# Patient Record
Sex: FEMALE | Race: WHITE | NOT HISPANIC OR LATINO | Employment: OTHER | URBAN - METROPOLITAN AREA
[De-identification: names, ages, dates, MRNs, and addresses within clinical notes are randomized per-mention and may not be internally consistent; named-entity substitution may affect disease eponyms.]

---

## 2018-11-06 ENCOUNTER — OFFICE VISIT (OUTPATIENT)
Dept: INTERNAL MEDICINE CLINIC | Facility: CLINIC | Age: 53
End: 2018-11-06
Payer: COMMERCIAL

## 2018-11-06 VITALS
HEART RATE: 87 BPM | SYSTOLIC BLOOD PRESSURE: 143 MMHG | HEIGHT: 68 IN | BODY MASS INDEX: 35.49 KG/M2 | OXYGEN SATURATION: 97 % | DIASTOLIC BLOOD PRESSURE: 81 MMHG | TEMPERATURE: 98.2 F | WEIGHT: 234.2 LBS

## 2018-11-06 DIAGNOSIS — E66.01 CLASS 2 SEVERE OBESITY DUE TO EXCESS CALORIES WITH SERIOUS COMORBIDITY AND BODY MASS INDEX (BMI) OF 35.0 TO 35.9 IN ADULT (HCC): ICD-10-CM

## 2018-11-06 DIAGNOSIS — Z01.818 PREOPERATIVE CLEARANCE: Primary | ICD-10-CM

## 2018-11-06 DIAGNOSIS — H25.9 AGE-RELATED CATARACT OF BOTH EYES, UNSPECIFIED AGE-RELATED CATARACT TYPE: ICD-10-CM

## 2018-11-06 DIAGNOSIS — E11.65 TYPE 2 DIABETES MELLITUS WITH HYPERGLYCEMIA, WITHOUT LONG-TERM CURRENT USE OF INSULIN (HCC): ICD-10-CM

## 2018-11-06 PROCEDURE — 99242 OFF/OP CONSLTJ NEW/EST SF 20: CPT | Performed by: NURSE PRACTITIONER

## 2018-11-06 RX ORDER — BLOOD SUGAR DIAGNOSTIC
STRIP MISCELLANEOUS
Qty: 180 EACH | Refills: 0 | Status: SHIPPED | OUTPATIENT
Start: 2018-11-06 | End: 2019-10-22

## 2018-11-06 RX ORDER — PNV NO.95/FERROUS FUM/FOLIC AC 28MG-0.8MG
1 TABLET ORAL 2 TIMES DAILY
COMMUNITY
End: 2020-04-10 | Stop reason: ALTCHOICE

## 2018-11-06 RX ORDER — LANOLIN ALCOHOL/MO/W.PET/CERES
1 CREAM (GRAM) TOPICAL DAILY
COMMUNITY
End: 2020-04-10 | Stop reason: ALTCHOICE

## 2018-11-06 RX ORDER — BLOOD SUGAR DIAGNOSTIC
STRIP MISCELLANEOUS
Qty: 180 EACH | Refills: 2 | Status: SHIPPED | OUTPATIENT
Start: 2018-11-06 | End: 2018-11-06 | Stop reason: SDUPTHER

## 2018-11-06 RX ORDER — BLOOD SUGAR DIAGNOSTIC
STRIP MISCELLANEOUS
COMMUNITY
Start: 2018-10-19 | End: 2018-11-06 | Stop reason: SDUPTHER

## 2018-11-06 RX ORDER — KETOROLAC TROMETHAMINE 5 MG/ML
SOLUTION OPHTHALMIC
COMMUNITY
Start: 2018-11-06 | End: 2019-10-22

## 2018-11-06 RX ORDER — MULTIVIT WITH MINERALS/LUTEIN
1000 TABLET ORAL DAILY
COMMUNITY
End: 2019-10-22

## 2018-11-06 RX ORDER — LANCETS 33 GAUGE
EACH MISCELLANEOUS
COMMUNITY
Start: 2018-10-19 | End: 2019-10-22

## 2018-11-06 RX ORDER — ACETAMINOPHEN 160 MG
1 TABLET,DISINTEGRATING ORAL DAILY
COMMUNITY
End: 2020-04-10 | Stop reason: ALTCHOICE

## 2018-11-06 NOTE — PROGRESS NOTES
Assessment/Plan:     Diagnoses and all orders for this visit:    Preoperative clearance    Type 2 diabetes mellitus with hyperglycemia, without long-term current use of insulin (HCC)  -     Discontinue: ONETOUCH VERIO test strip; Twice a day  -     HEMOGLOBIN A1C W/ EAG ESTIMATION; Future  -     Comprehensive metabolic panel; Future  -     CBC and differential; Future  -     Lipid panel; Future  -     ONETOUCH VERIO test strip; Twice a day    Class 2 severe obesity due to excess calories with serious comorbidity and body mass index (BMI) of 35 0 to 35 9 in adult (HCC)  -     HEMOGLOBIN A1C W/ EAG ESTIMATION; Future  -     Comprehensive metabolic panel; Future  -     CBC and differential; Future  -     Lipid panel; Future    Age-related cataract of both eyes, unspecified age-related cataract type    Other orders  -     B Complex Vitamins (B COMPLEX 1 PO); Take 1 capsule by mouth daily  -     Discontinue: ONETOUCH VERIO test strip;   -     Ferrous Sulfate (IRON) 325 (65 Fe) MG TABS; Take 1 tablet by mouth 2 (two) times a day  -     ketorolac (ACULAR) 0 5 % ophthalmic solution;   -     ONETOUCH DELICA LANCETS 73H MISC;   -     cyanocobalamin (VITAMIN B-12) 1,000 mcg tablet; Take 1 tablet by mouth daily  -     Cholecalciferol (VITAMIN D3) 2000 units capsule; Take 1 capsule by mouth daily  -     vitamin E, tocopherol, 1,000 units capsule; Take 1,000 Units by mouth daily          Subjective:      Patient ID: Deanne Vinson is a 47 y o  female  Patient is here for cataract sx clearance with dr Jerry medley        The following portions of the patient's history were reviewed and updated as appropriate: allergies, current medications, past family history, past medical history, past social history, past surgical history and problem list     Review of Systems   Constitutional: Negative  HENT: Negative  Eyes: Negative  Respiratory: Negative  Cardiovascular: Negative  Gastrointestinal: Negative  Musculoskeletal: Negative  Neurological: Negative  No family history on file  Past Medical History:   Diagnosis Date    Non-toxic multinodular goiter      Social History     Social History    Marital status: /Civil Union     Spouse name: N/A    Number of children: N/A    Years of education: N/A     Occupational History    Not on file  Social History Main Topics    Smoking status: Current Some Day Smoker    Smokeless tobacco: Not on file    Alcohol use Yes      Comment: social     Drug use: Unknown    Sexual activity: Not on file     Other Topics Concern    Not on file     Social History Narrative    Exercising regularly                Current Outpatient Prescriptions:     B Complex Vitamins (B COMPLEX 1 PO), Take 1 capsule by mouth daily, Disp: , Rfl:     Cholecalciferol (VITAMIN D3) 2000 units capsule, Take 1 capsule by mouth daily, Disp: , Rfl:     cyanocobalamin (VITAMIN B-12) 1,000 mcg tablet, Take 1 tablet by mouth daily, Disp: , Rfl:     Ferrous Sulfate (IRON) 325 (65 Fe) MG TABS, Take 1 tablet by mouth 2 (two) times a day, Disp: , Rfl:     ketorolac (ACULAR) 0 5 % ophthalmic solution, , Disp: , Rfl:     ONETOUCH DELICA LANCETS 07F MISC, , Disp: , Rfl:     ONETOUCH VERIO test strip, Twice a day, Disp: 180 each, Rfl: 0    vitamin E, tocopherol, 1,000 units capsule, Take 1,000 Units by mouth daily, Disp: , Rfl:   Allergies   Allergen Reactions    Other Throat Swelling         Objective:      /81   Pulse 87   Temp 98 2 °F (36 8 °C)   Ht 5' 8" (1 727 m)   Wt 106 kg (234 lb 3 2 oz)   SpO2 97%   BMI 35 61 kg/m²          Physical Exam   Constitutional: She is oriented to person, place, and time  She appears well-developed and well-nourished  HENT:   Head: Normocephalic and atraumatic  Eyes: Pupils are equal, round, and reactive to light  Conjunctivae are normal    Neck: Normal range of motion  Neck supple  Cardiovascular: Normal rate and regular rhythm  Pulmonary/Chest: Effort normal and breath sounds normal    Abdominal: Soft  Bowel sounds are normal    Musculoskeletal: Normal range of motion  Neurological: She is alert and oriented to person, place, and time  Skin: Skin is warm and dry

## 2018-12-04 LAB
LEFT EYE DIABETIC RETINOPATHY: NORMAL
RIGHT EYE DIABETIC RETINOPATHY: NORMAL
SEVERITY (EYE EXAM): NORMAL

## 2019-06-13 DIAGNOSIS — Z12.39 SCREENING FOR MALIGNANT NEOPLASM OF BREAST: Primary | ICD-10-CM

## 2019-08-21 ENCOUNTER — OFFICE VISIT (OUTPATIENT)
Dept: INTERNAL MEDICINE CLINIC | Facility: CLINIC | Age: 54
End: 2019-08-21
Payer: COMMERCIAL

## 2019-08-21 VITALS
OXYGEN SATURATION: 99 % | RESPIRATION RATE: 16 BRPM | SYSTOLIC BLOOD PRESSURE: 146 MMHG | BODY MASS INDEX: 37.5 KG/M2 | HEART RATE: 86 BPM | WEIGHT: 247.4 LBS | TEMPERATURE: 98.1 F | DIASTOLIC BLOOD PRESSURE: 78 MMHG | HEIGHT: 68 IN

## 2019-08-21 DIAGNOSIS — M54.41 CHRONIC BILATERAL LOW BACK PAIN WITH BILATERAL SCIATICA: ICD-10-CM

## 2019-08-21 DIAGNOSIS — E11.65 TYPE 2 DIABETES MELLITUS WITH HYPERGLYCEMIA, WITHOUT LONG-TERM CURRENT USE OF INSULIN (HCC): Primary | ICD-10-CM

## 2019-08-21 DIAGNOSIS — Z72.89 ALCOHOL USE: ICD-10-CM

## 2019-08-21 DIAGNOSIS — Z00.00 ANNUAL PHYSICAL EXAM: ICD-10-CM

## 2019-08-21 DIAGNOSIS — M54.42 CHRONIC BILATERAL LOW BACK PAIN WITH BILATERAL SCIATICA: ICD-10-CM

## 2019-08-21 DIAGNOSIS — G89.29 CHRONIC BILATERAL LOW BACK PAIN WITH BILATERAL SCIATICA: ICD-10-CM

## 2019-08-21 PROBLEM — F10.10 ALCOHOL ABUSE: Status: ACTIVE | Noted: 2019-08-21

## 2019-08-21 LAB — SL AMB POCT HEMOGLOBIN AIC: 8.5 (ref ?–6.5)

## 2019-08-21 PROCEDURE — 99396 PREV VISIT EST AGE 40-64: CPT | Performed by: NURSE PRACTITIONER

## 2019-08-21 PROCEDURE — 3045F PR MOST RECENT HEMOGLOBIN A1C LEVEL 7.0-9.0%: CPT | Performed by: NURSE PRACTITIONER

## 2019-08-21 PROCEDURE — 99214 OFFICE O/P EST MOD 30 MIN: CPT | Performed by: NURSE PRACTITIONER

## 2019-08-21 PROCEDURE — 83036 HEMOGLOBIN GLYCOSYLATED A1C: CPT | Performed by: NURSE PRACTITIONER

## 2019-08-21 RX ORDER — GABAPENTIN 100 MG/1
100 CAPSULE ORAL
Qty: 30 CAPSULE | Refills: 2 | Status: SHIPPED | OUTPATIENT
Start: 2019-08-21 | End: 2019-10-22

## 2019-08-21 NOTE — PROGRESS NOTES
ADULT ANNUAL PHYSICAL  [facility] - [facility]    NAME: Ryder Jacobson  AGE: 49 y o  SEX: female  : 1965     DATE: 2019     Assessment and Plan:     Problem List Items Addressed This Visit        Endocrine    Type 2 diabetes mellitus with hyperglycemia, without long-term current use of insulin ([facility] [address] ) - Primary     Lab Results   Component Value Date    HGBA1C 8 5 (A) 2019       No results for input(s): POCGLU in the last 72 hours  Blood Sugar Average: Last 72 hrs:       Refused oral meds had diarrhea on metformin didn't want to try anything else    Educated on eating healthy and losing weight  Will try victoza         Relevant Medications    liraglutide (VICTOZA) injection    Other Relevant Orders    POCT hemoglobin A1c (Completed)    HEMOGLOBIN A1C W/ EAG ESTIMATION    Comprehensive metabolic panel    CBC and differential       Nervous and Auditory    Chronic bilateral low back pain with bilateral sciatica     Patient refused pain management due to cost  Continue doing lower back exercises  Start gabapentin at night for sleep         Relevant Medications    gabapentin (NEURONTIN) 100 mg capsule       Other    Alcohol abuse     Patient drink every night   Partly to mask pain and to fall asleep  Advised that this is not healthy  Will try gabapentin hoping that this will decrease her drining           Other Visit Diagnoses     Annual physical exam              Immunizations and preventive care screenings were discussed with patient today  Appropriate education was printed on patient's after visit summary  Counseling:    Return in about 3 months (around 2019)  Chief Complaint:     Chief Complaint   Patient presents with    Follow-up     forms filled for work      History of Present Illness:     Adult Annual Physical   Patient here for a comprehensive physical exam  The patient reports problems - back pain and insomnia      Diet and Physical Activity  · Diet/Nutrition: poor diet  · Exercise: no formal exercise  Depression Screening  PHQ-9 Depression Screening    PHQ-9:    Frequency of the following problems over the past two weeks:            General Health  · Sleep: gets 4-6 hours of sleep on average  · Hearing: normal - bilateral   · Vision: no vision problems  · Dental: regular dental visits  ·    Review of Systems:     Review of Systems   Constitutional: Negative  HENT: Negative  Eyes: Negative  Respiratory: Negative  Cardiovascular: Negative  Gastrointestinal: Negative  Musculoskeletal: Positive for back pain  Neurological: Negative         Past Medical History:     Past Medical History:   Diagnosis Date    Non-toxic multinodular goiter       Past Surgical History:     Past Surgical History:   Procedure Laterality Date    STOMACH SURGERY      for morbid obesity / last assessed 9/10/12    US GUIDED THYROID BIOPSY      biopsy thyroid using percutaneous core needle       Social History:     Social History     Socioeconomic History    Marital status: /Civil Union     Spouse name: None    Number of children: None    Years of education: None    Highest education level: None   Occupational History    None   Social Needs    Financial resource strain: None    Food insecurity:     Worry: None     Inability: None    Transportation needs:     Medical: None     Non-medical: None   Tobacco Use    Smoking status: Former Smoker    Smokeless tobacco: Never Used   Substance and Sexual Activity    Alcohol use: Yes     Frequency: 4 or more times a week     Drinks per session: 3 or 4     Comment: social     Drug use: Never    Sexual activity: None   Lifestyle    Physical activity:     Days per week: None     Minutes per session: None    Stress: None   Relationships    Social connections:     Talks on phone: None     Gets together: None     Attends Restoration service: None     Active member of club or organization: None     Attends meetings of clubs or organizations: None     Relationship status: None    Intimate partner violence:     Fear of current or ex partner: None     Emotionally abused: None     Physically abused: None     Forced sexual activity: None   Other Topics Concern    None   Social History Narrative    Exercising regularly           Family History:     No family history on file  Current Medications:     Current Outpatient Medications   Medication Sig Dispense Refill    B Complex Vitamins (B COMPLEX 1 PO) Take 1 capsule by mouth daily      Cholecalciferol (VITAMIN D3) 2000 units capsule Take 1 capsule by mouth daily      cyanocobalamin (VITAMIN B-12) 1,000 mcg tablet Take 1 tablet by mouth daily      ketorolac (ACULAR) 0 5 % ophthalmic solution       Multiple Vitamins-Minerals (PRESERVISION AREDS PO) Take 1 capsule by mouth      ONETOUCH DELICA LANCETS 51G MISC       ONETOUCH VERIO test strip Twice a day 180 each 0    vitamin E, tocopherol, 1,000 units capsule Take 1,000 Units by mouth daily      Ferrous Sulfate (IRON) 325 (65 Fe) MG TABS Take 1 tablet by mouth 2 (two) times a day      gabapentin (NEURONTIN) 100 mg capsule Take 1 capsule (100 mg total) by mouth daily at bedtime 30 capsule 2    liraglutide (VICTOZA) injection Inject 0 1 mL (0 6 mg total) under the skin daily for 30 days 1 pen 2     No current facility-administered medications for this visit  Allergies: Allergies   Allergen Reactions    Other Throat Swelling      Physical Exam:     /78 (BP Location: Left arm, Patient Position: Sitting, Cuff Size: Large)   Pulse 86   Temp 98 1 °F (36 7 °C) (Oral)   Resp 16   Ht 5' 7 5" (1 715 m)   Wt 112 kg (247 lb 6 4 oz)   SpO2 99%   BMI 38 18 kg/m²     Physical Exam   Constitutional: She is oriented to person, place, and time  She appears well-developed and well-nourished  HENT:   Head: Normocephalic and atraumatic     Right Ear: External ear normal    Left Ear: External ear normal    Nose: Nose normal    Mouth/Throat: Oropharynx is clear and moist    Eyes: Pupils are equal, round, and reactive to light  Conjunctivae are normal    Neck: Normal range of motion  Neck supple  Cardiovascular: Normal rate and regular rhythm  Pulmonary/Chest: Effort normal and breath sounds normal    Abdominal: Soft  Bowel sounds are normal    Musculoskeletal:        Lumbar back: She exhibits decreased range of motion and pain  Neurological: She is alert and oriented to person, place, and time  Skin: Skin is warm and dry  Nursing note and vitals reviewed        IZABELA Cameron  [facility]

## 2019-08-21 NOTE — ASSESSMENT & PLAN NOTE
Patient drink every night   Partly to mask pain and to fall asleep  Advised that this is not healthy  Will try gabapentin hoping that this will decrease her drining

## 2019-08-21 NOTE — ASSESSMENT & PLAN NOTE
Lab Results   Component Value Date    HGBA1C 8 5 (A) 08/21/2019       No results for input(s): POCGLU in the last 72 hours      Blood Sugar Average: Last 72 hrs:       Refused oral meds had diarrhea on metformin didn't want to try anything else    Educated on eating healthy and losing weight  Will try victoza

## 2019-08-21 NOTE — PROGRESS NOTES
Assessment/Plan:    Type 2 diabetes mellitus with hyperglycemia, without long-term current use of insulin ([facility])  Lab Results   Component Value Date    HGBA1C 8 5 (A) 08/21/2019       No results for input(s): POCGLU in the last 72 hours  Blood Sugar Average: Last 72 hrs:       Refused oral meds had diarrhea on metformin didn't want to try anything else    Educated on eating healthy and losing weight  Will try victoza    Chronic bilateral low back pain with bilateral sciatica  Patient refused pain management due to cost  Continue doing lower back exercises  Start gabapentin at night for sleep    Alcohol abuse  Patient drink every night   Partly to mask pain and to fall asleep  Advised that this is not healthy  Will try gabapentin hoping that this will decrease her drining       Diagnoses and all orders for this visit:    Type 2 diabetes mellitus with hyperglycemia, without long-term current use of insulin ([facility])  -     POCT hemoglobin A1c  -     liraglutide (VICTOZA) injection; Inject 0 1 mL (0 6 mg total) under the skin daily for 30 days  -     HEMOGLOBIN A1C W/ EAG ESTIMATION; Future  -     Comprehensive metabolic panel; Future  -     CBC and differential; Future    Chronic bilateral low back pain with bilateral sciatica  -     gabapentin (NEURONTIN) 100 mg capsule; Take 1 capsule (100 mg total) by mouth daily at bedtime    Alcohol abuse    Annual physical exam    Other orders  -     Cancel: TDAP VACCINE GREATER THAN OR EQUAL TO 6YO IM  -     Cancel: Ambulatory referral to Gastroenterology; Future  -     Cancel: Ambulatory referral to Gynecology; Future  -     Multiple Vitamins-Minerals (PRESERVISION AREDS PO); Take 1 capsule by mouth          Subjective:      Patient ID: Erna Goodson is a 49 y o  female  Lower back pain for years with radiation to legs  Sees chiropractor but can no longer afford to see him  Doing at home PT  Works as a CNA      Back Pain   This is a new problem   The current episode started in the past 7 days  The problem occurs constantly  The problem is unchanged  The pain is present in the lumbar spine  The quality of the pain is described as burning and aching  The pain is severe  The symptoms are aggravated by bending and twisting  The following portions of the patient's history were reviewed and updated as appropriate: allergies, current medications, past family history, past medical history, past social history, past surgical history and problem list     Review of Systems   Constitutional: Negative  HENT: Negative  Eyes: Negative  Respiratory: Negative  Cardiovascular: Negative  Gastrointestinal: Negative  Musculoskeletal: Positive for back pain  Neurological: Negative  Objective:      /78 (BP Location: Left arm, Patient Position: Sitting, Cuff Size: Large)   Pulse 86   Temp 98 1 °F (36 7 °C) (Oral)   Resp 16   Ht 5' 7 5" (1 715 m)   Wt 112 kg (247 lb 6 4 oz)   SpO2 99%   BMI 38 18 kg/m²           Physical Exam   Constitutional: She is oriented to person, place, and time  She appears well-developed and well-nourished  HENT:   Head: Normocephalic and atraumatic  Right Ear: External ear normal    Left Ear: External ear normal    Nose: Nose normal    Mouth/Throat: Oropharynx is clear and moist    Eyes: Pupils are equal, round, and reactive to light  Conjunctivae are normal    Neck: Normal range of motion  Neck supple  Cardiovascular: Normal rate and regular rhythm  Pulmonary/Chest: Effort normal and breath sounds normal    Abdominal: Soft  Bowel sounds are normal    Musculoskeletal:        Lumbar back: She exhibits decreased range of motion and pain  Neurological: She is alert and oriented to person, place, and time  Skin: Skin is warm and dry  Nursing note and vitals reviewed

## 2019-08-21 NOTE — PATIENT INSTRUCTIONS

## 2019-08-21 NOTE — ASSESSMENT & PLAN NOTE
Patient refused pain management due to cost  Continue doing lower back exercises  Start gabapentin at night for sleep

## 2019-09-11 ENCOUNTER — TELEPHONE (OUTPATIENT)
Dept: INTERNAL MEDICINE CLINIC | Facility: CLINIC | Age: 54
End: 2019-09-11

## 2019-09-11 DIAGNOSIS — M54.41 CHRONIC BILATERAL LOW BACK PAIN WITH BILATERAL SCIATICA: Primary | ICD-10-CM

## 2019-09-11 DIAGNOSIS — M54.42 CHRONIC BILATERAL LOW BACK PAIN WITH BILATERAL SCIATICA: Primary | ICD-10-CM

## 2019-09-11 DIAGNOSIS — G89.29 CHRONIC BILATERAL LOW BACK PAIN WITH BILATERAL SCIATICA: Primary | ICD-10-CM

## 2019-09-11 RX ORDER — METHYLPREDNISOLONE 4 MG/1
TABLET ORAL
Qty: 21 EACH | Refills: 0 | Status: SHIPPED | OUTPATIENT
Start: 2019-09-11 | End: 2019-10-22

## 2019-09-11 NOTE — TELEPHONE ENCOUNTER
Pt was in to see you a few weeks ago for back pain  She said you gave her Gabapentin 100 MG and it's not helping her pain at all  She wants to know how to proceed  She also said since she cannot work due to her pain and not being able to sit or stand for long periods of time, she might go on disability and wants to know if you would be able to help her with those forms  Please advise

## 2019-09-11 NOTE — TELEPHONE ENCOUNTER
She should see pain management for this and they can help her with disability  For the pain I can send her prednisone if she wants to try that

## 2019-10-22 ENCOUNTER — HOSPITAL ENCOUNTER (OUTPATIENT)
Dept: RADIOLOGY | Facility: HOSPITAL | Age: 54
Discharge: HOME | End: 2019-10-22
Attending: ANESTHESIOLOGY
Payer: COMMERCIAL

## 2019-10-22 ENCOUNTER — CONSULT (OUTPATIENT)
Dept: PAIN MEDICINE | Facility: CLINIC | Age: 54
End: 2019-10-22
Payer: COMMERCIAL

## 2019-10-22 VITALS
DIASTOLIC BLOOD PRESSURE: 87 MMHG | SYSTOLIC BLOOD PRESSURE: 177 MMHG | TEMPERATURE: 98.5 F | BODY MASS INDEX: 38.58 KG/M2 | HEART RATE: 85 BPM | WEIGHT: 250 LBS

## 2019-10-22 DIAGNOSIS — M43.16 SPONDYLOLISTHESIS AT L3-L4 LEVEL: ICD-10-CM

## 2019-10-22 DIAGNOSIS — M46.1 SACROILIITIS (HCC): ICD-10-CM

## 2019-10-22 DIAGNOSIS — M51.16 INTERVERTEBRAL DISC DISORDER WITH RADICULOPATHY OF LUMBAR REGION: Primary | ICD-10-CM

## 2019-10-22 DIAGNOSIS — M25.552 BILATERAL HIP PAIN: ICD-10-CM

## 2019-10-22 DIAGNOSIS — M25.551 BILATERAL HIP PAIN: ICD-10-CM

## 2019-10-22 PROCEDURE — 73522 X-RAY EXAM HIPS BI 3-4 VIEWS: CPT

## 2019-10-22 PROCEDURE — 99244 OFF/OP CNSLTJ NEW/EST MOD 40: CPT | Performed by: ANESTHESIOLOGY

## 2019-10-22 RX ORDER — GABAPENTIN 300 MG/1
CAPSULE ORAL
Qty: 90 CAPSULE | Refills: 0 | Status: SHIPPED | OUTPATIENT
Start: 2019-10-22 | End: 2019-12-11 | Stop reason: SDUPTHER

## 2019-10-22 NOTE — PROGRESS NOTES
Assessment  1  Intervertebral disc disorder with radiculopathy of lumbar region    2  Bilateral hip pain    3  Spondylolisthesis at L3-L4 level    4  Sacroiliitis ([address] )        Plan  The patient's symptoms, history/physical are consistent with pain that is multifactorial in origin but predominantly the result of her anterolisthesis at L3-4 with disc protrusion and foraminal stenosis as well as likely underlying sacroiliitis and hip pathology contributing to the symptoms  At this time, I will order x-rays of the hip and pelvis to evaluate for hip degeneration  I advised we will call with the results and discuss treatment moving forward which will include either performing epidural steroid injection versus corticosteroid injections into the sacroiliac joints versus hips  For now, I will have her increase the gabapentin to 300 mg q h s  with instructions to titrate up to t i d  dosing  I advised her that she has not gained significant weight since she started this medication even though she feels she has gained 10 lb  In addition, patient did admit to drinking a case of beer a week which she is trying to cut back on  [place] Prescription Drug Monitoring Program report was reviewed and was appropriate     My impressions and treatment recommendations were discussed in detail with the patient who verbalized understanding and had no further questions  Discharge instructions were provided  I personally saw and examined the patient and I agree with the above discussed plan of care  Orders Placed This Encounter   Procedures    XR hip/pelv 2-3 vws right if performed     Standing Status:   Future     Standing Expiration Date:   10/22/2023     Scheduling Instructions:      Bring along any outside films relating to this procedure  Order Specific Question:   Is the patient pregnant?      Answer:   No     New Medications Ordered This Visit   Medications    gabapentin (NEURONTIN) 300 mg capsule Sig: Take 1 tablet at bedtime for 3 days, then 1 tablet twice daily for 3 days, then 1 tablet 3 times daily     Dispense:  90 capsule     Refill:  0       History of Present Illness    Tawana Mahmood is a 49 y o  female who presents for consultation in regards to lower back and bilateral lower extremity pain  Symptoms have been present for 4 years without any precipitating injury or trauma  Pain is moderate to severe rated 7/10 on numeric rating scale and felt nearly constantly  Symptoms described to be throbbing, sharp, shooting, cramping and radiates into both legs with numbness  She feels weakness the legs  She uses a cane for ambulation and sometimes a walker  Symptoms are aggravated with lying down, standing, bending, sitting, walking, exercise, relaxation  There is no change with coughing, sneezing or bowel movements  Treatment history has included use of Aleve or Tylenol which provides minimal relief  She has not done any physical therapy  I have personally reviewed and/or updated the patient's past medical history, past surgical history, family history, social history, current medications, allergies, and vital signs today  Review of Systems   Constitutional: Positive for unexpected weight change  Negative for fever  HENT: Negative for trouble swallowing  Eyes: Negative for visual disturbance  Respiratory: Negative for shortness of breath and wheezing  Cardiovascular: Positive for leg swelling  Negative for chest pain and palpitations  Gastrointestinal: Negative for constipation, diarrhea, nausea and vomiting  Endocrine: Negative for cold intolerance, heat intolerance and polydipsia  Genitourinary: Negative for difficulty urinating and frequency  Musculoskeletal: Negative for arthralgias, gait problem, joint swelling and myalgias  Skin: Negative for rash  Neurological: Positive for numbness  Negative for dizziness, seizures, syncope, weakness and headaches  Hematological: Does not bruise/bleed easily  Psychiatric/Behavioral: Negative for dysphoric mood  All other systems reviewed and are negative  Patient Active Problem List   Diagnosis    Type 2 diabetes mellitus with hyperglycemia, without long-term current use of insulin ([facility] [address] )    Spondylolisthesis at L3-L4 level    Alcohol abuse       Past Medical History:   Diagnosis Date    Non-toxic multinodular goiter        Past Surgical History:   Procedure Laterality Date    STOMACH SURGERY      for morbid obesity / last assessed 9/10/12    US GUIDED THYROID BIOPSY      biopsy thyroid using percutaneous core needle        No family history on file      Social History     Occupational History    Not on file   Tobacco Use    Smoking status: Former Smoker    Smokeless tobacco: Never Used   Substance and Sexual Activity    Alcohol use: Yes     Frequency: 4 or more times a week     Drinks per session: 3 or 4     Comment: social     Drug use: Never    Sexual activity: Not on file       Current Outpatient Medications on File Prior to Visit   Medication Sig    B Complex Vitamins (B COMPLEX 1 PO) Take 1 capsule by mouth daily    Cholecalciferol (VITAMIN D3) 2000 units capsule Take 1 capsule by mouth daily    cyanocobalamin (VITAMIN B-12) 1,000 mcg tablet Take 1 tablet by mouth daily    Ferrous Sulfate (IRON) 325 (65 Fe) MG TABS Take 1 tablet by mouth 2 (two) times a day    Multiple Vitamins-Minerals (PRESERVISION AREDS PO) Take 1 capsule by mouth    [DISCONTINUED] gabapentin (NEURONTIN) 100 mg capsule Take 1 capsule (100 mg total) by mouth daily at bedtime    [DISCONTINUED] vitamin E, tocopherol, 1,000 units capsule Take 1,000 Units by mouth daily    [DISCONTINUED] ketorolac (ACULAR) 0 5 % ophthalmic solution     [DISCONTINUED] liraglutide (VICTOZA) injection Inject 0 1 mL (0 6 mg total) under the skin daily for 30 days    [DISCONTINUED] methylPREDNISolone 4 MG tablet therapy pack Use as directed on package    [DISCONTINUED] Lawrence Beckford LANCETS 94L MISC     [DISCONTINUED] ONETOUCH VERIO test strip Twice a day     No current facility-administered medications on file prior to visit  No Known Allergies    Physical Exam    BP (!) 177/87   Pulse 85   Temp 98 5 °F (36 9 °C) (Oral)   Wt 113 kg (250 lb)   BMI 38 58 kg/m²     Constitutional: normal, well developed, well nourished, alert, in no distress and non-toxic and no overt pain behavior  and obese  Eyes: anicteric  HEENT: grossly intact  Neck: supple, symmetric, trachea midline and no masses   Pulmonary:even and unlabored  Cardiovascular:No edema or pitting edema present  Skin:Normal without rashes or lesions and well hydrated  Psychiatric:Mood and affect appropriate  Neurologic:Cranial Nerves II-XII grossly intact  Musculoskeletal:normal     Lumbar Spine Exam  Appearance:  Normal lordosis  Palpation/Tenderness:  left lumbar paraspinal tenderness  right lumbar paraspinal tenderness  left sacroiliac joint tenderness  right sacroiliac joint tenderness  left piriformis tenderness  right piriformis tenderness  Sensory:  no sensory deficits noted  Range of Motion:  Flexion:   Moderately limited  with pain  Extension:  Moderately limited  with pain  Lateral Flexion - Left:  Moderately limited  with pain  Lateral Flexion - Right:  Moderately limited  with pain  Rotation - Left:  Moderately limited  with pain  Rotation - Right:  Moderately limited  with pain  Motor Strength:  Left hip flexion:  5/5  Left hip extension:  5/5  Right hip flexion:  5/5  Right hip extension:  5/5  Left knee flexion:  5/5  Left knee extension:  5/5  Right knee flexion:  5/5  Right knee extension:  5/5  Left foot dorsiflexion:  5/5  Left foot plantar flexion:  5/5  Right foot dorsiflexion:  5/5  Right foot plantar flexion:  5/5  Reflexes:  Left Patellar:  1+   Right Patellar:  1+   Left Achilles:  1+   Right Achilles:  1+   Special Tests:  Left Straight Leg Test:  positive  Right Straight Leg Test:  positive  Left Fam's Maneuver:  positive  Right Fam's Maneuver:  positive      Imaging    MRI Lumbar Spine (4/26/2019)    1  Disc degeneration with diffuse disc protrusion centric to left L3-4 compromising left neuroforamen with 3 mm anterolisthesis of L3 on L4    2    Broad-based disc protrusion to left compromising left neural foraminal L4-5

## 2019-10-28 ENCOUNTER — TELEPHONE (OUTPATIENT)
Dept: PAIN MEDICINE | Facility: CLINIC | Age: 54
End: 2019-10-28

## 2019-10-28 DIAGNOSIS — M46.1 SACROILIITIS (HCC): Primary | ICD-10-CM

## 2019-10-28 NOTE — TELEPHONE ENCOUNTER
xr hips reviewed showing mild arthritis in both hips  Would recommend proceeding with bilateral SIJ injections to decrease swelling and inflammation to help reduce pain  If amenable, I will place order

## 2019-10-29 ENCOUNTER — TELEPHONE (OUTPATIENT)
Dept: RADIOLOGY | Facility: CLINIC | Age: 54
End: 2019-10-29

## 2019-10-29 DIAGNOSIS — F41.9 ANXIETY: Primary | ICD-10-CM

## 2019-10-29 RX ORDER — ALPRAZOLAM 0.5 MG/1
TABLET ORAL
Qty: 2 TABLET | Refills: 0 | Status: SHIPPED | OUTPATIENT
Start: 2019-10-29 | End: 2020-04-10 | Stop reason: ALTCHOICE

## 2019-10-29 NOTE — TELEPHONE ENCOUNTER
Called patient to schedule her injection and she stated that Case Stein mentioned he would give her Xanax for her nerves  She just wanted to make sure he remembered   Thank you

## 2019-11-19 ENCOUNTER — HOSPITAL ENCOUNTER (OUTPATIENT)
Dept: RADIOLOGY | Facility: CLINIC | Age: 54
Discharge: HOME | End: 2019-11-19
Attending: ANESTHESIOLOGY
Payer: COMMERCIAL

## 2019-11-19 VITALS
RESPIRATION RATE: 20 BRPM | SYSTOLIC BLOOD PRESSURE: 132 MMHG | OXYGEN SATURATION: 94 % | HEART RATE: 78 BPM | DIASTOLIC BLOOD PRESSURE: 82 MMHG | TEMPERATURE: 98 F

## 2019-11-19 DIAGNOSIS — M46.1 SACROILIITIS (HCC): ICD-10-CM

## 2019-11-19 PROCEDURE — 27096 INJECT SACROILIAC JOINT: CPT | Performed by: ANESTHESIOLOGY

## 2019-11-19 RX ORDER — 0.9 % SODIUM CHLORIDE 0.9 %
10 VIAL (ML) INJECTION ONCE
Status: COMPLETED | OUTPATIENT
Start: 2019-11-19 | End: 2019-11-19

## 2019-11-19 RX ORDER — BUPIVACAINE HCL/PF 2.5 MG/ML
10 VIAL (ML) INJECTION ONCE
Status: COMPLETED | OUTPATIENT
Start: 2019-11-19 | End: 2019-11-19

## 2019-11-19 RX ORDER — METHYLPREDNISOLONE ACETATE 80 MG/ML
80 INJECTION, SUSPENSION INTRA-ARTICULAR; INTRALESIONAL; INTRAMUSCULAR; PARENTERAL; SOFT TISSUE ONCE
Status: COMPLETED | OUTPATIENT
Start: 2019-11-19 | End: 2019-11-19

## 2019-11-19 RX ADMIN — METHYLPREDNISOLONE ACETATE 80 MG: 80 INJECTION, SUSPENSION INTRA-ARTICULAR; INTRALESIONAL; INTRAMUSCULAR; PARENTERAL; SOFT TISSUE at 13:36

## 2019-11-19 RX ADMIN — BUPIVACAINE HYDROCHLORIDE 7 ML: 2.5 INJECTION, SOLUTION EPIDURAL; INFILTRATION; INTRACAUDAL at 13:36

## 2019-11-19 RX ADMIN — Medication 4 ML: at 13:34

## 2019-11-19 RX ADMIN — IOHEXOL 1 ML: 300 INJECTION, SOLUTION INTRAVENOUS at 13:35

## 2019-11-19 RX ADMIN — SODIUM CHLORIDE 4 ML: 9 INJECTION, SOLUTION INTRAMUSCULAR; INTRAVENOUS; SUBCUTANEOUS at 13:34

## 2019-11-19 NOTE — DISCHARGE INSTR - LAB
Steroid Joint Injection   WHAT YOU NEED TO KNOW:   A steroid joint injection is a procedure to inject steroid medicine into a joint  Steroid medicine decreases pain and inflammation  The injection may also contain an anesthetic (numbing medicine) to decrease pain  It may be done to treat conditions such as arthritis, gout, or carpal tunnel syndrome  The injections may be given in your knee, ankle, shoulder, elbow, wrist, ankle or sacroiliac joint  1  Do not apply heat to any area that is numb  If you have discomfort or soreness at the injection site, you may apply ice today, 20 minutes on and 20 minutes off  Tomorrow you may use ice or warm, moist heat  Do not apply ice or heat directly to the skin  2  You may have an increase or change in the discomfort for 36-48 hours after your treatment  Apply ice and continue with any pain medicine you have been prescribed  3  Do not do anything strenuous today  You may shower, but no tub baths or hot tubs today  You may resume your normal activities tomorrow, but do not overdo it  Resume normal activities slowly when you are feeling better  4  If you experience redness, drainage or swelling at the injection site, or if you develop a fever above 100 degrees, please call [facility] at [phone] or go to the Emergency Room  5  Continue to take all routine medicines prescribed by your primary care physician unless otherwise instructed by our staff  Most blood thinners should be started again according to your regularly scheduled dosing  If you have any questions, please give our office a call  If you have a problem specifically related to your procedure, please call our office at [phone]  Problems not related to your procedure should be directed to your primary care physician

## 2019-11-19 NOTE — H&P
History of Present Illness: The patient is a 49 y o  female who presents with complaints of lower back pain secondary to sacroiliitis and is here today for bilateral sacroiliac joint injections  Patient Active Problem List   Diagnosis    Type 2 diabetes mellitus with hyperglycemia, without long-term current use of insulin ([facility] [address] )    Spondylolisthesis at L3-L4 level    Alcohol abuse       Past Medical History:   Diagnosis Date    Non-toxic multinodular goiter        Past Surgical History:   Procedure Laterality Date    STOMACH SURGERY      for morbid obesity / last assessed 9/10/12    US GUIDED THYROID BIOPSY      biopsy thyroid using percutaneous core needle          Current Outpatient Medications:     ALPRAZolam (XANAX) 0 5 mg tablet, Take 1 tablet 2 hours prior to procedure    May repeat 30 min prior to procedure, Disp: 2 tablet, Rfl: 0    B Complex Vitamins (B COMPLEX 1 PO), Take 1 capsule by mouth daily, Disp: , Rfl:     Cholecalciferol (VITAMIN D3) 2000 units capsule, Take 1 capsule by mouth daily, Disp: , Rfl:     cyanocobalamin (VITAMIN B-12) 1,000 mcg tablet, Take 1 tablet by mouth daily, Disp: , Rfl:     Ferrous Sulfate (IRON) 325 (65 Fe) MG TABS, Take 1 tablet by mouth 2 (two) times a day, Disp: , Rfl:     gabapentin (NEURONTIN) 300 mg capsule, Take 1 tablet at bedtime for 3 days, then 1 tablet twice daily for 3 days, then 1 tablet 3 times daily, Disp: 90 capsule, Rfl: 0    Multiple Vitamins-Minerals (PRESERVISION AREDS PO), Take 1 capsule by mouth, Disp: , Rfl:     Current Facility-Administered Medications:     bupivacaine (PF) (MARCAINE) 0 25 % injection 10 mL, 10 mL, Intra-articular, Once, Anastacia Cogan, MD    iohexol (OMNIPAQUE) 300 mg/mL injection 50 mL, 50 mL, Intra-articular, Once, Anastacia Cogan, MD    lidocaine (PF) (XYLOCAINE-MPF) 2 % injection 5 mL, 5 mL, Infiltration, Once, Anastacia Cogan, MD    methylPREDNISolone acetate (DEPO-MEDROL) injection 80 mg, 80 mg, Intra-articular, Once, Gloriann Severe, MD    sodium chloride (PF) 0 9 % injection 10 mL, 10 mL, Infiltration, Once, Gloriann Severe, MD    No Known Allergies    Physical Exam:   Vitals:    11/19/19 1316   BP: 144/81   Pulse: 80   Resp: 20   Temp: 98 °F (36 7 °C)   SpO2: 96%     General: Awake, Alert, Oriented x 3, Mood and affect appropriate  Respiratory: Respirations even and unlabored  Cardiovascular: Peripheral pulses intact; no edema  Musculoskeletal Exam:   Lower back tenderness    ASA Score: 2    Patient/Chart Verification  Patient ID Verified: Verbal  Consents Confirmed: To be obtained in the Pre-Procedure area  H&P( within 30 days) Verified: To be obtained in the Pre-Procedure area  Allergies Reviewed: Yes  Anticoag/NSAID held?: NA  Currently on antibiotics?: No    Assessment:   1   Sacroiliitis ([facility] [address] )        Plan: Bilateral SIJ Injections

## 2019-11-26 ENCOUNTER — TELEPHONE (OUTPATIENT)
Dept: PAIN MEDICINE | Facility: CLINIC | Age: 54
End: 2019-11-26

## 2019-11-26 DIAGNOSIS — M51.16 INTERVERTEBRAL DISC DISORDER WITH RADICULOPATHY OF LUMBAR REGION: ICD-10-CM

## 2019-11-26 NOTE — TELEPHONE ENCOUNTER
Pt reports 40% improvement post inj   Pain level 6/10   Patient said since inj her leg cramps have came back

## 2019-12-05 NOTE — TELEPHONE ENCOUNTER
Patient called & left message on S/P voicemail returning Tor[address] call   Please call back at # [phone]

## 2019-12-06 DIAGNOSIS — M47.816 LUMBAR SPONDYLOSIS: Primary | ICD-10-CM

## 2019-12-06 NOTE — TELEPHONE ENCOUNTER
Would recommend bilateral L3-5 MBB's to diagnose facet mediated pain in anticipation of rfa   If amenable, I will send rx

## 2019-12-06 NOTE — TELEPHONE ENCOUNTER
Pt states that she got some relief and that she can sleep a little better but that she still has a burning pain in her back  Pt states that gabapentin does nothing to help with the back pain  States that it is helping with pins and needles feeling in her feet and that she really does not want to take more then gabapentin 300mg 3 times daily as it does dragan make you loopy  Pt would like to know what to do next?       Please advise

## 2019-12-06 NOTE — TELEPHONE ENCOUNTER
S/W pt and explained  MBB and RFA procedures  Pt is interested in moving forward with these procedures  Pt told that  will order inj and have his  call her to set up date for MBB

## 2019-12-11 RX ORDER — GABAPENTIN 300 MG/1
300 CAPSULE ORAL 3 TIMES DAILY
Qty: 90 CAPSULE | Refills: 5 | Status: SHIPPED | OUTPATIENT
Start: 2019-12-11 | End: 2020-06-15 | Stop reason: SDUPTHER

## 2019-12-11 NOTE — TELEPHONE ENCOUNTER
I s/w pt and asked how the gabapentin 300 mg TID was working  Pt said it has helped with the foot neuropathy but not her back or hip pain  Pt denies s/e with it  Told pt I will forward update to FQ about how it is working, not sure if he will make adjustment since it's not helping her back/hips  Pt still has some of the 100 mg pills left she will take use them until refill can be sent  Pt advised in the future 48 hrs needed for RF requests  Uses [facility]

## 2019-12-11 NOTE — TELEPHONE ENCOUNTER
E-rx sent for the 300mg TID dosing    She will be undergoing MBB and hopefully RFA which will help the back/hip pain

## 2019-12-11 NOTE — TELEPHONE ENCOUNTER
Pt contacted Call Center requested refill of their medication  Medication Name:  Gabapentin    Dosage of Med:  300 mg    Frequency of Med:  3 x a day    Remaining Medication:  none    Pharmacy and Location:  [facility]      Pt  Preferred Callback Phone Number:  [phone]    Thank you

## 2019-12-26 ENCOUNTER — HOSPITAL ENCOUNTER (OUTPATIENT)
Dept: RADIOLOGY | Facility: CLINIC | Age: 54
Discharge: HOME | End: 2019-12-26
Attending: ANESTHESIOLOGY
Payer: COMMERCIAL

## 2019-12-26 ENCOUNTER — TELEPHONE (OUTPATIENT)
Dept: INTERNAL MEDICINE CLINIC | Facility: CLINIC | Age: 54
End: 2019-12-26

## 2019-12-26 VITALS
SYSTOLIC BLOOD PRESSURE: 163 MMHG | DIASTOLIC BLOOD PRESSURE: 97 MMHG | TEMPERATURE: 97.9 F | HEART RATE: 88 BPM | OXYGEN SATURATION: 98 % | RESPIRATION RATE: 22 BRPM

## 2019-12-26 DIAGNOSIS — M47.816 LUMBAR SPONDYLOSIS: ICD-10-CM

## 2019-12-26 DIAGNOSIS — E11.9 TYPE 2 DIABETES MELLITUS WITHOUT COMPLICATION, WITHOUT LONG-TERM CURRENT USE OF INSULIN (HCC): Primary | ICD-10-CM

## 2019-12-26 PROCEDURE — 64494 INJ PARAVERT F JNT L/S 2 LEV: CPT | Performed by: ANESTHESIOLOGY

## 2019-12-26 PROCEDURE — 64493 INJ PARAVERT F JNT L/S 1 LEV: CPT | Performed by: ANESTHESIOLOGY

## 2019-12-26 RX ORDER — BUPIVACAINE HCL/PF 2.5 MG/ML
10 VIAL (ML) INJECTION ONCE
Status: COMPLETED | OUTPATIENT
Start: 2019-12-26 | End: 2019-12-26

## 2019-12-26 RX ADMIN — BUPIVACAINE HYDROCHLORIDE 3 ML: 2.5 INJECTION, SOLUTION EPIDURAL; INFILTRATION; INTRACAUDAL at 14:06

## 2019-12-26 NOTE — H&P
History of Present Illness: The patient is a 49 y o  female who presents with complaints of lower back pain 2nd lumbar spondylosis and is here today for bilateral L3-5 medial branch blocks  Patient Active Problem List   Diagnosis    Type 2 diabetes mellitus with hyperglycemia, without long-term current use of insulin ([facility] [address] )    Spondylolisthesis at L3-L4 level    Alcohol abuse    Sacroiliitis ([facility] [address] )       Past Medical History:   Diagnosis Date    Non-toxic multinodular goiter        Past Surgical History:   Procedure Laterality Date    STOMACH SURGERY      for morbid obesity / last assessed 9/10/12    US GUIDED THYROID BIOPSY      biopsy thyroid using percutaneous core needle          Current Outpatient Medications:     ALPRAZolam (XANAX) 0 5 mg tablet, Take 1 tablet 2 hours prior to procedure  May repeat 30 min prior to procedure, Disp: 2 tablet, Rfl: 0    B Complex Vitamins (B COMPLEX 1 PO), Take 1 capsule by mouth daily, Disp: , Rfl:     Cholecalciferol (VITAMIN D3) 2000 units capsule, Take 1 capsule by mouth daily, Disp: , Rfl:     cyanocobalamin (VITAMIN B-12) 1,000 mcg tablet, Take 1 tablet by mouth daily, Disp: , Rfl:     Ferrous Sulfate (IRON) 325 (65 Fe) MG TABS, Take 1 tablet by mouth 2 (two) times a day, Disp: , Rfl:     gabapentin (NEURONTIN) 300 mg capsule, Take 1 capsule (300 mg total) by mouth 3 (three) times a day ], Disp: 90 capsule, Rfl: 5    Multiple Vitamins-Minerals (PRESERVISION AREDS PO), Take 1 capsule by mouth, Disp: , Rfl:   No current facility-administered medications for this encounter       No Known Allergies    Physical Exam:   Vitals:    12/26/19 1345   BP: 161/90   Pulse: 94   Resp: 22   Temp: 97 9 °F (36 6 °C)   SpO2: 97%     General: Awake, Alert, Oriented x 3, Mood and affect appropriate  Respiratory: Respirations even and unlabored  Cardiovascular: Peripheral pulses intact; no edema  Musculoskeletal Exam:   Lower back tenderness    ASA Score: 2    Patient/Chart Verification  Patient ID Verified: Verbal  ID Band Applied: No  Consents Confirmed: Procedural, To be obtained in the Pre-Procedure area  H&P( within 30 days) Verified: To be obtained in the Pre-Procedure area  Allergies Reviewed: Yes  Anticoag/NSAID held?: No  Currently on antibiotics?: No    Assessment:   1   Lumbar spondylosis        Plan: Bilateral L3-5 MBB

## 2019-12-26 NOTE — DISCHARGE INSTR - LAB

## 2019-12-26 NOTE — TELEPHONE ENCOUNTER
The patient is seeing a  and wanted you to know that They may be faxing us forms for you to fill out  Thank you

## 2020-01-02 NOTE — TELEPHONE ENCOUNTER
If the disability is for her back she needs to have dr Opal Klein fill them out since theyre treating her back pain  I am not treating her back pain

## 2020-01-02 NOTE — TELEPHONE ENCOUNTER
The patient needs both you and Dr Roberta Graham to agree that the patient should be on disability  She would like you to review both of her [place] spine and pain procedures and verify that she does need to be on disability  Please advise   Thank you

## 2020-01-02 NOTE — TELEPHONE ENCOUNTER
The patient is on diabetic medication  She said it causes her to have diarrhea  The patient has stopped taking it  She also was given a new diabetic medication that costs $1,000  Her sugars range from 150-175  Can something else be prescribe? Please advise  Thank you

## 2020-01-06 NOTE — TELEPHONE ENCOUNTER
She canceled her appointments for disability form fill   She needs to see dr Chava Barajas for that and he can go over what diabetic medications she should be on that's also affordable

## 2020-01-07 ENCOUNTER — TELEPHONE (OUTPATIENT)
Dept: RADIOLOGY | Facility: CLINIC | Age: 55
End: 2020-01-07

## 2020-01-07 NOTE — TELEPHONE ENCOUNTER
Pain diary reviewed - patient not candidate for RFA    Please have her scheduled with Evaline Mantle in the office for re-eval

## 2020-01-07 NOTE — TELEPHONE ENCOUNTER
sw patient, there are two separate issues in this message  1st: patient wanted to know what she can take for her diabetic issuse, because the medication Rebecca sent it would be to expensive  Patient said her sugars are hovering in the area of 175/201  What can she do or take to help this? Patient has been out of work since Sept 9th and she can't afford it  Please advise    2nd: the disability forms have not been received yet so patient will call back once she get forms to make appointment with M05   So this is currently on hold til we hear back from carolin

## 2020-01-08 RX ORDER — METFORMIN HYDROCHLORIDE 500 MG/1
TABLET, EXTENDED RELEASE ORAL
Qty: 60 TABLET | Refills: 2 | Status: SHIPPED | OUTPATIENT
Start: 2020-01-08 | End: 2020-06-15 | Stop reason: SDUPTHER

## 2020-01-09 NOTE — TELEPHONE ENCOUNTER
I spoke with the pt she picked up the Metformin last evening  She stopped drinking alcohol and stopped her coffee  She will call back to schedule appt for her form when she is financially able

## 2020-02-13 ENCOUNTER — OFFICE VISIT (OUTPATIENT)
Dept: PAIN MEDICINE | Facility: CLINIC | Age: 55
End: 2020-02-13
Payer: COMMERCIAL

## 2020-02-13 VITALS
WEIGHT: 250 LBS | TEMPERATURE: 98.6 F | DIASTOLIC BLOOD PRESSURE: 90 MMHG | BODY MASS INDEX: 38.58 KG/M2 | HEART RATE: 84 BPM | SYSTOLIC BLOOD PRESSURE: 162 MMHG

## 2020-02-13 DIAGNOSIS — G89.29 CHRONIC BILATERAL LOW BACK PAIN, UNSPECIFIED WHETHER SCIATICA PRESENT: ICD-10-CM

## 2020-02-13 DIAGNOSIS — M43.16 SPONDYLOLISTHESIS AT L3-L4 LEVEL: ICD-10-CM

## 2020-02-13 DIAGNOSIS — M51.16 INTERVERTEBRAL DISC DISORDER WITH RADICULOPATHY OF LUMBAR REGION: ICD-10-CM

## 2020-02-13 DIAGNOSIS — E11.9 TYPE 2 DIABETES MELLITUS WITHOUT COMPLICATION, WITHOUT LONG-TERM CURRENT USE OF INSULIN (HCC): Primary | ICD-10-CM

## 2020-02-13 DIAGNOSIS — M54.50 CHRONIC BILATERAL LOW BACK PAIN, UNSPECIFIED WHETHER SCIATICA PRESENT: ICD-10-CM

## 2020-02-13 DIAGNOSIS — G89.4 CHRONIC PAIN SYNDROME: ICD-10-CM

## 2020-02-13 DIAGNOSIS — M79.18 MYOFASCIAL PAIN SYNDROME: Primary | ICD-10-CM

## 2020-02-13 DIAGNOSIS — M47.816 LUMBAR SPONDYLOSIS: ICD-10-CM

## 2020-02-13 PROCEDURE — 1036F TOBACCO NON-USER: CPT | Performed by: NURSE PRACTITIONER

## 2020-02-13 PROCEDURE — 99214 OFFICE O/P EST MOD 30 MIN: CPT | Performed by: NURSE PRACTITIONER

## 2020-02-13 RX ORDER — DICLOFENAC SODIUM 75 MG/1
75 TABLET, DELAYED RELEASE ORAL 2 TIMES DAILY
Qty: 60 TABLET | Refills: 0 | Status: SHIPPED | OUTPATIENT
Start: 2020-02-13 | End: 2020-03-06 | Stop reason: SDUPTHER

## 2020-02-13 RX ORDER — TIZANIDINE HYDROCHLORIDE 4 MG/1
CAPSULE, GELATIN COATED ORAL
Qty: 60 CAPSULE | Refills: 0 | Status: SHIPPED | OUTPATIENT
Start: 2020-02-13 | End: 2020-03-30 | Stop reason: SDUPTHER

## 2020-02-13 NOTE — PROGRESS NOTES
Assessment:  1  Chronic pain syndrome    2  Chronic bilateral low back pain, unspecified whether sciatica present    3  Spondylolisthesis at L3-L4 level    4  Lumbar spondylosis        Plan:  Rahul Perea is a 48 y o  female who presents for a follow up office visit in regards to Back Pain and Knee Pain  Patient has a history of chronic pain secondary to low back pain, lumbar spondylolisthesis, lumbar spondylosis, lumbar radiculopathy  Patient presents today with ongoing low back pain  She had underwent bilateral sacroiliac joint injections as well as a bilateral L3 through L5 medial branch block injection  Neither had been helpful  Upon examination, her pain is consistent with a disc protrusion at L4-L5  We discussed epidural steroid injection to decrease pain and nerve irritation  However the patient's hemoglobin A1c from August 2019 was 8 5  I will reach out to her primary care physician to see if he would be amenable to meet rechecking this as it has been 6 months  To help with the pain I will start her on diclofenac 75 mg which she can take twice a day as needed  She was instructed to take the medication with food to prevent GI irritation  I will also start her on tizanidine 4 mg, as she has muscle spasms in the lumbar paraspinal musculature  She was instructed to take 1 tablet at bedtime as the medication may cause drowsiness and dizziness  She will continue on gabapentin as needed  I would avoid opioids with this patient due to history of alcohol abuse  My impressions and treatment recommendations were discussed in detail with the patient who verbalized understanding and had no further questions  Discharge instructions were provided  I personally saw and examined the patient and I agree with the above discussed plan of care  No orders of the defined types were placed in this encounter  No orders of the defined types were placed in this encounter        History of Present Illness:  Lisandro Castro is a 48 y o  female who presents for a follow up office visit in regards to Back Pain and Knee Pain  Patient has a history of chronic pain secondary to low back pain, lumbar spondylolisthesis, lumbar spondylosis, lumbar radiculopathy  The pain radiates across the low back into the hips and groin and right knee  It is constant and described as burning, sharp, throbbing, stiffness, shooting, and numbness  She also feels cramping in her legs  She feels the pain is worse at night and can't sleep  She also has difficulty walking up and down stairs due to the right knee pain  She is currently rating her pain a 9/10 on the numeric rating scale  She had underwent a bilateral sacroiliac joint injection on November 19, 2019 which provided minimal pain relief  She also had undergone a bilateral L3-L5 medial branch block injection December 26, 2019 which she felt increased her pain  She is taking gabapentin 300 mg 3 times a day which provides little pain relief  She denies side effects or bowel or bladder issues    I have personally reviewed and/or updated the patient's past medical history, past surgical history, family history, social history, current medications, allergies, and vital signs today  Review of Systems   Respiratory: Negative for shortness of breath  Cardiovascular: Negative for chest pain  Gastrointestinal: Negative for constipation, diarrhea, nausea and vomiting  Musculoskeletal: Positive for gait problem and joint swelling  Negative for arthralgias and myalgias  Skin: Negative for rash  Neurological: Negative for dizziness, seizures and weakness  All other systems reviewed and are negative        Patient Active Problem List   Diagnosis    Type 2 diabetes mellitus with hyperglycemia, without long-term current use of insulin ([facility] [address] )    Spondylolisthesis at L3-L4 level    Alcohol abuse    Sacroiliitis (HCC)    Lumbar spondylosis       Past Medical History:   Diagnosis Date    Non-toxic multinodular goiter        Past Surgical History:   Procedure Laterality Date    STOMACH SURGERY      for morbid obesity / last assessed 9/10/12    US GUIDED THYROID BIOPSY      biopsy thyroid using percutaneous core needle        No family history on file  Social History     Occupational History    Not on file   Tobacco Use    Smoking status: Former Smoker    Smokeless tobacco: Never Used   Substance and Sexual Activity    Alcohol use: Yes     Frequency: 4 or more times a week     Drinks per session: 3 or 4     Comment: social     Drug use: Never    Sexual activity: Not on file       Current Outpatient Medications on File Prior to Visit   Medication Sig    ALPRAZolam (XANAX) 0 5 mg tablet Take 1 tablet 2 hours prior to procedure  May repeat 30 min prior to procedure    B Complex Vitamins (B COMPLEX 1 PO) Take 1 capsule by mouth daily    Cholecalciferol (VITAMIN D3) 2000 units capsule Take 1 capsule by mouth daily    cyanocobalamin (VITAMIN B-12) 1,000 mcg tablet Take 1 tablet by mouth daily    Ferrous Sulfate (IRON) 325 (65 Fe) MG TABS Take 1 tablet by mouth 2 (two) times a day    gabapentin (NEURONTIN) 300 mg capsule Take 1 capsule (300 mg total) by mouth 3 (three) times a day ]    ketorolac (ACULAR) 0 5 % ophthalmic solution instill 1 drop by ophthalmic route 4 times every day into left eye starting the day of surgery when you get home for 28 days    metFORMIN (GLUCOPHAGE-XR) 500 mg 24 hr tablet One tab daily and increase to two tabs daily if tolerated    Multiple Vitamins-Minerals (PRESERVISION AREDS PO) Take 1 capsule by mouth     No current facility-administered medications on file prior to visit          No Known Allergies    Physical Exam:    /90   Pulse 84   Temp 98 6 °F (37 °C) (Oral)   Wt 113 kg (250 lb)   BMI 38 58 kg/m²     Constitutional:normal, well developed, well nourished, alert, in no distress and non-toxic and no overt pain behavior    Eyes:anicteric  HEENT:grossly intact  Neck:supple, symmetric, trachea midline and no masses   Pulmonary:even and unlabored  Cardiovascular:No edema or pitting edema present  Skin:Normal without rashes or lesions and well hydrated  Psychiatric:Mood and affect appropriate  Neurologic:Cranial Nerves II-XII grossly intact  Musculoskeletal:normal     Lumbar Spine Exam    Appearance:  Normal lordosis  Palpation/Tenderness:  left lumbar paraspinal tenderness  right lumbar paraspinal tenderness   Muscle spasms lumbar paraspinal musculature   Bilateral trochanteric bursa tenderness  Range of Motion:  Flexion:  Minimally limited  with pain  Extension:  Moderately limited  with pain  Motor Strength:  Left hip flexion:  5/5  Right hip flexion:  5/5  Left knee flexion:  5/5  Left knee extension:  5/5  Right knee flexion:  5/5  Right knee extension:  5/5  Left foot dorsiflexion:  5/5  Left foot plantar flexion:  5/5  Right foot dorsiflexion:  5/5  Right foot plantar flexion:  5/5    Special Tests:  Left Straight Leg Test:  positive  Right Straight Leg Test:  positive  Left Fam's Maneuver:  negative  Right Fam's Maneuver:  negative  Left Gaenslen's Test:  negative  Right Gaenslen's Test:  negative       Imaging

## 2020-02-14 ENCOUNTER — TELEPHONE (OUTPATIENT)
Dept: INTERNAL MEDICINE CLINIC | Facility: CLINIC | Age: 55
End: 2020-02-14

## 2020-02-14 NOTE — TELEPHONE ENCOUNTER
----- Message from Rolando Morton DO sent at 2/13/2020  6:14 PM EST -----   Done , notify pt  ----- Message -----  From: IZABELA Rey  Sent: 2/13/2020  10:22 AM EST  To: Rolando Morton DO    Hi,    I saw this patient today, and we discussed an epidural steroid injection  However, her last hemoglobin A1c is 8 5 which was drawn in August 2019  We will only do the injection if it is under 8  Would you mind if I order her another hemoglobin A1c to check at this time?     Thanks    Anai Burrows

## 2020-02-17 ENCOUNTER — TELEPHONE (OUTPATIENT)
Dept: INTERNAL MEDICINE CLINIC | Facility: CLINIC | Age: 55
End: 2020-02-17

## 2020-02-17 ENCOUNTER — TELEPHONE (OUTPATIENT)
Dept: PAIN MEDICINE | Facility: CLINIC | Age: 55
End: 2020-02-17

## 2020-02-17 DIAGNOSIS — E11.65 TYPE 2 DIABETES MELLITUS WITH HYPERGLYCEMIA, WITHOUT LONG-TERM CURRENT USE OF INSULIN (HCC): ICD-10-CM

## 2020-02-17 NOTE — TELEPHONE ENCOUNTER
I s/w pt and made her aware of the same as stated in NM's task  I asked pt to pls contact our office once she has the HGB A1C drawn so NM can check the results  If under 8 then we can move forward with the procedure  FYI: per NM order already in [facility] [address]

## 2020-02-17 NOTE — TELEPHONE ENCOUNTER
Patient has an appointment with Dr Rosalie Newman on 2/28/20 at 8 am   Patient is requesting that we send over any information that will pertain to her appointment with endocrinology      Please fax to: [phone]

## 2020-02-17 NOTE — TELEPHONE ENCOUNTER
Please let patient know that I did not hear back from Dr Antoine Billy, but see he put an order in for HGB A1C to have drawn  Can she call us when she has this drawn, so I can check the results? If under 8 we can move forward with procedure   (order already in [facility])

## 2020-02-19 RX ORDER — BLOOD SUGAR DIAGNOSTIC
STRIP MISCELLANEOUS
Qty: 180 EACH | Refills: 0 | Status: SHIPPED | OUTPATIENT
Start: 2020-02-19 | End: 2020-07-09

## 2020-02-22 ENCOUNTER — APPOINTMENT (OUTPATIENT)
Dept: LAB | Age: 55
End: 2020-02-22
Payer: COMMERCIAL

## 2020-02-22 DIAGNOSIS — E11.9 TYPE 2 DIABETES MELLITUS WITHOUT COMPLICATION, WITHOUT LONG-TERM CURRENT USE OF INSULIN (HCC): ICD-10-CM

## 2020-02-22 LAB
EST. AVERAGE GLUCOSE BLD GHB EST-MCNC: 235 MG/DL
HBA1C MFR BLD: 9.8 %

## 2020-02-22 PROCEDURE — 36415 COLL VENOUS BLD VENIPUNCTURE: CPT

## 2020-02-22 PROCEDURE — 83036 HEMOGLOBIN GLYCOSYLATED A1C: CPT

## 2020-02-24 ENCOUNTER — TELEPHONE (OUTPATIENT)
Dept: PAIN MEDICINE | Facility: CLINIC | Age: 55
End: 2020-02-24

## 2020-02-24 NOTE — TELEPHONE ENCOUNTER
Please let patient know that we cannot move forward with an epidural steroid injection as her hemoglobin A1c was 9 8  Pat please see above    I canceled injection order

## 2020-02-25 NOTE — TELEPHONE ENCOUNTER
S/w pt, advised of the same  Pt verbalized understanding, states she is going to see a new doctor this week to help manage her A1c and she will cb once it is better controlled

## 2020-02-28 ENCOUNTER — TRANSCRIBE ORDERS (OUTPATIENT)
Dept: ADMINISTRATIVE | Facility: HOSPITAL | Age: 55
End: 2020-02-28

## 2020-02-28 DIAGNOSIS — E04.2 MULTINODULAR THYROID: Primary | ICD-10-CM

## 2020-03-02 ENCOUNTER — APPOINTMENT (OUTPATIENT)
Dept: LAB | Age: 55
End: 2020-03-02
Payer: COMMERCIAL

## 2020-03-02 ENCOUNTER — TRANSCRIBE ORDERS (OUTPATIENT)
Dept: ADMINISTRATIVE | Age: 55
End: 2020-03-02

## 2020-03-02 ENCOUNTER — HOSPITAL ENCOUNTER (OUTPATIENT)
Dept: RADIOLOGY | Age: 55
Discharge: HOME | End: 2020-03-02
Payer: COMMERCIAL

## 2020-03-02 DIAGNOSIS — E11.69 TYPE 2 DIABETES MELLITUS WITH OTHER SPECIFIED COMPLICATION, UNSPECIFIED WHETHER LONG TERM INSULIN USE (HCC): ICD-10-CM

## 2020-03-02 DIAGNOSIS — R20.2 PARESTHESIA: ICD-10-CM

## 2020-03-02 DIAGNOSIS — E04.2 MULTINODULAR THYROID: ICD-10-CM

## 2020-03-02 DIAGNOSIS — E11.69 TYPE 2 DIABETES MELLITUS WITH OTHER SPECIFIED COMPLICATION, UNSPECIFIED WHETHER LONG TERM INSULIN USE (HCC): Primary | ICD-10-CM

## 2020-03-02 LAB
ALBUMIN SERPL BCP-MCNC: 3.5 G/DL (ref 3.5–5)
ALP SERPL-CCNC: 94 U/L (ref 46–116)
ALT SERPL W P-5'-P-CCNC: 35 U/L (ref 12–78)
ANION GAP SERPL CALCULATED.3IONS-SCNC: 4 MMOL/L (ref 4–13)
AST SERPL W P-5'-P-CCNC: 18 U/L (ref 5–45)
BILIRUB SERPL-MCNC: 0.3 MG/DL (ref 0.2–1)
BUN SERPL-MCNC: 9 MG/DL (ref 5–25)
CALCIUM SERPL-MCNC: 8.5 MG/DL (ref 8.3–10.1)
CHLORIDE SERPL-SCNC: 107 MMOL/L (ref 100–108)
CHOLEST SERPL-MCNC: 154 MG/DL (ref 50–200)
CO2 SERPL-SCNC: 28 MMOL/L (ref 21–32)
CREAT SERPL-MCNC: 0.5 MG/DL (ref 0.6–1.3)
CREAT UR-MCNC: 39.1 MG/DL
GFR SERPL CREATININE-BSD FRML MDRD: 110 ML/MIN/1.73SQ M
GLUCOSE P FAST SERPL-MCNC: 197 MG/DL (ref 65–99)
HDLC SERPL-MCNC: 31 MG/DL
LDLC SERPL CALC-MCNC: 92 MG/DL (ref 0–100)
MICROALBUMIN UR-MCNC: <5 MG/L (ref 0–20)
MICROALBUMIN/CREAT 24H UR: <13 MG/G CREATININE (ref 0–30)
NONHDLC SERPL-MCNC: 123 MG/DL
POTASSIUM SERPL-SCNC: 4.4 MMOL/L (ref 3.5–5.3)
PROT SERPL-MCNC: 7.1 G/DL (ref 6.4–8.2)
SODIUM SERPL-SCNC: 139 MMOL/L (ref 136–145)
TRIGL SERPL-MCNC: 153 MG/DL
TSH SERPL DL<=0.05 MIU/L-ACNC: 1.44 UIU/ML (ref 0.36–3.74)
VIT B12 SERPL-MCNC: 328 PG/ML (ref 100–900)

## 2020-03-02 PROCEDURE — 82607 VITAMIN B-12: CPT

## 2020-03-02 PROCEDURE — 82043 UR ALBUMIN QUANTITATIVE: CPT | Performed by: INTERNAL MEDICINE

## 2020-03-02 PROCEDURE — 76536 US EXAM OF HEAD AND NECK: CPT

## 2020-03-02 PROCEDURE — 84443 ASSAY THYROID STIM HORMONE: CPT

## 2020-03-02 PROCEDURE — 82570 ASSAY OF URINE CREATININE: CPT | Performed by: INTERNAL MEDICINE

## 2020-03-02 PROCEDURE — 80061 LIPID PANEL: CPT

## 2020-03-02 PROCEDURE — 36415 COLL VENOUS BLD VENIPUNCTURE: CPT

## 2020-03-02 PROCEDURE — 80053 COMPREHEN METABOLIC PANEL: CPT

## 2020-03-04 ENCOUNTER — TELEPHONE (OUTPATIENT)
Dept: PAIN MEDICINE | Facility: MEDICAL CENTER | Age: 55
End: 2020-03-04

## 2020-03-05 ENCOUNTER — TELEPHONE (OUTPATIENT)
Dept: PAIN MEDICINE | Facility: MEDICAL CENTER | Age: 55
End: 2020-03-05

## 2020-03-05 DIAGNOSIS — M70.60 TROCHANTERIC BURSITIS: Primary | ICD-10-CM

## 2020-03-05 DIAGNOSIS — M46.1 SACROILIITIS (HCC): Primary | ICD-10-CM

## 2020-03-05 DIAGNOSIS — M47.816 LUMBAR SPONDYLOSIS: ICD-10-CM

## 2020-03-05 NOTE — TELEPHONE ENCOUNTER
[facility] called pt diclofenac 1 3 % is coming up as a copay of $675 00 on her deductable  The Lidaderm is cheaper but doea need a prior auth   Medication will be a cheaper medication    [facility] [phone]

## 2020-03-05 NOTE — TELEPHONE ENCOUNTER
Did you want to change her prescription to the Lidoderm Patch?    I don't know if it will get approved but I could try

## 2020-03-06 RX ORDER — DICLOFENAC SODIUM 75 MG/1
75 TABLET, DELAYED RELEASE ORAL 2 TIMES DAILY
Qty: 60 TABLET | Refills: 5 | Status: SHIPPED | OUTPATIENT
Start: 2020-03-06 | End: 2020-06-15 | Stop reason: SDUPTHER

## 2020-03-06 NOTE — TELEPHONE ENCOUNTER
Please let patient know about the Flector patch (see below)  I can prescribe voltaren gel instead or the lidocaine patch  Please ask which she would prefer   Lidocaine patches are over the counter if she wants to try them first

## 2020-03-06 NOTE — TELEPHONE ENCOUNTER
md aware  Hopefully sugars get under better control   For now will refill diclofenac and send e-rx for voltaren gel as well

## 2020-03-06 NOTE — TELEPHONE ENCOUNTER
I s/w pt and informed her the Flector Patch NM ordered would cost with her copay  $675 b/c of her deductible  NM rec OTC lidocaine patches or voltaren gel  Pt said she already uses salonpas and theraworks foam and they only take the edge off  Pt began crying b/c she is tired of taking pills  Pt asking what else can be done for bursitis  I said anti-inflammatories and steroid inj are usually used  Pt said she can't do inj b/c her Hgb A1C is too high  The gabapentin causes constipation and elevates her blood sugar  I advised pt to discuss with her diabetic doctor her concerns about the gabapentin elevating her blood sugar and what she needs to do to lower her A1C  Pt said she has appt with him on 3/19  I explained to the pt that she needs to take senokot daily to help with the constipation  Pt wanted to make FQ aware that she had labs done for lipids, and thyroid and a U/S of her thyroid and everything was fine except her triglycerides was slightly elevated  Pt willing to try the voltaren gel and also needs RFfor her diclofenac sent to [facility]  No need to c/b once Rxs sent, her pharmacy will contact her when Rxs are ready

## 2020-03-17 DIAGNOSIS — M79.18 MYOFASCIAL PAIN SYNDROME: ICD-10-CM

## 2020-03-18 RX ORDER — TIZANIDINE 4 MG/1
TABLET ORAL
Qty: 60 TABLET | OUTPATIENT
Start: 2020-03-18

## 2020-03-30 ENCOUNTER — TELEPHONE (OUTPATIENT)
Dept: PAIN MEDICINE | Facility: CLINIC | Age: 55
End: 2020-03-30

## 2020-03-30 DIAGNOSIS — M79.18 MYOFASCIAL PAIN SYNDROME: ICD-10-CM

## 2020-03-30 RX ORDER — TIZANIDINE HYDROCHLORIDE 4 MG/1
CAPSULE, GELATIN COATED ORAL
Qty: 60 CAPSULE | Refills: 2 | Status: SHIPPED | OUTPATIENT
Start: 2020-03-30 | End: 2020-06-15 | Stop reason: SDUPTHER

## 2020-03-30 NOTE — TELEPHONE ENCOUNTER
Pt contacted Call Center requested refill of their medication  Medication Name: Tizanidine       Dosage of Med: 4 mg       Frequency of Med: Take 1 tab BID prn muscle spasms      Remaining Medication: 8       Pharmacy and Location: Pharmacy on file         Pt  Preferred Callback Phone Number:  [phone]      Thank you

## 2020-04-06 ENCOUNTER — TELEPHONE (OUTPATIENT)
Dept: INTERNAL MEDICINE CLINIC | Facility: CLINIC | Age: 55
End: 2020-04-06

## 2020-04-10 ENCOUNTER — TELEMEDICINE (OUTPATIENT)
Dept: INTERNAL MEDICINE CLINIC | Facility: CLINIC | Age: 55
End: 2020-04-10
Payer: COMMERCIAL

## 2020-04-10 DIAGNOSIS — E66.01 CLASS 2 SEVERE OBESITY DUE TO EXCESS CALORIES WITH SERIOUS COMORBIDITY AND BODY MASS INDEX (BMI) OF 35.0 TO 35.9 IN ADULT (HCC): ICD-10-CM

## 2020-04-10 DIAGNOSIS — M43.16 SPONDYLOLISTHESIS AT L3-L4 LEVEL: ICD-10-CM

## 2020-04-10 DIAGNOSIS — E11.65 TYPE 2 DIABETES MELLITUS WITH HYPERGLYCEMIA, WITHOUT LONG-TERM CURRENT USE OF INSULIN (HCC): Primary | ICD-10-CM

## 2020-04-10 PROBLEM — E04.2 NON-TOXIC MULTINODULAR GOITER: Status: ACTIVE | Noted: 2020-04-10

## 2020-04-10 PROBLEM — E66.812 CLASS 2 SEVERE OBESITY DUE TO EXCESS CALORIES WITH SERIOUS COMORBIDITY AND BODY MASS INDEX (BMI) OF 35.0 TO 35.9 IN ADULT (HCC): Status: ACTIVE | Noted: 2020-04-10

## 2020-04-10 PROCEDURE — 99214 OFFICE O/P EST MOD 30 MIN: CPT | Performed by: INTERNAL MEDICINE

## 2020-04-10 RX ORDER — BETA-CAROTENE 7500 MCG
25000 CAPSULE ORAL DAILY
COMMUNITY

## 2020-04-10 RX ORDER — PYRIDOXINE HCL (VITAMIN B6) 100 MG
2 TABLET ORAL DAILY
COMMUNITY
End: 2021-10-12 | Stop reason: ALTCHOICE

## 2020-04-10 RX ORDER — DAPAGLIFLOZIN 5 MG/1
TABLET, FILM COATED ORAL
COMMUNITY
Start: 2020-04-08 | End: 2020-06-16

## 2020-04-13 ENCOUNTER — TRANSCRIBE ORDERS (OUTPATIENT)
Dept: ADMINISTRATIVE | Facility: HOSPITAL | Age: 55
End: 2020-04-13

## 2020-04-13 DIAGNOSIS — E04.1 NONTOXIC UNINODULAR GOITER: Primary | ICD-10-CM

## 2020-06-13 ENCOUNTER — TRANSCRIBE ORDERS (OUTPATIENT)
Dept: ADMINISTRATIVE | Age: 55
End: 2020-06-13

## 2020-06-13 ENCOUNTER — APPOINTMENT (OUTPATIENT)
Dept: LAB | Age: 55
End: 2020-06-13
Payer: COMMERCIAL

## 2020-06-13 DIAGNOSIS — E11.40 TYPE 2 DIABETES MELLITUS WITH DIABETIC NEUROPATHY, UNSPECIFIED WHETHER LONG TERM INSULIN USE (HCC): ICD-10-CM

## 2020-06-13 DIAGNOSIS — E11.21 TYPE 2 DIABETES MELLITUS WITH NEPHROPATHY (HCC): Primary | ICD-10-CM

## 2020-06-13 DIAGNOSIS — E11.21 TYPE 2 DIABETES MELLITUS WITH NEPHROPATHY (HCC): ICD-10-CM

## 2020-06-13 LAB
ANION GAP SERPL CALCULATED.3IONS-SCNC: 3 MMOL/L (ref 4–13)
BASOPHILS # BLD AUTO: 0.06 THOUSANDS/ΜL (ref 0–0.1)
BASOPHILS NFR BLD AUTO: 1 % (ref 0–1)
BUN SERPL-MCNC: 11 MG/DL (ref 5–25)
CALCIUM SERPL-MCNC: 8.3 MG/DL (ref 8.3–10.1)
CHLORIDE SERPL-SCNC: 106 MMOL/L (ref 100–108)
CO2 SERPL-SCNC: 28 MMOL/L (ref 21–32)
CREAT SERPL-MCNC: 0.52 MG/DL (ref 0.6–1.3)
EOSINOPHIL # BLD AUTO: 0.14 THOUSAND/ΜL (ref 0–0.61)
EOSINOPHIL NFR BLD AUTO: 2 % (ref 0–6)
ERYTHROCYTE [DISTWIDTH] IN BLOOD BY AUTOMATED COUNT: 15.7 % (ref 11.6–15.1)
EST. AVERAGE GLUCOSE BLD GHB EST-MCNC: 200 MG/DL
GFR SERPL CREATININE-BSD FRML MDRD: 109 ML/MIN/1.73SQ M
GLUCOSE P FAST SERPL-MCNC: 170 MG/DL (ref 65–99)
HBA1C MFR BLD: 8.6 %
HCT VFR BLD AUTO: 35.2 % (ref 34.8–46.1)
HGB BLD-MCNC: 10.4 G/DL (ref 11.5–15.4)
IMM GRANULOCYTES # BLD AUTO: 0.01 THOUSAND/UL (ref 0–0.2)
IMM GRANULOCYTES NFR BLD AUTO: 0 % (ref 0–2)
LYMPHOCYTES # BLD AUTO: 2.31 THOUSANDS/ΜL (ref 0.6–4.47)
LYMPHOCYTES NFR BLD AUTO: 33 % (ref 14–44)
MCH RBC QN AUTO: 24.4 PG (ref 26.8–34.3)
MCHC RBC AUTO-ENTMCNC: 29.5 G/DL (ref 31.4–37.4)
MCV RBC AUTO: 83 FL (ref 82–98)
MONOCYTES # BLD AUTO: 0.56 THOUSAND/ΜL (ref 0.17–1.22)
MONOCYTES NFR BLD AUTO: 8 % (ref 4–12)
NEUTROPHILS # BLD AUTO: 3.89 THOUSANDS/ΜL (ref 1.85–7.62)
NEUTS SEG NFR BLD AUTO: 56 % (ref 43–75)
NRBC BLD AUTO-RTO: 0 /100 WBCS
PLATELET # BLD AUTO: 360 THOUSANDS/UL (ref 149–390)
PMV BLD AUTO: 9.7 FL (ref 8.9–12.7)
POTASSIUM SERPL-SCNC: 4.1 MMOL/L (ref 3.5–5.3)
RBC # BLD AUTO: 4.26 MILLION/UL (ref 3.81–5.12)
SODIUM SERPL-SCNC: 137 MMOL/L (ref 136–145)
WBC # BLD AUTO: 6.97 THOUSAND/UL (ref 4.31–10.16)

## 2020-06-13 PROCEDURE — 85025 COMPLETE CBC W/AUTO DIFF WBC: CPT

## 2020-06-13 PROCEDURE — 36415 COLL VENOUS BLD VENIPUNCTURE: CPT

## 2020-06-13 PROCEDURE — 3052F HG A1C>EQUAL 8.0%<EQUAL 9.0%: CPT | Performed by: NEUROLOGICAL SURGERY

## 2020-06-13 PROCEDURE — 3066F NEPHROPATHY DOC TX: CPT | Performed by: NEUROLOGICAL SURGERY

## 2020-06-13 PROCEDURE — 80048 BASIC METABOLIC PNL TOTAL CA: CPT

## 2020-06-13 PROCEDURE — 83036 HEMOGLOBIN GLYCOSYLATED A1C: CPT

## 2020-06-15 DIAGNOSIS — M79.18 MYOFASCIAL PAIN SYNDROME: ICD-10-CM

## 2020-06-15 DIAGNOSIS — E11.9 TYPE 2 DIABETES MELLITUS WITHOUT COMPLICATION, WITHOUT LONG-TERM CURRENT USE OF INSULIN (HCC): ICD-10-CM

## 2020-06-15 DIAGNOSIS — M70.60 TROCHANTERIC BURSITIS: ICD-10-CM

## 2020-06-15 DIAGNOSIS — M47.816 LUMBAR SPONDYLOSIS: ICD-10-CM

## 2020-06-15 DIAGNOSIS — M51.16 INTERVERTEBRAL DISC DISORDER WITH RADICULOPATHY OF LUMBAR REGION: ICD-10-CM

## 2020-06-15 RX ORDER — GABAPENTIN 300 MG/1
300 CAPSULE ORAL 3 TIMES DAILY
Qty: 90 CAPSULE | Refills: 5 | Status: SHIPPED | OUTPATIENT
Start: 2020-06-15 | End: 2020-09-08 | Stop reason: ALTCHOICE

## 2020-06-15 RX ORDER — GABAPENTIN 300 MG/1
300 CAPSULE ORAL 3 TIMES DAILY
Qty: 90 CAPSULE | Refills: 0 | Status: CANCELLED | OUTPATIENT
Start: 2020-06-15

## 2020-06-15 RX ORDER — DICLOFENAC SODIUM 75 MG/1
75 TABLET, DELAYED RELEASE ORAL 2 TIMES DAILY
Qty: 60 TABLET | Refills: 0 | Status: CANCELLED | OUTPATIENT
Start: 2020-06-15

## 2020-06-15 RX ORDER — TIZANIDINE HYDROCHLORIDE 4 MG/1
CAPSULE, GELATIN COATED ORAL
Qty: 60 CAPSULE | Refills: 0 | Status: CANCELLED | OUTPATIENT
Start: 2020-06-15

## 2020-06-15 RX ORDER — METFORMIN HYDROCHLORIDE 500 MG/1
TABLET, EXTENDED RELEASE ORAL
Qty: 60 TABLET | Refills: 5 | Status: SHIPPED | OUTPATIENT
Start: 2020-06-15 | End: 2020-07-09

## 2020-06-15 RX ORDER — DICLOFENAC SODIUM 75 MG/1
75 TABLET, DELAYED RELEASE ORAL 2 TIMES DAILY
Qty: 60 TABLET | Refills: 5 | Status: SHIPPED | OUTPATIENT
Start: 2020-06-15 | End: 2020-07-26 | Stop reason: SDUPTHER

## 2020-06-15 RX ORDER — TIZANIDINE HYDROCHLORIDE 4 MG/1
CAPSULE, GELATIN COATED ORAL
Qty: 60 CAPSULE | Refills: 5 | Status: SHIPPED | OUTPATIENT
Start: 2020-06-15 | End: 2020-10-05 | Stop reason: SDUPTHER

## 2020-06-15 NOTE — TELEPHONE ENCOUNTER
Pt informed that RF for tizanidine, gabapentin, diclofenac gel and tablets were all sent to [facility]  Pt said she is working on getting her Hgb A1C done  Pt appreciated the c/b

## 2020-06-16 ENCOUNTER — TELEMEDICINE (OUTPATIENT)
Dept: INTERNAL MEDICINE CLINIC | Facility: CLINIC | Age: 55
End: 2020-06-16
Payer: COMMERCIAL

## 2020-06-16 DIAGNOSIS — E66.01 CLASS 2 SEVERE OBESITY DUE TO EXCESS CALORIES WITH SERIOUS COMORBIDITY AND BODY MASS INDEX (BMI) OF 35.0 TO 35.9 IN ADULT (HCC): ICD-10-CM

## 2020-06-16 DIAGNOSIS — E11.9 TYPE 2 DIABETES MELLITUS WITHOUT COMPLICATION, WITHOUT LONG-TERM CURRENT USE OF INSULIN (HCC): Primary | ICD-10-CM

## 2020-06-16 DIAGNOSIS — M43.16 SPONDYLOLISTHESIS AT L3-L4 LEVEL: ICD-10-CM

## 2020-06-16 DIAGNOSIS — D64.9 ANEMIA, UNSPECIFIED TYPE: ICD-10-CM

## 2020-06-16 DIAGNOSIS — M25.559 HIP PAIN: ICD-10-CM

## 2020-06-16 PROCEDURE — 99214 OFFICE O/P EST MOD 30 MIN: CPT | Performed by: INTERNAL MEDICINE

## 2020-06-17 ENCOUNTER — TELEPHONE (OUTPATIENT)
Dept: INTERNAL MEDICINE CLINIC | Facility: CLINIC | Age: 55
End: 2020-06-17

## 2020-06-17 DIAGNOSIS — E11.9 TYPE 2 DIABETES MELLITUS WITHOUT COMPLICATION, WITHOUT LONG-TERM CURRENT USE OF INSULIN (HCC): Primary | ICD-10-CM

## 2020-06-18 ENCOUNTER — TELEPHONE (OUTPATIENT)
Dept: PAIN MEDICINE | Facility: CLINIC | Age: 55
End: 2020-06-18

## 2020-06-18 ENCOUNTER — CLINICAL SUPPORT (OUTPATIENT)
Dept: INTERNAL MEDICINE CLINIC | Facility: CLINIC | Age: 55
End: 2020-06-18

## 2020-06-18 DIAGNOSIS — E11.65 TYPE 2 DIABETES MELLITUS WITH HYPERGLYCEMIA, WITHOUT LONG-TERM CURRENT USE OF INSULIN (HCC): Primary | ICD-10-CM

## 2020-06-18 RX ORDER — SENNA PLUS 8.6 MG/1
1 TABLET ORAL DAILY
COMMUNITY
End: 2021-10-12 | Stop reason: ALTCHOICE

## 2020-06-18 NOTE — TELEPHONE ENCOUNTER
Received message below from a pharmacist  I contacted the patient today and left message to call back  If she calls back pain can you find out more about her pain symptoms? She unfortunately is not a candidate for steroid injections as her recent hemoglobin A1c was 8 6  If she would like she can increase the gabapentin 300 mg and take 1 tab in the morning, 1 tab in the afternoon and 2 tablets at night  If not interested in this option I would have her schedule an office visit with [facility] to discuss other options      --- Message from WILLIAM JOVEL, Pharmacist sent at 6/18/2020  3:49 PM EDT -----  Luis Medina -    I spoke with Ze Valerio today and she said she has had limited improvement with her new gabapentin dose, which was increased last in 12/2019  Do you think she would benefit from increasing her gabapentin? If you would like her to increase her dose, will you have one of your MA's call her to discuss? Or I can call her to discuss it if you would like

## 2020-06-19 PROBLEM — Z72.89 ALCOHOL USE: Status: ACTIVE | Noted: 2020-06-19

## 2020-06-19 PROBLEM — F10.90 ALCOHOL USE: Status: ACTIVE | Noted: 2020-06-19

## 2020-06-19 PROBLEM — Z78.9 ALCOHOL USE: Status: ACTIVE | Noted: 2020-06-19

## 2020-06-19 NOTE — ASSESSMENT & PLAN NOTE
Patient drink every night   Partly to mask pain and to fall asleep  Advised that this is not healthy  Will try gabapentin hoping that this will decrease her drinking

## 2020-06-20 ENCOUNTER — PATIENT MESSAGE (OUTPATIENT)
Dept: INTERNAL MEDICINE CLINIC | Facility: CLINIC | Age: 55
End: 2020-06-20

## 2020-06-22 ENCOUNTER — DOCUMENTATION (OUTPATIENT)
Dept: INTERNAL MEDICINE CLINIC | Facility: CLINIC | Age: 55
End: 2020-06-22

## 2020-06-22 DIAGNOSIS — E11.65 TYPE 2 DIABETES MELLITUS WITH HYPERGLYCEMIA, WITHOUT LONG-TERM CURRENT USE OF INSULIN (HCC): ICD-10-CM

## 2020-06-23 NOTE — TELEPHONE ENCOUNTER
FYI -  S/w pt, advised of NM's notation  Pt is willing to increase gabapentin and was given instructions for increased dose listed in previous message  Pt verbalized understanding and was appreciative  Advised to cb with any questions or concerns  Pt will cb in 1-2 wks with an update

## 2020-06-29 ENCOUNTER — APPOINTMENT (OUTPATIENT)
Dept: RADIOLOGY | Age: 55
End: 2020-06-29
Payer: COMMERCIAL

## 2020-06-29 DIAGNOSIS — M25.559 HIP PAIN: ICD-10-CM

## 2020-06-29 PROCEDURE — 73522 X-RAY EXAM HIPS BI 3-4 VIEWS: CPT

## 2020-07-01 ENCOUNTER — HOSPITAL ENCOUNTER (OUTPATIENT)
Dept: RADIOLOGY | Facility: HOSPITAL | Age: 55
Discharge: HOME | End: 2020-07-01
Payer: COMMERCIAL

## 2020-07-01 DIAGNOSIS — E04.1 NONTOXIC UNINODULAR GOITER: ICD-10-CM

## 2020-07-01 PROCEDURE — 88172 CYTP DX EVAL FNA 1ST EA SITE: CPT | Performed by: PATHOLOGY

## 2020-07-01 PROCEDURE — 88173 CYTOPATH EVAL FNA REPORT: CPT | Performed by: PATHOLOGY

## 2020-07-01 PROCEDURE — 10005 FNA BX W/US GDN 1ST LES: CPT

## 2020-07-01 RX ORDER — LIDOCAINE HYDROCHLORIDE 10 MG/ML
2 INJECTION, SOLUTION EPIDURAL; INFILTRATION; INTRACAUDAL; PERINEURAL ONCE
Status: COMPLETED | OUTPATIENT
Start: 2020-07-01 | End: 2020-07-01

## 2020-07-01 RX ADMIN — LIDOCAINE HYDROCHLORIDE 2 ML: 10 INJECTION, SOLUTION EPIDURAL; INFILTRATION; INTRACAUDAL; PERINEURAL at 10:03

## 2020-07-02 ENCOUNTER — CONSULT (OUTPATIENT)
Dept: NEUROSURGERY | Facility: CLINIC | Age: 55
End: 2020-07-02
Payer: COMMERCIAL

## 2020-07-02 VITALS
RESPIRATION RATE: 16 BRPM | HEART RATE: 84 BPM | HEIGHT: 68 IN | BODY MASS INDEX: 37.59 KG/M2 | SYSTOLIC BLOOD PRESSURE: 160 MMHG | WEIGHT: 248 LBS | DIASTOLIC BLOOD PRESSURE: 90 MMHG | TEMPERATURE: 97.9 F

## 2020-07-02 DIAGNOSIS — M43.16 SPONDYLOLISTHESIS AT L3-L4 LEVEL: ICD-10-CM

## 2020-07-02 DIAGNOSIS — M54.16 LUMBAR RADICULOPATHY: Primary | ICD-10-CM

## 2020-07-02 PROCEDURE — 3052F HG A1C>EQUAL 8.0%<EQUAL 9.0%: CPT | Performed by: NEUROLOGICAL SURGERY

## 2020-07-02 PROCEDURE — 99244 OFF/OP CNSLTJ NEW/EST MOD 40: CPT | Performed by: NEUROLOGICAL SURGERY

## 2020-07-02 RX ORDER — FERROUS SULFATE 325(65) MG
325 TABLET ORAL
COMMUNITY

## 2020-07-02 NOTE — PROGRESS NOTES
Assessment/Plan:    No problem-specific Assessment & Plan notes found for this encounter  Problem List Items Addressed This Visit        Musculoskeletal and Integument    Spondylolisthesis at L3-L4 level    Relevant Orders    Ambulatory referral to Neurosurgery      Other Visit Diagnoses     Lumbar radiculopathy    -  Primary    Relevant Orders    Ambulatory referral to Neurosurgery            Subjective:      Patient ID: Nesha Botello is a 48 y o  female  HPI    The following portions of the patient's history were reviewed and updated as appropriate:   She  has a past medical history of Non-toxic multinodular goiter  She   Patient Active Problem List    Diagnosis Date Noted    Alcohol use 06/19/2020    Type 2 diabetes mellitus without complication, without long-term current use of insulin ([facility] [address] ) 06/16/2020    Anemia 06/16/2020    Hip pain 06/16/2020    Class 2 severe obesity due to excess calories with serious comorbidity and body mass index (BMI) of 35 0 to 35 9 in [facility]) 04/10/2020    Non-toxic multinodular goiter 04/10/2020    Lumbar spondylosis     Sacroiliitis (HCC)     Type 2 diabetes mellitus with hyperglycemia, without long-term current use of insulin ([facility] [address] ) 08/21/2019    Spondylolisthesis at L3-L4 level 08/21/2019     She  has a past surgical history that includes US guided thyroid biopsy; Stomach surgery; and US guided thyroid biopsy (7/1/2020)  Her family history is not on file  She  reports that she has quit smoking  She has never used smokeless tobacco  She reports that she drinks alcohol  She reports that she does not use drugs    Current Outpatient Medications   Medication Sig Dispense Refill    beta carotene 15666 UNIT capsule Take 25,000 Units by mouth daily      Coenzyme Q10 (CO Q 10) 100 MG CAPS Take 1 capsule by mouth daily      diclofenac (VOLTAREN) 75 mg EC tablet Take 1 tablet (75 mg total) by mouth 2 (two) times a day Take with food 60 tablet 5    diclofenac sodium (VOLTAREN) 1 % Apply 2 g topically 4 (four) times a day 100 g 5    Empagliflozin 25 MG TABS Take 1 tablet (25 mg total) by mouth every morning For diabetes 90 tablet 1    ferrous sulfate 325 (65 Fe) mg tablet Take 325 mg by mouth daily with breakfast      gabapentin (NEURONTIN) 300 mg capsule Take 1 capsule (300 mg total) by mouth 3 (three) times a day ] (Patient taking differently: Take 300 mg by mouth 4 (four) times a day 1 tab in the morning, 1 tab in the afternoon, and two tabs at night) 90 capsule 5    metFORMIN (GLUCOPHAGE-XR) 500 mg 24 hr tablet One tab daily and increase to two tabs daily if tolerated (Patient taking differently: Take 1,000 mg by mouth 2 (two) times a day with meals One tab daily and increase to two tabs daily if tolerated) 60 tablet 5    Milk Thistle 200 MG CAPS Take 2 capsules by mouth daily      Omega-3 Fatty Acids (PX SUPER EPA FISH OIL PO) Take 1 capsule by mouth daily      ONETOUCH VERIO test strip Twice a day 180 each 0    TiZANidine (ZANAFLEX) 4 MG capsule Take 1 tab BID prn muscle spasms 60 capsule 5    Multiple Vitamins-Minerals (PRESERVISION AREDS PO) Take 1 capsule by mouth      senna (SENOKOT) 8 6 MG tablet Take 1 tablet by mouth daily       No current facility-administered medications for this visit        Current Outpatient Medications on File Prior to Visit   Medication Sig    beta carotene 24788 UNIT capsule Take 25,000 Units by mouth daily    Coenzyme Q10 (CO Q 10) 100 MG CAPS Take 1 capsule by mouth daily    diclofenac (VOLTAREN) 75 mg EC tablet Take 1 tablet (75 mg total) by mouth 2 (two) times a day Take with food    diclofenac sodium (VOLTAREN) 1 % Apply 2 g topically 4 (four) times a day    Empagliflozin 25 MG TABS Take 1 tablet (25 mg total) by mouth every morning For diabetes    ferrous sulfate 325 (65 Fe) mg tablet Take 325 mg by mouth daily with breakfast    gabapentin (NEURONTIN) 300 mg capsule Take 1 capsule (300 mg total) by mouth 3 (three) times a day ] (Patient taking differently: Take 300 mg by mouth 4 (four) times a day 1 tab in the morning, 1 tab in the afternoon, and two tabs at night)    metFORMIN (GLUCOPHAGE-XR) 500 mg 24 hr tablet One tab daily and increase to two tabs daily if tolerated (Patient taking differently: Take 1,000 mg by mouth 2 (two) times a day with meals One tab daily and increase to two tabs daily if tolerated)    Milk Thistle 200 MG CAPS Take 2 capsules by mouth daily    Omega-3 Fatty Acids (PX SUPER EPA FISH OIL PO) Take 1 capsule by mouth daily    ONETOUCH VERIO test strip Twice a day    TiZANidine (ZANAFLEX) 4 MG capsule Take 1 tab BID prn muscle spasms    Multiple Vitamins-Minerals (PRESERVISION AREDS PO) Take 1 capsule by mouth    senna (SENOKOT) 8 6 MG tablet Take 1 tablet by mouth daily     Current Facility-Administered Medications on File Prior to Visit   Medication    [COMPLETED] lidocaine (PF) (XYLOCAINE-MPF) 1 % injection 2 mL     She has No Known Allergies       Review of Systems   Constitutional: Positive for activity change (difficulty bending and lifting and being active due to pain)  HENT: Negative  Negative for trouble swallowing  Eyes: Negative  Respiratory: Negative  Cardiovascular: Negative  Gastrointestinal: Positive for constipation  Endocrine: Negative  Genitourinary: Negative  Musculoskeletal: Positive for arthralgias, back pain (5 year back pain mainly in Right buttock, hip, and thigh pain  ) and myalgias (muscle cramps)  Takes tizanidine, gabapentin, and diclofenac for pain    7/10 pain level today  Tried AVTAR, no recent physical   Skin: Negative  Allergic/Immunologic: Negative  Neurological: Positive for weakness (b/l legs) and numbness (right leg)  Hematological: Does not bruise/bleed easily  Psychiatric/Behavioral: Negative for sleep disturbance  Objective: There were no vitals taken for this visit           Physical Exam      I have personally obtain history and examined patient  I have personally reviewed case including all pertinent investigations/studies  Time spent 60 minutes  More than 50% of total time spent on counseling and coordination of care as described above including patient education, discussion of risks and rationale of conservative vs surgical treatment options  HPI    Chronic low back pain > 5-6 yrs, worse with activity with associated R>L thigh/hamstring pain  AVTAR of some benefit, on nsaids and tizanidine  DM with HA1C 8 6  Denies bowel and bladder deficit  Exam    Pain limited exam  Severe paravertebral spasm  Limited lumbar ROM  Full strength bilateral LE  Negative SLR  Intact sensation  Symmetrically depressed DTR  Antalgic gait  Able to negotiated heel and toe walk  Able to negotiate tandem                        Back:     Symmetric, no curvature, ROM abnormal, no CVA tenderness       Chest wall:    No tenderness or deformity                   Extremities:   Extremities normal, atraumatic, no cyanosis or edema       Skin:   Skin color, texture, turgor normal, no rashes or lesions     Radiology    MRI    Severe L3/4 spondylotic degeneration with near complete disc space collapse, grade 1 anterolisthesis in the setting of marked facet arthropathy  Mild central canal narrowing with L>R foraminal involvement    Summary and Plan    Ms Darci Haynes has chronic back dominant pain in the severe of severe L3/4 spondylotic degeneration with spondylolisthesis  Today we reviewed the conservative options including PT, AVTAR and medications  I explained to her that surgery would offer her no guarantee of improvement with elevated risk of complications lia in the setting of suboptimal diabetes management  I stressed the need to reduce her HA1C < 7  I encouraged her to remain active and has referred her to Dr Alonzo Jensen for further appraisal  I will see her on a PRN basis

## 2020-07-06 ENCOUNTER — DOCUMENTATION (OUTPATIENT)
Dept: INTERNAL MEDICINE CLINIC | Facility: CLINIC | Age: 55
End: 2020-07-06

## 2020-07-06 DIAGNOSIS — E11.65 TYPE 2 DIABETES MELLITUS WITH HYPERGLYCEMIA, WITHOUT LONG-TERM CURRENT USE OF INSULIN (HCC): Primary | ICD-10-CM

## 2020-07-06 NOTE — PROGRESS NOTES
Yasmany, PharmD, BCACP    Reason for visit: Appointment with 48y o  year old for management of T2DM monitoring efficacy and tolerability of anti-diabetic medications  This virtual check-in was done via phone, call received from patient  Encounter provider: Marybeth Dunne, Pharmacist    Patient agrees to participate in a virtual check in via telephone or video visit instead of presenting to the office to address urgent/immediate medical needs  After connecting through telephone, the patient was identified by name and date of birth  Katy Petty was informed that this was a telemedicine visit and that the exam was being conducted confidentially over secure lines  My office door was closed  No one else was in the room  Katy Petty acknowledged consent and understanding of privacy and security of the telemedicine visit  I informed the patient that I have reviewed She record in Epic and presented the opportunity for She to ask any questions regarding the visit today  The patient agreed to participate  Current DM Regimen:  · Empagliflozin 25mg daily  · Metformin XR 2gm/day    ASSESSMENT/PLAN                                                                                     1  Type 2 diabetes: goal A1c <7% based on 2020 ADA guidelines  Most recent A1c above goal     Completed thyroid u/s but has not reviewed results with endo as she cancelled appt  May benefit from addition of oral GLP1 if deemed appropriate by endo  Reported SMBG readings to goal during the past few days since she has made lifestyle modifications; FBG readings above goal but has not checked this since lifestyle modifications  Patient with possible UTI s/sx but have self-resolved  BMP shows hyperglycemia        Most recent labs and diabetes goals discussed with patient in clinic today   MEDICATIONS: Patient aware SGLT2i can increase risk of UTI but would like to continue rx for now  If PCP is in agreeance, will continue with current regimen for now    o Patient agrees to contact endo to review thyroid u/s results   SMBG: BID   TLCs: Re-enforced the importance of a Diabetic Diet, exercise 30 minutes 5 days a week as tolerated, weight loss/control  Follow-Up:  via phone  _x_ Home Monitoring Records, BG      SUBJECTIVE                                                                                                            1  Medication Adherence: Medication list reviewed with patient, reports the following discrepancies/problems:    Would like to get diabetes controlled by  prior to next neurosurgery appt, would like to follow with pharmacist for mgmt  Did not go to endo appt on  and did not r/s appt    2  Medication Efficacy:    Review of Systems   Genitourinary:        Felt slight itching since converting to empagliflozin but s/sx have self-resolved over the weekend  Had similar s/sx when she started dapagliflozin but states s/sx resolved as she was on rx longer     SMBG readings (no log, per memory): checks once daily whenever she remembers  FB's but has not checked at this time for a while  Pre-supper B  1 hour post-supper B    3  Lifestyle: has been trying to watch what she is eating and trying to eat more vegan on   Rarely eats bread or pasta, had a spoonful of pasta x1 over the weekend but has also reduced CHO consumption       Has also reduced alcohol consumption      OBJECTIVE                                                                                                      Previous DM medications trialed:  · Dapagliflozin: converted to empagliflozin in 2020    Pertinent Lab Data:    Lab Results   Component Value Date    SODIUM 137 2020    K 4 1 2020     2020    CO2 28 2020    BUN 11 2020    CREATININE 0 52 (L) 2020    CALCIUM 8 3 2020     Lab Results Component Value Date    HGBA1C 8 6 (H) 06/13/2020     Lab Results   Component Value Date    [id number] 328 03/02/2020     Microalbumin/Creatinine: < 13 (3/2020)    Demographics  Interaction Method: Phone  Type of Intervention: Follow-Up    Topic(s) Addressed  Diabetes    Intervention(s) Made      Non-Pharmacologic:      Other: Endo, SMBG    Tool(s) Used  Not Applicable    Time Spent:   Time Spent in Direct Patient Care: 25 minutes    Time Spent in Care Coordination: 10 minutes    Recommendation(s) Accepted by the Patient/Caregiver:  All Accepted

## 2020-07-09 ENCOUNTER — TELEPHONE (OUTPATIENT)
Dept: INTERNAL MEDICINE CLINIC | Facility: CLINIC | Age: 55
End: 2020-07-09

## 2020-07-09 ENCOUNTER — CLINICAL SUPPORT (OUTPATIENT)
Dept: INTERNAL MEDICINE CLINIC | Facility: CLINIC | Age: 55
End: 2020-07-09

## 2020-07-09 DIAGNOSIS — E11.65 TYPE 2 DIABETES MELLITUS WITH HYPERGLYCEMIA, WITHOUT LONG-TERM CURRENT USE OF INSULIN (HCC): Primary | ICD-10-CM

## 2020-07-09 DIAGNOSIS — E11.65 TYPE 2 DIABETES MELLITUS WITH HYPERGLYCEMIA, WITHOUT LONG-TERM CURRENT USE OF INSULIN (HCC): ICD-10-CM

## 2020-07-09 RX ORDER — LANCETS 33 GAUGE
1 EACH MISCELLANEOUS DAILY
Qty: 100 EACH | Refills: 1 | Status: SHIPPED | OUTPATIENT
Start: 2020-07-09

## 2020-07-09 RX ORDER — BLOOD-GLUCOSE METER
EACH MISCELLANEOUS ONCE
Qty: 1 KIT | Refills: 0 | Status: SHIPPED | OUTPATIENT
Start: 2020-07-09 | End: 2020-07-30

## 2020-07-09 RX ORDER — LANCETS 33 GAUGE
1 EACH MISCELLANEOUS DAILY
Qty: 100 EACH | Refills: 1 | Status: SHIPPED | OUTPATIENT
Start: 2020-07-09 | End: 2020-07-09 | Stop reason: SDUPTHER

## 2020-07-09 RX ORDER — METFORMIN HYDROCHLORIDE 500 MG/1
1000 TABLET, EXTENDED RELEASE ORAL 2 TIMES DAILY WITH MEALS
Qty: 360 TABLET | Refills: 1 | Status: SHIPPED | OUTPATIENT
Start: 2020-07-09 | End: 2020-07-09 | Stop reason: SDUPTHER

## 2020-07-09 RX ORDER — BLOOD-GLUCOSE METER
EACH MISCELLANEOUS ONCE
Qty: 1 KIT | Refills: 0 | Status: SHIPPED | OUTPATIENT
Start: 2020-07-09 | End: 2020-07-09 | Stop reason: SDUPTHER

## 2020-07-09 RX ORDER — METFORMIN HYDROCHLORIDE 500 MG/1
1000 TABLET, EXTENDED RELEASE ORAL 2 TIMES DAILY WITH MEALS
Qty: 360 TABLET | Refills: 1 | Status: SHIPPED | OUTPATIENT
Start: 2020-07-09 | End: 2021-01-19

## 2020-07-09 NOTE — TELEPHONE ENCOUNTER
Per pharmacist encounter on 07/09/20, pending orders for signature/concurrence from Severo Pock, DO    One touch glucometer    Metformin XR   Test strips   Lancets    Adding pain provider on this encounter as patient would like to receive diclofenac (tablet and gel), gabapentin and tizanidine from  [facility]  Please assist with entering new rx to be sent ot this pharmacy  Thanks!

## 2020-07-09 NOTE — PROGRESS NOTES
Yasmany, PharmD, BCACP    Reason for visit: Appointment with 48y o  year old for management of T2DM monitoring efficacy and tolerability of anti-diabetic medications  This virtual check-in was done via phone  Encounter provider: Denise Shipman, Pharmacist    Patient agrees to participate in a virtual check in via telephone or video visit instead of presenting to the office to address urgent/immediate medical needs  After connecting through telephone, the patient was identified by name and date of birth  Smoothharper Gabriel was informed that this was a telemedicine visit and that the exam was being conducted confidentially over secure lines  My office door was closed  No one else was in the room  Smoothadore Gabriel acknowledged consent and understanding of privacy and security of the telemedicine visit  I informed the patient that I have reviewed She record in Epic and presented the opportunity for She to ask any questions regarding the visit today  The patient agreed to participate  Current DM Regimen:  · Empagliflozin 25mg daily  · Metformin XR 2gm/day    ASSESSMENT/PLAN                                                                                     1  Type 2 diabetes: goal A1c <7% based on 2020 ADA guidelines  Most recent A1c above goal but patient has made significant lifestyle modificaitons since  Reported SMBG to goal d/t lifestyle changes  Given h/o gastric bypass, patient would benefit from referral to nutrition to verify patient's current nutrient dietary intake  Per discussion with neurosurgeron, patient will need to update A1c prior to surgery; preferred to have A1c < 7 5%  BMP shows hyperglycemia       Microvascular complications: moderate retinopaty  Macrovascular complications: none  (No) Aspirin (No) Statin (No) ACEI/ARB     Most recent labs and diabetes goals discussed with patient in clinic today   MEDICATIONS: If PCP is in agreeance, will continue with current regimen for now    o Will pend rx for PCP signature/concurrence   SMBG: BID for now to allow patient to understand how SMBG readings fluctuate with food intake   TLCs: Discussion with patient on benefit of meeting with dietician to verify she is obtaining enough nutrients during the day, patient plans to call her gastric bypass team to set up appt but will contact PCP if she needs a new referral    Reviewed apps with patient to try using to track SMBG and CHO  Patient reports she is not able to sync One Touch Verio IQ with One Touch reveal glendy; if PCP is in agreeance, will pend new glucometer for patient to  from pharmacy  2  Medication Reconciliation: Medication list reviewed with pt at today's visit  Discrepancies as discussed below  - Medication list updated to reflect medications pt is currently taking    Follow-Up: 3 weeks via phone  _x_ Home Monitoring Records, BG  _x_ Labs, A1c      SUBJECTIVE                                                                                                              1  Medication Adherence: Medication list reviewed with patient, reports the following discrepancies/problems:   Ferrous Sulfate: resumed taking supplement as she felt she was obtaining less iron with vegan diet    Misses doses:  No    Would like all rx's sent to [facility]    Took all DM and BP medications this AM: Yes    Discussed thyroid results with endocrinology, reports thyroid U/S was negative; uncertain if she will r/s endo appt    2  Medication Efficacy:    Review of Systems   Gastrointestinal: Negative for diarrhea and nausea  Genitourinary:        Denies UTI s/sx     3  Lifestyle: continues with vegan lifestyle and feels that this would be sustainable for her to continue but may only follow this every 3-4 days d/t concerns for not receiving enough nutrients  Does not eat pasta as she gets dumping syndrome  Switched rice to quinoa  No potatoes  Started to eat more fruits and vegetables throughout the day  Last met with dietician prior to gastric bypass surgery, denies meeting with dietician to review diabetes diet but feels she understands how foods impact SMBG but interested to track food impact on SMBG further  OBJECTIVE                                                                                                      SMBG readings (-): checking BID  FB, 111, 127  30-min Post-lunch B  Pre-Supper B  HS B, 125, 138 (had a salad with a lot of beans)    Pertinent Lab Data:    Lab Results   Component Value Date    SODIUM 137 2020    K 4 1 2020     2020    CO2 28 2020    BUN 11 2020    CREATININE 0 52 (L) 2020    CALCIUM 8 3 2020     Lab Results   Component Value Date    HGBA1C 8 6 (H) 2020     Lab Results   Component Value Date    [id number] 328 2020       Microalbumin/Creatinine: < 13 (3/2020)    Demographics  Interaction Method: Phone  Type of Intervention: Follow-Up    Topic(s) Addressed  Diabetes    Intervention(s) Made      Non-Pharmacologic:      Other: Dietician, SMBG, SMBG apps    Tool(s) Used  Not Applicable    Time Spent:   Time Spent in Direct Patient Care: 40 minutes    Time Spent in Care Coordination: 10 minutes    Recommendation(s) Accepted by the Patient/Caregiver:  All Accepted

## 2020-07-20 ENCOUNTER — TELEPHONE (OUTPATIENT)
Dept: RADIOLOGY | Facility: CLINIC | Age: 55
End: 2020-07-20

## 2020-07-20 ENCOUNTER — DOCUMENTATION (OUTPATIENT)
Dept: INTERNAL MEDICINE CLINIC | Facility: CLINIC | Age: 55
End: 2020-07-20

## 2020-07-20 DIAGNOSIS — M79.18 MYOFASCIAL PAIN SYNDROME: Primary | ICD-10-CM

## 2020-07-20 RX ORDER — DULOXETIN HYDROCHLORIDE 30 MG/1
30 CAPSULE, DELAYED RELEASE ORAL DAILY
Qty: 30 CAPSULE | Refills: 1 | Status: SHIPPED | OUTPATIENT
Start: 2020-07-20 | End: 2020-09-09 | Stop reason: SDUPTHER

## 2020-07-20 NOTE — TELEPHONE ENCOUNTER
----- Message from Andrea Harrison Jayesh  sent at 7/20/2020 12:10 PM EDT -----  Regarding: FW: Gabapentin  Can you contact this patient and ask her about the gabapentin  If having side effects like this pharmacist is stating, she can wean off  I am not sure the dose she is taking  It says on the med list 1 in the am, 1 in the afternoon, and 2 at night  If that the case, she can decrease 1 pill every 3 days until stops  I can prescribed cymbalta for the pain in its place if interested  She can start this med before coming off gabapentin  Leon Matthew   ----- Message -----  From: Jose Joyce, Pharmacist  Sent: 7/20/2020  11:20 AM EDT  To: IZABELA Harrison  Subject: Gabapentin                                       Hi Nico Yuen called me today and said that she has noticed a significant increase in mood swings since increasing her gabapentin to 600mg BID (depression, agitation, uncontrollable crying); denies any other changes  Reports  has also expressed concerns to patient  Reports neuropathy s/sx have worsened since increasing gabapentin dose  Denies previous trial with duloxetine or venlafaxine  While we were on the phone, she began to cry twice for no reason       Gabapentin does have a 2% instance for causing depression/emotional lability and 8% for causing hostility  Would you be okay if she were to reduce her dose to 300mg BID x1 week then stop the rx? I'm hesitant to recommend completely stopping rx as stopping abruptly could induce a seizure and she did okay on the lower dose  She would also be interested in starting venlafaxine 75mg daily  Let me know if you would like me to f/u with the patient or if you would like me to pend the rx's for your signature  I do have a f/u appt scheduled with her on 7/30 so I can make sure she is doing better at that time and remind her to stop the gabapentin       Thanks for all your help,  Jose Joyce, PharmD, BCACP

## 2020-07-20 NOTE — TELEPHONE ENCOUNTER
I s/w pt and she confirmed she was having s/e as described by [facility] the pharmacist in Dr Ravinder Rodriguez office  Pt does want to wean off the gabapentin, she was taking 300 mg (2) in AM and (2) in evening  I advised pt to decrease by 1 pill Q 3 days and then stop  Pt took her (2) pills this AM    I advised her to take (2) in AM and (1) in PM x 3 days then (1) in AM and (1) in PM for 3 days then (1) in PM for 3 days then stop  Pt verbalized understanding of weaning  Pt wants to try the cymbalta  Uses new pharmacy [facility] on file  Pt wants to know the dosage and when she is to start it

## 2020-07-20 NOTE — PROGRESS NOTES
Yasmany, PharmD, Smita Musa    The following is per review of patient's pertinent medical/medication history and phone call with patient:    Reason for documentation: VM received from patient  Subjective/Objective: Currently taking gabapentin 600mg BID  Since increasing dose she has noticed increase in mood swings (depression, agitation, uncontrollable crying); denies any other changes  Reports  has also expressed concerns to patient  Reports neuropathy s/sx have worsened since increasing gabapentin dose  Denies previous trial with duloxetine or venlafaxine  SMBG readings: has been waking up , and SMBG has been > 150 once since pharm encounter on 7/9  Patient began for unclear etiology twice while on the phone with the pharmacist      Assessment/Plan:  Mood changes: gabapentin has 2% instance for causing depression/emotional lability and 8% for causing hostility  Given change in mood, patient would benefit from reducing gabapentin dose with goal to stop rx; recommended to taper dose to prevent inducing seizure  May benefit from starting SNRI, such as venlafaxine 75mg daily  · Recommend reducing gabapentin dose to 300mg BID until pharm appt on 7/30 and start venlafaxine 75mg daily  · Will alert pain mgmt to the above recommendations  PCP: no further action at this time  Demographics  Interaction Method: Phone  Type of Intervention: Follow-Up    Topic(s) Addressed  Diabetes    Intervention(s) Made    Pharmacologic:  Medication Initiation: venlafaxine    Medication Discontinuation: gabapentin    Prevent or Manage Adverse Drug Reaction: gabapentin    Tool(s) Used  Not Applicable    Time Spent:   Time Spent in Direct Patient Care: 15 minutes    Time Spent in Care Coordination: 10 minutes    Recommendation(s) Accepted by the Patient/Caregiver:  All Accepted

## 2020-07-20 NOTE — TELEPHONE ENCOUNTER
I s/w pt and informed her of the same as stated in NM's task  Pt willing to start the cymbalta now  Pt told to c/b after 2-3 wks with update for NM on how the cymbalta is working  Pt appreciative

## 2020-07-20 NOTE — TELEPHONE ENCOUNTER
30 mg daily  Its up to her, she can start today or if prefers she can start when off gabapentin       It can take about 3 weeks to start to notice if effective, so I would recommend starting now    Sent to [facility]

## 2020-07-24 ENCOUNTER — TELEPHONE (OUTPATIENT)
Dept: PAIN MEDICINE | Facility: CLINIC | Age: 55
End: 2020-07-24

## 2020-07-24 NOTE — TELEPHONE ENCOUNTER
S/w pt  Pt does have refills avail at [facility], however pt states she recently switched pharmacies  Per pt request, please send script to [facility] on file  Pt has 5 days of medication remaining  Thank you

## 2020-07-26 DIAGNOSIS — M47.816 LUMBAR SPONDYLOSIS: ICD-10-CM

## 2020-07-26 RX ORDER — DICLOFENAC SODIUM 75 MG/1
75 TABLET, DELAYED RELEASE ORAL 2 TIMES DAILY
Qty: 60 TABLET | Refills: 5 | Status: SHIPPED | OUTPATIENT
Start: 2020-07-26 | End: 2020-09-28 | Stop reason: SDUPTHER

## 2020-07-28 NOTE — TELEPHONE ENCOUNTER
Left detailed vm (ok per ANNETTE) that Omer Efrains did send refills for the Voltaren tablets to her [employer]  In the future she can ask [facility] to transfer the scripts to [facility] as long as it is not a controlled substance  No need for pt to c/b unless she has a question  C/B # provided

## 2020-07-30 ENCOUNTER — CLINICAL SUPPORT (OUTPATIENT)
Dept: INTERNAL MEDICINE CLINIC | Facility: CLINIC | Age: 55
End: 2020-07-30

## 2020-07-30 DIAGNOSIS — E11.65 TYPE 2 DIABETES MELLITUS WITH HYPERGLYCEMIA, WITHOUT LONG-TERM CURRENT USE OF INSULIN (HCC): Primary | ICD-10-CM

## 2020-07-30 NOTE — PROGRESS NOTES
Yasmany, PharmD, BCACP    Reason for visit: Appointment with 48y o  year old for management of T2DM monitoring efficacy and tolerability of anti-diabetic medications  This virtual check-in was done via phone  Encounter provider: Julia Alfaro, Pharmacist    Patient agrees to participate in a virtual check in via telephone or video visit instead of presenting to the office to address urgent/immediate medical needs  After connecting through telephone, the patient was identified by name and date of birth  Shaista Lopez was informed that this was a telemedicine visit and that the exam was being conducted confidentially over secure lines  My office door was closed  No one else was in the room  Shaista Lopez acknowledged consent and understanding of privacy and security of the telemedicine visit  I informed the patient that I have reviewed She record in Epic and presented the opportunity for She to ask any questions regarding the visit today  The patient agreed to participate  Current DM Regimen:  · Empagliflozin 25mg daily  · Metformin XR 2gm/day    ASSESSMENT/PLAN                                                                                     1  Type 2 diabetes: goal A1c <7% based on 2020 ADA guidelines  Most recent A1c above goal but patient has made significant lifestyle modificaitons since  Reported SMBG to goal     Patient with significant weight loss  Per discussion with neurosurgeron, patient will need to update A1c prior to surgery; preferred to have A1c < 7 5%  BMP shows hyperglycemia      Microvascular complications: moderate retinopathy  Macrovascular complications: none  (No) Aspirin     (No) Statin       (No) ACEI/ARB     Most recent labs and diabetes goals discussed with patient in clinic today   MEDICATIONS: If PCP is in agreeance, will continue with current regimen  o Will pend rx for PCP signature/concurrence   SMBG: BID per patient preference   TLCs: Re-enforced the importance of a Diabetic Diet, exercise 30 minutes 5 days a week as tolerated, weight loss/control  2   Medication Reconciliation: Medication list reviewed with pt at today's visit  Discrepancies as discussed below  - Medication list updated to reflect medications pt is currently taking   - Encouraged patient to schedule f/u appt with bariatric dietician, voiced understanding    Follow-Up: will hold off on scheduling f/u appt at this time until patient updates A1c  _x_ Home Monitoring Records, BG  _x_ Labs, as ordered by PCP      SUBJECTIVE                                                                                                              1  Medication Adherence: Medication list reviewed with patient, reports the following discrepancies/problems:   Gabapentin: currently taking one pill daily, has one more day on rx then will stop on 8/1   Duloxetine: has been taking rx daily  2  Medication Efficacy:    Review of Systems   Constitutional: Negative for fatigue (improved)  3  Lifestyle: continues with vegan lifestyle and feels that this would be sustainable for her to continue  Does not eat pasta as she gets dumping syndrome  Switched rice to quinoa  No potatoes  Started to eat more fruits and vegetables throughout the day  Has not scheduled an appt with bariatric dietician    Has lost 22 pounds in the past month         Social History     Tobacco Use   Smoking Status Former Smoker   Smokeless Tobacco Never Used     Social History     Substance and Sexual Activity   Alcohol Use Yes    Frequency: 4 or more times a week    Drinks per session: 3 or 4    Comment: social           OBJECTIVE                                                                                                      SMBG readings (7/13-7/30):  FBG Average: 126 mg/dl ( mg/dl), n=16, 0/16= < 70 mg/dl Pre-Lunch BG Average: 126 mg/dl (118-131 mg/dl), n=3, 0/3= < 70 mg/dl   Pre-Supper BG Average: 130 mg/dl (108-154 mg/dl), n=9, 0/9= < 70 mg/dl   HS BG Average: 138 mg/dl (125-155 mg/dl), n=6, 0/6= < 70 mg/dl     Pertinent Lab Data:    Lab Results   Component Value Date    SODIUM 137 06/13/2020    K 4 1 06/13/2020     06/13/2020    CO2 28 06/13/2020    BUN 11 06/13/2020    CREATININE 0 52 (L) 06/13/2020    CALCIUM 8 3 06/13/2020       Lab Results   Component Value Date    HGBA1C 8 6 (H) 06/13/2020       Lab Results   Component Value Date    [id number] 328 03/02/2020     Microalbumin/Creatinine: < 13 (3/2020)    Demographics  Interaction Method: Phone  Type of Intervention: Follow-Up    Topic(s) Addressed  Diabetes    Intervention(s) Made      Non-Pharmacologic:      Other    Tool(s) Used  Not Applicable    Time Spent:   Time Spent in Direct Patient Care: 30 minutes    Time Spent in Care Coordination: 10 minutes    Recommendation(s) Accepted by the Patient/Caregiver:  All Accepted

## 2020-08-04 DIAGNOSIS — Z12.39 SCREENING FOR MALIGNANT NEOPLASM OF BREAST: Primary | ICD-10-CM

## 2020-08-06 ENCOUNTER — OFFICE VISIT (OUTPATIENT)
Dept: NEUROSURGERY | Facility: CLINIC | Age: 55
End: 2020-08-06
Payer: COMMERCIAL

## 2020-08-06 VITALS
WEIGHT: 234 LBS | TEMPERATURE: 97.6 F | HEIGHT: 68 IN | RESPIRATION RATE: 16 BRPM | BODY MASS INDEX: 35.46 KG/M2 | DIASTOLIC BLOOD PRESSURE: 86 MMHG | SYSTOLIC BLOOD PRESSURE: 144 MMHG | HEART RATE: 85 BPM

## 2020-08-06 DIAGNOSIS — M43.16 SPONDYLOLISTHESIS AT L3-L4 LEVEL: ICD-10-CM

## 2020-08-06 DIAGNOSIS — M54.16 LUMBAR RADICULOPATHY: ICD-10-CM

## 2020-08-06 PROCEDURE — 1036F TOBACCO NON-USER: CPT | Performed by: NEUROLOGICAL SURGERY

## 2020-08-06 PROCEDURE — 3008F BODY MASS INDEX DOCD: CPT | Performed by: NEUROLOGICAL SURGERY

## 2020-08-06 PROCEDURE — 3052F HG A1C>EQUAL 8.0%<EQUAL 9.0%: CPT | Performed by: NEUROLOGICAL SURGERY

## 2020-08-06 PROCEDURE — 3066F NEPHROPATHY DOC TX: CPT | Performed by: NEUROLOGICAL SURGERY

## 2020-08-06 PROCEDURE — 99215 OFFICE O/P EST HI 40 MIN: CPT | Performed by: NEUROLOGICAL SURGERY

## 2020-08-06 NOTE — PROGRESS NOTES
Assessment/Plan:    No problem-specific Assessment & Plan notes found for this encounter  History, physical examination and diagnostic tests were reviewed and questions answered  Diagnosis, care plan and treatment options were discussed  The patient understand instructions and will follow up as directed  Patient with L3-4 spondylolisthesis with lateral recess and bilateral foraminal stenosis  I do feel this is concordant with her symptoms  Therefore she is a candidate for a L3-4 lumbar decompression and fusions with or without interbody  I fully discussed the surgery the expected post operative course and complications of the surgery  She would like additional time to discuss with family  IMAGING REVIEWED: MRI lumbar shows L3-4 grade 1 spondy with bilateral stenosis foraminal and lateral recess  Diagnoses and all orders for this visit:    Spondylolisthesis at L3-L4 level  -     Ambulatory referral to Neurosurgery    Lumbar radiculopathy  -     Ambulatory referral to Neurosurgery        I have spent 40 minutes with Patient and family today in which greater than 50% of this time was spent in counseling/coordination of care regarding Diagnostic results, Prognosis, Risks and benefits of tx options, Intructions for management, Patient and family education, Importance of tx compliance, Risk factor reductions and Impressions  Subjective:      Patient ID: Anthony Andrew is a 48 y o  female  Patient is a 48year old female with symptoms of bilateral right greater than left leg pain  Pain is severe, and throbbing in quality  Pain distribution is right leg low back some sx on the left  Pain is worse with standing, ambulation  Pain is improved by leaning forward at times  The pain has been present for several years  Saw Bertha Ibanez and referred to me for further evaluation  Has tried multiple conservative options        The following portions of the patient's history were reviewed and updated as appropriate:   She  has a past medical history of Non-toxic multinodular goiter  She   Patient Active Problem List    Diagnosis Date Noted    Alcohol use 06/19/2020    Type 2 diabetes mellitus without complication, without long-term current use of insulin ([facility] [address] ) 06/16/2020    Anemia 06/16/2020    Hip pain 06/16/2020    Class 2 severe obesity due to excess calories with serious comorbidity and body mass index (BMI) of 35 0 to 35 9 in adult [facility]) 04/10/2020    Non-toxic multinodular goiter 04/10/2020    Lumbar spondylosis     Sacroiliitis (HCC)     Type 2 diabetes mellitus with hyperglycemia, without long-term current use of insulin ([facility] [address] ) 08/21/2019    Spondylolisthesis at L3-L4 level 08/21/2019     She  has a past surgical history that includes US guided thyroid biopsy; Stomach surgery; and US guided thyroid biopsy (7/1/2020)  Her family history is not on file  She  reports that she has quit smoking  She has never used smokeless tobacco  She reports current alcohol use  She reports that she does not use drugs    Current Outpatient Medications   Medication Sig Dispense Refill    beta carotene 17880 UNIT capsule Take 25,000 Units by mouth daily      Coenzyme Q10 (CO Q 10) 100 MG CAPS Take 1 capsule by mouth daily      diclofenac (VOLTAREN) 75 mg EC tablet Take 1 tablet (75 mg total) by mouth 2 (two) times a day Take with food 60 tablet 5    DULoxetine (CYMBALTA) 30 mg delayed release capsule Take 1 capsule (30 mg total) by mouth daily 30 capsule 1    ferrous sulfate 325 (65 Fe) mg tablet Take 325 mg by mouth daily with breakfast      glucose blood (OneTouch Verio) test strip Use as instructed 200 each 1    Lancets (ONETOUCH DELICA PLUS [id number]) MISC 1 each by Does not apply route daily 100 each 1    metFORMIN (GLUCOPHAGE-XR) 500 mg 24 hr tablet Take 2 tablets (1,000 mg total) by mouth 2 (two) times a day with meals 360 tablet 1    Milk Thistle 200 MG CAPS Take 2 capsules by mouth daily      Multiple Vitamins-Minerals (PRESERVISION AREDS PO) Take 1 capsule by mouth      TiZANidine (ZANAFLEX) 4 MG capsule Take 1 tab BID prn muscle spasms 60 capsule 5    diclofenac sodium (VOLTAREN) 1 % Apply 2 g topically 4 (four) times a day 100 g 5    Empagliflozin 25 MG TABS Take 1 tablet (25 mg total) by mouth every morning For diabetes (Patient not taking: Reported on 8/6/2020) 90 tablet 1    gabapentin (NEURONTIN) 300 mg capsule Take 1 capsule (300 mg total) by mouth 3 (three) times a day ] (Patient not taking: Reported on 8/6/2020) 90 capsule 5    Omega-3 Fatty Acids (PX SUPER EPA FISH OIL PO) Take 1 capsule by mouth daily      senna (SENOKOT) 8 6 MG tablet Take 1 tablet by mouth daily       No current facility-administered medications for this visit        Current Outpatient Medications on File Prior to Visit   Medication Sig    beta carotene 14566 UNIT capsule Take 25,000 Units by mouth daily    Coenzyme Q10 (CO Q 10) 100 MG CAPS Take 1 capsule by mouth daily    diclofenac (VOLTAREN) 75 mg EC tablet Take 1 tablet (75 mg total) by mouth 2 (two) times a day Take with food    DULoxetine (CYMBALTA) 30 mg delayed release capsule Take 1 capsule (30 mg total) by mouth daily    ferrous sulfate 325 (65 Fe) mg tablet Take 325 mg by mouth daily with breakfast    glucose blood (OneTouch Verio) test strip Use as instructed    Lancets (ONETOUCH DELICA PLUS [id number]) MISC 1 each by Does not apply route daily    metFORMIN (GLUCOPHAGE-XR) 500 mg 24 hr tablet Take 2 tablets (1,000 mg total) by mouth 2 (two) times a day with meals    Milk Thistle 200 MG CAPS Take 2 capsules by mouth daily    Multiple Vitamins-Minerals (PRESERVISION AREDS PO) Take 1 capsule by mouth    TiZANidine (ZANAFLEX) 4 MG capsule Take 1 tab BID prn muscle spasms    diclofenac sodium (VOLTAREN) 1 % Apply 2 g topically 4 (four) times a day    Empagliflozin 25 MG TABS Take 1 tablet (25 mg total) by mouth every morning For diabetes (Patient not taking: Reported on 8/6/2020)    gabapentin (NEURONTIN) 300 mg capsule Take 1 capsule (300 mg total) by mouth 3 (three) times a day ] (Patient not taking: Reported on 8/6/2020)    Omega-3 Fatty Acids (PX SUPER EPA FISH OIL PO) Take 1 capsule by mouth daily    senna (SENOKOT) 8 6 MG tablet Take 1 tablet by mouth daily     No current facility-administered medications on file prior to visit  She has No Known Allergies       Review of Systems   Constitutional: Positive for activity change (difficulty bending and lifting and being active due to pain)  HENT: Negative  Negative for trouble swallowing  Eyes: Negative  Respiratory: Negative  Cardiovascular: Negative  Gastrointestinal: Positive for constipation  Endocrine: Negative  Genitourinary: Negative  Musculoskeletal: Positive for arthralgias, back pain (5 year back pain mainly in Right buttock, hip, and thigh pain  ) and myalgias (muscle cramps)  Takes tizanidine,  diclofenac for pain  Cymbalta to replace gabapentin    Tried AVTAR, uses aqua therapy at home in pool   Skin: Negative  Allergic/Immunologic: Negative  Neurological: Positive for weakness (b/l legs) and numbness (right knee,  shin into foot)  Hematological: Does not bruise/bleed easily  Psychiatric/Behavioral: Negative for sleep disturbance  I have personally reviewed all aspects of the review of systems as documented    Objective:      /86 (BP Location: Left arm)   Pulse 85   Temp 97 6 °F (36 4 °C) (Tympanic)   Resp 16   Ht 5' 8" (1 727 m)   Wt 106 kg (234 lb)   BMI 35 58 kg/m²          Physical Exam   Constitutional: She is oriented to person, place, and time  No distress  HENT:   Head: Normocephalic and atraumatic  Eyes: Pupils are equal, round, and reactive to light  Conjunctivae are normal  Right eye exhibits no discharge  Left eye exhibits no discharge  No scleral icterus  Neck: Normal range of motion     Cardiovascular: Normal rate    Pulmonary/Chest: Effort normal  No respiratory distress  She has no wheezes  Abdominal: Normal appearance  Musculoskeletal: Normal range of motion  Neurological: She is alert and oriented to person, place, and time  She has normal motor skills, normal sensation and intact cranial nerves  Skin: Skin is warm and dry     Psychiatric: Mood and thought content normal  room air

## 2020-09-08 ENCOUNTER — DOCUMENTATION (OUTPATIENT)
Dept: INTERNAL MEDICINE CLINIC | Facility: CLINIC | Age: 55
End: 2020-09-08

## 2020-09-08 DIAGNOSIS — M79.18 MYOFASCIAL PAIN SYNDROME: ICD-10-CM

## 2020-09-08 RX ORDER — DULOXETIN HYDROCHLORIDE 60 MG/1
60 CAPSULE, DELAYED RELEASE ORAL DAILY
Qty: 90 CAPSULE | Refills: 1 | Status: CANCELLED | OUTPATIENT
Start: 2020-09-08

## 2020-09-08 NOTE — TELEPHONE ENCOUNTER
Feels as if duloxetine has improved pain but feels like rx wears off after ~6-8 hours; at this time pain becomes intolerable  Has ~10 days remaining of current supply  No longer taking gabapentin  Patient could benefit from duloxetine dose increase as she is not on target dose  · As rx was last from pain clinic, will alert pain clinic to consider increasing duloxetine dose to 60mg/day  Please advise - pharmacist can f/u with patient if needed

## 2020-09-08 NOTE — PROGRESS NOTES
Yasmany, PharmD, Smita Musa    The following is per review of patient's pertinent medical/medication history and phone call with patient:    Reason for documentation: Call received from patient  Subjective/Objective:    Currently weighs 212 pounds  Continues to reduce CHO throughout the day  Feels as if duloxetine has improved pain but feels like rx wears off after ~6-8 hours; at this time pain becomes intolerable  Has ~10 days remaining of current supply  No longer taking gabapentin  Assessment/Plan:  Pain: patient could benefit from duloxetine dose increase as she is not on target dose  · As rx was last from pain clinic, will alert pain clinic to consider increasing duloxetine dose to 60mg/day  Demographics  Interaction Method: Phone  Type of Intervention: Follow-Up    Topic(s) Addressed  Medication Management    Intervention(s) Made    Pharmacologic:      Medication Adjustment - Dose or Frequency: duloxetine    Tool(s) Used  Not Applicable    Time Spent:   Time Spent in Direct Patient Care: 20 minutes    Time Spent in Care Coordination: 10 minutes    Recommendation(s) Accepted by the Patient/Caregiver:  All Accepted

## 2020-09-09 ENCOUNTER — TELEPHONE (OUTPATIENT)
Dept: PAIN MEDICINE | Facility: CLINIC | Age: 55
End: 2020-09-09

## 2020-09-09 DIAGNOSIS — M79.18 MYOFASCIAL PAIN SYNDROME: ICD-10-CM

## 2020-09-09 RX ORDER — DULOXETIN HYDROCHLORIDE 60 MG/1
60 CAPSULE, DELAYED RELEASE ORAL DAILY
Qty: 30 CAPSULE | Refills: 5 | Status: SHIPPED | OUTPATIENT
Start: 2020-09-09 | End: 2020-10-05 | Stop reason: SDUPTHER

## 2020-09-09 NOTE — TELEPHONE ENCOUNTER
I s/w pt and she confirmed she is taking the cymbalta and it helps but it wears off quickly  The spasms return within 6 hrs of taking it and she would have to then take tizanidine too to help, but that would make her sleepy  Pt would be interested in having the cymbalta increased  She said she did have some nausea and drowsiness when she first started the cymbalta but that has subsided  Pt uses [facility] on file  Pt told that I will forward update back to NM and someone will call her once we hear back from NM  Pt aware NM not in office until Thurs

## 2020-09-09 NOTE — TELEPHONE ENCOUNTER
Patient called returning the nurses call  Advised pt of the prior note below     Please be advise thank you

## 2020-09-09 NOTE — TELEPHONE ENCOUNTER
I called pt and left detailed message that NM sent new script for Cymbalta and inc it to 60 mg once daily  Pt to c/b in 2 wks with update on how it tis working or sooner with s/e     This was already addressed in another task under Rx Request

## 2020-09-09 NOTE — TELEPHONE ENCOUNTER
Patient thinks she missed another call from around 2:30pm she wasn't sure who it was or what it was regarding since she already spoke with a nurse earlier today  Please advise,     Thank you       [phone]

## 2020-09-09 NOTE — TELEPHONE ENCOUNTER
I left detailed vm for pt (ok per ANNETTE) that Marky Beaver did send a new script for the cymbalta and she inc it to 60 mg once daily  Pt to c/b in 2 wks with update on how it is working or sooner with any adverse s/e  No need for pt to c/b unless she has questions

## 2020-09-19 ENCOUNTER — APPOINTMENT (OUTPATIENT)
Dept: LAB | Age: 55
End: 2020-09-19
Payer: COMMERCIAL

## 2020-09-19 DIAGNOSIS — D64.9 ANEMIA, UNSPECIFIED TYPE: ICD-10-CM

## 2020-09-19 DIAGNOSIS — E11.9 TYPE 2 DIABETES MELLITUS WITHOUT COMPLICATION, WITHOUT LONG-TERM CURRENT USE OF INSULIN (HCC): ICD-10-CM

## 2020-09-19 LAB
ALBUMIN SERPL BCP-MCNC: 3.6 G/DL (ref 3.5–5)
ALP SERPL-CCNC: 75 U/L (ref 46–116)
ALT SERPL W P-5'-P-CCNC: 40 U/L (ref 12–78)
ANION GAP SERPL CALCULATED.3IONS-SCNC: 4 MMOL/L (ref 4–13)
AST SERPL W P-5'-P-CCNC: 21 U/L (ref 5–45)
BILIRUB SERPL-MCNC: 0.38 MG/DL (ref 0.2–1)
BUN SERPL-MCNC: 13 MG/DL (ref 5–25)
CALCIUM SERPL-MCNC: 8.6 MG/DL (ref 8.3–10.1)
CHLORIDE SERPL-SCNC: 106 MMOL/L (ref 100–108)
CHOLEST SERPL-MCNC: 166 MG/DL (ref 50–200)
CO2 SERPL-SCNC: 28 MMOL/L (ref 21–32)
CREAT SERPL-MCNC: 0.55 MG/DL (ref 0.6–1.3)
EST. AVERAGE GLUCOSE BLD GHB EST-MCNC: 143 MG/DL
FERRITIN SERPL-MCNC: 15 NG/ML (ref 8–388)
GFR SERPL CREATININE-BSD FRML MDRD: 107 ML/MIN/1.73SQ M
GLUCOSE P FAST SERPL-MCNC: 140 MG/DL (ref 65–99)
HBA1C MFR BLD: 6.6 %
HDLC SERPL-MCNC: 36 MG/DL
IRON SERPL-MCNC: 55 UG/DL (ref 50–170)
LDLC SERPL CALC-MCNC: 106 MG/DL (ref 0–100)
POTASSIUM SERPL-SCNC: 3.8 MMOL/L (ref 3.5–5.3)
PROT SERPL-MCNC: 7.5 G/DL (ref 6.4–8.2)
SODIUM SERPL-SCNC: 138 MMOL/L (ref 136–145)
TRIGL SERPL-MCNC: 118 MG/DL

## 2020-09-19 PROCEDURE — 82728 ASSAY OF FERRITIN: CPT

## 2020-09-19 PROCEDURE — 3044F HG A1C LEVEL LT 7.0%: CPT | Performed by: NURSE PRACTITIONER

## 2020-09-19 PROCEDURE — 36415 COLL VENOUS BLD VENIPUNCTURE: CPT

## 2020-09-19 PROCEDURE — 83036 HEMOGLOBIN GLYCOSYLATED A1C: CPT

## 2020-09-19 PROCEDURE — 83540 ASSAY OF IRON: CPT

## 2020-09-19 PROCEDURE — 80061 LIPID PANEL: CPT

## 2020-09-19 PROCEDURE — 80053 COMPREHEN METABOLIC PANEL: CPT

## 2020-09-21 NOTE — TELEPHONE ENCOUNTER
No cymbalta is once a day medication  I would like to wait another week or 2, as it was only increased on 9/9, and can take 3-4 weeks to notice effectiveness    If no relief in the next week or 2, call back and I will increase the dose further

## 2020-09-21 NOTE — TELEPHONE ENCOUNTER
I informed pt of Henna's recommendations, pt understands to c/b in 1-2 weeks if not further improvement and then NM will increase the cymbalta dose further  Pt wanted to make NM aware that her recent HgbA1C is now down to 6 6, she believes NM was waiting for the A1C to be lower in regards to doing an injection

## 2020-09-21 NOTE — TELEPHONE ENCOUNTER
Pt is calling stating she is in a lot of pain and doesn't know what to do    Medication is not working and she states she is unable to sleep    Please advise  Pt can be reached at [phone]

## 2020-09-21 NOTE — TELEPHONE ENCOUNTER
I s/w pt and she said the Cymbalta 60 mg once a day is working but only lasts for 10-12 hrs  She takes it at 6 AM daily  She is ok with then taking a tizanidine at bedtime and that helps but if she takes a tizanidine during the day it's too strong and makes her too sleepy  She has her granddaughter during the day M-F and has to help her with the online schooling and has to be awake  Pt asking if she can take the Cymbalta 60 mg twice a day? Told pt I would send message to NM for rec of how to proceed for better pain coverage for the entire day

## 2020-09-22 DIAGNOSIS — D64.9 MILD ANEMIA: Primary | ICD-10-CM

## 2020-09-26 DIAGNOSIS — M47.816 LUMBAR SPONDYLOSIS: ICD-10-CM

## 2020-09-26 DIAGNOSIS — E11.65 TYPE 2 DIABETES MELLITUS WITH HYPERGLYCEMIA, WITHOUT LONG-TERM CURRENT USE OF INSULIN (HCC): ICD-10-CM

## 2020-09-28 ENCOUNTER — TELEMEDICINE (OUTPATIENT)
Dept: INTERNAL MEDICINE CLINIC | Facility: CLINIC | Age: 55
End: 2020-09-28
Payer: COMMERCIAL

## 2020-09-28 DIAGNOSIS — D64.9 ANEMIA, UNSPECIFIED TYPE: ICD-10-CM

## 2020-09-28 DIAGNOSIS — E66.01 CLASS 2 SEVERE OBESITY DUE TO EXCESS CALORIES WITH SERIOUS COMORBIDITY AND BODY MASS INDEX (BMI) OF 35.0 TO 35.9 IN ADULT (HCC): ICD-10-CM

## 2020-09-28 DIAGNOSIS — D64.9 MILD ANEMIA: ICD-10-CM

## 2020-09-28 DIAGNOSIS — M47.816 LUMBAR SPONDYLOSIS: ICD-10-CM

## 2020-09-28 DIAGNOSIS — E11.65 TYPE 2 DIABETES MELLITUS WITH HYPERGLYCEMIA, WITHOUT LONG-TERM CURRENT USE OF INSULIN (HCC): Primary | ICD-10-CM

## 2020-09-28 DIAGNOSIS — G62.9 NEUROPATHY: ICD-10-CM

## 2020-09-28 PROCEDURE — 99214 OFFICE O/P EST MOD 30 MIN: CPT | Performed by: INTERNAL MEDICINE

## 2020-09-28 RX ORDER — DICLOFENAC SODIUM 75 MG/1
75 TABLET, DELAYED RELEASE ORAL 2 TIMES DAILY
Qty: 60 TABLET | Refills: 0 | OUTPATIENT
Start: 2020-09-28

## 2020-09-28 RX ORDER — DICLOFENAC SODIUM 75 MG/1
75 TABLET, DELAYED RELEASE ORAL 2 TIMES DAILY
Qty: 180 TABLET | Refills: 5 | Status: SHIPPED | OUTPATIENT
Start: 2020-09-28 | End: 2020-10-05 | Stop reason: SDUPTHER

## 2020-09-28 RX ORDER — EVENING PRIMROSE OIL 500 MG
1 CAPSULE ORAL DAILY
Refills: 0
Start: 2020-09-28 | End: 2021-10-12 | Stop reason: ALTCHOICE

## 2020-09-28 RX ORDER — UREA 10 %
800 LOTION (ML) TOPICAL DAILY
Start: 2020-09-28

## 2020-09-28 NOTE — PROGRESS NOTES
Virtual Regular Visit      Assessment/Plan:    Problem List Items Addressed This Visit        Endocrine    Type 2 diabetes mellitus with hyperglycemia, without long-term current use of insulin ([facility] [address] ) - Primary       Lab Results   Component Value Date    HGBA1C 6 6 (H) 09/19/2020   Excellent controlled tolerating the Jardiance 25 mg once daily continue to maintain healthy/balance diet diabetic labs in 6 months         Relevant Orders    Comprehensive metabolic panel    Hemoglobin A1C    Lipid Panel with Direct LDL reflex    Microalbumin / creatinine urine ratio       Nervous and Auditory    Neuropathy     Clinically stable and doing well continue the current medical regiment will continue monitor  Relevant Medications    Evening Primrose Oil 500 MG CAPS       Other    Class 2 severe obesity due to excess calories with serious comorbidity and body mass index (BMI) of 35 0 to 35 9 in adult [facility])     Improving continue healthy/balance diet will continue monitor  Anemia     Secondary to gastric bypass mild she has added folic acid will continue monitor CBC, ferritin and iron levels she will continue with the ferrous sulfate 325 mg once daily         Relevant Medications    folic acid (FOLVITE) 451 MCG tablet    Other Relevant Orders    CBC (Includes Diff/Plt) (Refl)    Ferritin    Mild anemia    Relevant Medications    folic acid (FOLVITE) 171 MCG tablet        RTO in 1 months for her Medicare wellness as already scheduled call if any problems  BMI Counseling: There is no height or weight on file to calculate BMI  The BMI is above normal  Nutrition recommendations include decreasing portion sizes and moderation in carbohydrate intake  Exercise recommendations include exercising 3-5 times per week             Reason for visit is   Chief Complaint   Patient presents with    Virtual Regular Visit        Encounter provider Reina Escobar DO    Provider located at [facility]  [id number] [place] [id number]      Recent Visits  No visits were found meeting these conditions  Showing recent visits within past 7 days and meeting all other requirements     Today's Visits  Date Type Provider Dept   09/28/20 Telemedicine Collin Bronson today's visits and meeting all other requirements     Future Appointments  No visits were found meeting these conditions  Showing future appointments within next 150 days and meeting all other requirements        The patient was identified by name and date of birth  Elio Lopez was informed that this is a telemedicine visit and that the visit is being conducted through [facility] and patient was informed that this is a secure, HIPAA-compliant platform  She agrees to proceed     My office door was closed  No one else was in the room  She acknowledged consent and understanding of privacy and security of the video platform  The patient has agreed to participate and understands they can discontinue the visit at any time  Patient is aware this is a billable service  Subjective  Elio Lopez is a 48 y o  female follow-up examination  HPI 56-year old female coming in for a follow up office visit regarding type 2 diabetes, mild anemia, neuropathy, obesity; The patient reports me compliant taking medications without untoward side effects the  The patient is here to review his medical condition, update me on the medical condition and the patient reports me no hospitalizations and no ER visits  weight 210, labs ; she still has nerve pain numbing right knee and on cymbalta and working with pain management   Also the she is using stretching band and tire for exercize   She reports me adding Primrose and also folic acid to her regimen tolerating well  She primarily is taking the medication for neuropathy and anemia    She is doing well with her diet she has lost weight she is working with pain management she has seen Neurosurgery she has decided to hold off on surgical intervention at this point time period  Past Medical History:   Diagnosis Date    Non-toxic multinodular goiter        Past Surgical History:   Procedure Laterality Date    STOMACH SURGERY      for morbid obesity / last assessed 9/10/12    US GUIDED THYROID BIOPSY      biopsy thyroid using percutaneous core needle     US GUIDED THYROID BIOPSY  7/1/2020       Current Outpatient Medications   Medication Sig Dispense Refill    beta carotene 61787 UNIT capsule Take 25,000 Units by mouth daily      Coenzyme Q10 (CO Q 10) 100 MG CAPS Take 1 capsule by mouth daily      diclofenac (VOLTAREN) 75 mg EC tablet Take 1 tablet (75 mg total) by mouth 2 (two) times a day Take with food 180 tablet 5    diclofenac sodium (VOLTAREN) 1 % Apply 2 g topically 4 (four) times a day 100 g 5    DULoxetine (CYMBALTA) 60 mg delayed release capsule Take 1 capsule (60 mg total) by mouth daily 30 capsule 5    Empagliflozin 25 MG TABS Take 1 tablet (25 mg total) by mouth every morning For diabetes 90 tablet 0    Evening Primrose Oil 500 MG CAPS Take 1 capsule (500 mg total) by mouth daily  0    ferrous sulfate 325 (65 Fe) mg tablet Take 325 mg by mouth daily with breakfast      folic acid (FOLVITE) 591 MCG tablet Take 1 tablet (800 mcg total) by mouth daily      glucose blood (OneTouch Verio) test strip Use as instructed 200 each 1    Lancets (ONETOUCH DELICA PLUS [id number]) MISC 1 each by Does not apply route daily 100 each 1    metFORMIN (GLUCOPHAGE-XR) 500 mg 24 hr tablet Take 2 tablets (1,000 mg total) by mouth 2 (two) times a day with meals 360 tablet 1    Milk Thistle 200 MG CAPS Take 2 capsules by mouth daily      Omega-3 Fatty Acids (PX SUPER EPA FISH OIL PO) Take 1 capsule by mouth daily      senna (SENOKOT) 8 6 MG tablet Take 1 tablet by mouth daily      TiZANidine (ZANAFLEX) 4 MG capsule Take 1 tab BID prn muscle spasms 60 capsule 5     No current facility-administered medications for this visit  No Known Allergies    Review of Systems   Constitutional: Negative for activity change, appetite change and unexpected weight change  HENT: Negative for congestion and postnasal drip  Respiratory: Negative for cough and shortness of breath  Cardiovascular: Negative for chest pain  Gastrointestinal: Negative for abdominal pain, diarrhea, nausea and vomiting  Video Exam    There were no vitals filed for this visit  Physical Exam     I spent 25 minutes directly with the patient during this visit      VIRTUAL VISIT DISCLAIMER    Tori Henley acknowledges that she has consented to an online visit or consultation  She understands that the online visit is based solely on information provided by her, and that, in the absence of a face-to-face physical evaluation by the physician, the diagnosis she receives is both limited and provisional in terms of accuracy and completeness  This is not intended to replace a full medical face-to-face evaluation by the physician  Tori Henley understands and accepts these terms

## 2020-09-28 NOTE — ASSESSMENT & PLAN NOTE
Secondary to gastric bypass mild she has added folic acid will continue monitor CBC, ferritin and iron levels she will continue with the ferrous sulfate 325 mg once daily

## 2020-10-05 ENCOUNTER — OFFICE VISIT (OUTPATIENT)
Dept: PAIN MEDICINE | Facility: CLINIC | Age: 55
End: 2020-10-05
Payer: COMMERCIAL

## 2020-10-05 VITALS
DIASTOLIC BLOOD PRESSURE: 78 MMHG | RESPIRATION RATE: 16 BRPM | SYSTOLIC BLOOD PRESSURE: 140 MMHG | WEIGHT: 211 LBS | HEART RATE: 82 BPM | BODY MASS INDEX: 31.98 KG/M2 | TEMPERATURE: 98.2 F | HEIGHT: 68 IN

## 2020-10-05 DIAGNOSIS — F41.9 ANXIETY: Primary | ICD-10-CM

## 2020-10-05 DIAGNOSIS — G89.4 CHRONIC PAIN SYNDROME: ICD-10-CM

## 2020-10-05 DIAGNOSIS — M51.16 INTERVERTEBRAL DISC DISORDER WITH RADICULOPATHY OF LUMBAR REGION: ICD-10-CM

## 2020-10-05 DIAGNOSIS — M54.50 CHRONIC BILATERAL LOW BACK PAIN, UNSPECIFIED WHETHER SCIATICA PRESENT: ICD-10-CM

## 2020-10-05 DIAGNOSIS — M47.816 LUMBAR SPONDYLOSIS: ICD-10-CM

## 2020-10-05 DIAGNOSIS — G89.29 CHRONIC BILATERAL LOW BACK PAIN, UNSPECIFIED WHETHER SCIATICA PRESENT: ICD-10-CM

## 2020-10-05 DIAGNOSIS — M54.16 LUMBAR RADICULOPATHY: ICD-10-CM

## 2020-10-05 DIAGNOSIS — M79.18 MYOFASCIAL PAIN SYNDROME: ICD-10-CM

## 2020-10-05 DIAGNOSIS — M43.16 SPONDYLOLISTHESIS AT L3-L4 LEVEL: ICD-10-CM

## 2020-10-05 DIAGNOSIS — M46.1 SACROILIITIS (HCC): ICD-10-CM

## 2020-10-05 PROCEDURE — 99214 OFFICE O/P EST MOD 30 MIN: CPT | Performed by: NURSE PRACTITIONER

## 2020-10-05 RX ORDER — LORAZEPAM 0.5 MG/1
TABLET ORAL
Qty: 2 TABLET | Refills: 0 | Status: SHIPPED | OUTPATIENT
Start: 2020-10-05 | End: 2021-10-11 | Stop reason: SDUPTHER

## 2020-10-05 RX ORDER — DULOXETIN HYDROCHLORIDE 20 MG/1
CAPSULE, DELAYED RELEASE ORAL
Qty: 30 CAPSULE | Refills: 5 | Status: SHIPPED | OUTPATIENT
Start: 2020-10-05 | End: 2021-08-31 | Stop reason: SDUPTHER

## 2020-10-05 RX ORDER — DULOXETIN HYDROCHLORIDE 60 MG/1
60 CAPSULE, DELAYED RELEASE ORAL DAILY
Qty: 30 CAPSULE | Refills: 5 | Status: SHIPPED | OUTPATIENT
Start: 2020-10-05 | End: 2021-08-31 | Stop reason: SDUPTHER

## 2020-10-05 RX ORDER — TIZANIDINE HYDROCHLORIDE 4 MG/1
CAPSULE, GELATIN COATED ORAL
Qty: 60 CAPSULE | Refills: 5 | Status: SHIPPED | OUTPATIENT
Start: 2020-10-05 | End: 2021-12-09

## 2020-10-05 RX ORDER — DICLOFENAC SODIUM 75 MG/1
75 TABLET, DELAYED RELEASE ORAL 2 TIMES DAILY
Qty: 180 TABLET | Refills: 5 | Status: SHIPPED | OUTPATIENT
Start: 2020-10-05 | End: 2022-01-12 | Stop reason: SDUPTHER

## 2020-10-06 ENCOUNTER — TRANSCRIBE ORDERS (OUTPATIENT)
Dept: RADIOLOGY | Facility: CLINIC | Age: 55
End: 2020-10-06

## 2020-10-06 ENCOUNTER — TELEPHONE (OUTPATIENT)
Dept: RADIOLOGY | Facility: CLINIC | Age: 55
End: 2020-10-06

## 2020-10-06 ENCOUNTER — TRANSCRIBE ORDERS (OUTPATIENT)
Dept: PAIN MEDICINE | Facility: CLINIC | Age: 55
End: 2020-10-06

## 2020-10-15 ENCOUNTER — TELEMEDICINE (OUTPATIENT)
Dept: INTERNAL MEDICINE CLINIC | Facility: CLINIC | Age: 55
End: 2020-10-15
Payer: COMMERCIAL

## 2020-10-15 DIAGNOSIS — N30.00 ACUTE CYSTITIS WITHOUT HEMATURIA: Primary | ICD-10-CM

## 2020-10-15 PROCEDURE — 1036F TOBACCO NON-USER: CPT | Performed by: NURSE PRACTITIONER

## 2020-10-15 PROCEDURE — 99214 OFFICE O/P EST MOD 30 MIN: CPT | Performed by: NURSE PRACTITIONER

## 2020-10-15 RX ORDER — SULFAMETHOXAZOLE AND TRIMETHOPRIM 800; 160 MG/1; MG/1
1 TABLET ORAL EVERY 12 HOURS SCHEDULED
Qty: 14 TABLET | Refills: 0 | Status: SHIPPED | OUTPATIENT
Start: 2020-10-15 | End: 2020-10-22 | Stop reason: ALTCHOICE

## 2020-10-19 PROBLEM — N30.00 ACUTE CYSTITIS WITHOUT HEMATURIA: Status: ACTIVE | Noted: 2020-10-19

## 2020-10-22 ENCOUNTER — HOSPITAL ENCOUNTER (OUTPATIENT)
Dept: RADIOLOGY | Facility: CLINIC | Age: 55
Discharge: HOME | End: 2020-10-22
Attending: ANESTHESIOLOGY | Admitting: ANESTHESIOLOGY
Payer: COMMERCIAL

## 2020-10-22 VITALS
RESPIRATION RATE: 20 BRPM | HEART RATE: 90 BPM | SYSTOLIC BLOOD PRESSURE: 121 MMHG | DIASTOLIC BLOOD PRESSURE: 78 MMHG | OXYGEN SATURATION: 97 % | TEMPERATURE: 97.3 F

## 2020-10-22 DIAGNOSIS — M54.16 LUMBAR RADICULOPATHY: ICD-10-CM

## 2020-10-22 DIAGNOSIS — M51.16 INTERVERTEBRAL DISC DISORDER WITH RADICULOPATHY OF LUMBAR REGION: ICD-10-CM

## 2020-10-22 PROCEDURE — 64483 NJX AA&/STRD TFRM EPI L/S 1: CPT | Performed by: ANESTHESIOLOGY

## 2020-10-22 RX ORDER — BUPIVACAINE HCL/PF 2.5 MG/ML
10 VIAL (ML) INJECTION ONCE
Status: COMPLETED | OUTPATIENT
Start: 2020-10-22 | End: 2020-10-22

## 2020-10-22 RX ORDER — METHYLPREDNISOLONE ACETATE 80 MG/ML
80 INJECTION, SUSPENSION INTRA-ARTICULAR; INTRALESIONAL; INTRAMUSCULAR; PARENTERAL; SOFT TISSUE ONCE
Status: COMPLETED | OUTPATIENT
Start: 2020-10-22 | End: 2020-10-22

## 2020-10-22 RX ORDER — 0.9 % SODIUM CHLORIDE 0.9 %
10 VIAL (ML) INJECTION ONCE
Status: COMPLETED | OUTPATIENT
Start: 2020-10-22 | End: 2020-10-22

## 2020-10-22 RX ADMIN — IOHEXOL 1 ML: 300 INJECTION, SOLUTION INTRAVENOUS at 08:45

## 2020-10-22 RX ADMIN — BUPIVACAINE HYDROCHLORIDE 2 ML: 2.5 INJECTION, SOLUTION EPIDURAL; INFILTRATION; INTRACAUDAL at 08:46

## 2020-10-22 RX ADMIN — METHYLPREDNISOLONE ACETATE 80 MG: 80 INJECTION, SUSPENSION INTRA-ARTICULAR; INTRALESIONAL; INTRAMUSCULAR; PARENTERAL; SOFT TISSUE at 08:46

## 2020-10-22 RX ADMIN — SODIUM CHLORIDE 5 ML: 9 INJECTION, SOLUTION INTRAMUSCULAR; INTRAVENOUS; SUBCUTANEOUS at 08:44

## 2020-10-22 RX ADMIN — Medication 5 ML: at 08:44

## 2020-10-29 ENCOUNTER — TELEPHONE (OUTPATIENT)
Dept: PAIN MEDICINE | Facility: CLINIC | Age: 55
End: 2020-10-29

## 2020-11-17 ENCOUNTER — VBI (OUTPATIENT)
Dept: ADMINISTRATIVE | Facility: OTHER | Age: 55
End: 2020-11-17

## 2020-12-02 ENCOUNTER — VBI (OUTPATIENT)
Dept: ADMINISTRATIVE | Facility: OTHER | Age: 55
End: 2020-12-02

## 2021-01-12 ENCOUNTER — VBI (OUTPATIENT)
Dept: ADMINISTRATIVE | Facility: OTHER | Age: 56
End: 2021-01-12

## 2021-01-19 DIAGNOSIS — E11.65 TYPE 2 DIABETES MELLITUS WITH HYPERGLYCEMIA, WITHOUT LONG-TERM CURRENT USE OF INSULIN (HCC): ICD-10-CM

## 2021-01-19 RX ORDER — EMPAGLIFLOZIN 25 MG/1
25 TABLET, FILM COATED ORAL EVERY MORNING
Qty: 90 TABLET | Refills: 1 | Status: SHIPPED | OUTPATIENT
Start: 2021-01-19 | End: 2021-06-18

## 2021-01-19 RX ORDER — METFORMIN HYDROCHLORIDE 500 MG/1
1000 TABLET, EXTENDED RELEASE ORAL 2 TIMES DAILY
Qty: 360 TABLET | Refills: 1 | Status: SHIPPED | OUTPATIENT
Start: 2021-01-19

## 2021-01-19 NOTE — TELEPHONE ENCOUNTER
VM received from patient inquiring about 90-day supply rx's for metformin, empagliflozin and duloxetine  Will pend rx's for PCP signature/concurrence       Will inform patient to contact pain clinic for duloxetine renewal

## 2021-03-11 ENCOUNTER — VBI (OUTPATIENT)
Dept: ADMINISTRATIVE | Facility: OTHER | Age: 56
End: 2021-03-11

## 2021-04-28 ENCOUNTER — VBI (OUTPATIENT)
Dept: ADMINISTRATIVE | Facility: OTHER | Age: 56
End: 2021-04-28

## 2021-05-11 ENCOUNTER — VBI (OUTPATIENT)
Dept: ADMINISTRATIVE | Facility: OTHER | Age: 56
End: 2021-05-11

## 2021-05-20 NOTE — ASSESSMENT & PLAN NOTE
Lab Results   Component Value Date    HGBA1C 6 6 (H) 09/19/2020   Excellent controlled tolerating the Jardiance 25 mg once daily continue to maintain healthy/balance diet diabetic labs in 6 months See TE 4/23/21

## 2021-06-15 ENCOUNTER — VBI (OUTPATIENT)
Dept: ADMINISTRATIVE | Facility: OTHER | Age: 56
End: 2021-06-15

## 2021-06-18 DIAGNOSIS — E11.65 TYPE 2 DIABETES MELLITUS WITH HYPERGLYCEMIA, WITHOUT LONG-TERM CURRENT USE OF INSULIN (HCC): ICD-10-CM

## 2021-06-18 RX ORDER — EMPAGLIFLOZIN 25 MG/1
25 TABLET, FILM COATED ORAL EVERY MORNING
Qty: 90 TABLET | Refills: 0 | Status: SHIPPED | OUTPATIENT
Start: 2021-06-18 | End: 2022-01-12 | Stop reason: SDUPTHER

## 2021-07-22 ENCOUNTER — VBI (OUTPATIENT)
Dept: ADMINISTRATIVE | Facility: OTHER | Age: 56
End: 2021-07-22

## 2021-08-17 ENCOUNTER — VBI (OUTPATIENT)
Dept: ADMINISTRATIVE | Facility: OTHER | Age: 56
End: 2021-08-17

## 2021-08-31 ENCOUNTER — APPOINTMENT (OUTPATIENT)
Dept: RADIOLOGY | Age: 56
End: 2021-08-31
Payer: COMMERCIAL

## 2021-08-31 ENCOUNTER — TELEPHONE (OUTPATIENT)
Dept: PAIN MEDICINE | Facility: CLINIC | Age: 56
End: 2021-08-31

## 2021-08-31 ENCOUNTER — VBI (OUTPATIENT)
Dept: ADMINISTRATIVE | Facility: OTHER | Age: 56
End: 2021-08-31

## 2021-08-31 ENCOUNTER — OFFICE VISIT (OUTPATIENT)
Dept: URGENT CARE | Age: 56
End: 2021-08-31
Payer: COMMERCIAL

## 2021-08-31 VITALS
BODY MASS INDEX: 31.1 KG/M2 | HEART RATE: 83 BPM | WEIGHT: 210 LBS | RESPIRATION RATE: 20 BRPM | SYSTOLIC BLOOD PRESSURE: 157 MMHG | HEIGHT: 69 IN | DIASTOLIC BLOOD PRESSURE: 72 MMHG | OXYGEN SATURATION: 97 % | TEMPERATURE: 97.3 F

## 2021-08-31 DIAGNOSIS — M54.50 ACUTE BILATERAL LOW BACK PAIN WITHOUT SCIATICA: Primary | ICD-10-CM

## 2021-08-31 DIAGNOSIS — M54.50 ACUTE BILATERAL LOW BACK PAIN WITHOUT SCIATICA: ICD-10-CM

## 2021-08-31 DIAGNOSIS — M79.18 MYOFASCIAL PAIN SYNDROME: ICD-10-CM

## 2021-08-31 PROCEDURE — S9083 URGENT CARE CENTER GLOBAL: HCPCS | Performed by: NURSE PRACTITIONER

## 2021-08-31 PROCEDURE — G0382 LEV 3 HOSP TYPE B ED VISIT: HCPCS | Performed by: NURSE PRACTITIONER

## 2021-08-31 PROCEDURE — 96372 THER/PROPH/DIAG INJ SC/IM: CPT | Performed by: NURSE PRACTITIONER

## 2021-08-31 PROCEDURE — 72100 X-RAY EXAM L-S SPINE 2/3 VWS: CPT

## 2021-08-31 RX ORDER — KETOROLAC TROMETHAMINE 30 MG/ML
30 INJECTION, SOLUTION INTRAMUSCULAR; INTRAVENOUS ONCE
Status: COMPLETED | OUTPATIENT
Start: 2021-08-31 | End: 2021-08-31

## 2021-08-31 RX ORDER — DULOXETIN HYDROCHLORIDE 20 MG/1
CAPSULE, DELAYED RELEASE ORAL
Qty: 30 CAPSULE | Refills: 5 | Status: SHIPPED | OUTPATIENT
Start: 2021-08-31 | End: 2021-12-30 | Stop reason: SDUPTHER

## 2021-08-31 RX ORDER — DULOXETIN HYDROCHLORIDE 60 MG/1
60 CAPSULE, DELAYED RELEASE ORAL DAILY
Qty: 30 CAPSULE | Refills: 5 | Status: SHIPPED | OUTPATIENT
Start: 2021-08-31 | End: 2021-12-30 | Stop reason: SDUPTHER

## 2021-08-31 RX ADMIN — KETOROLAC TROMETHAMINE 30 MG: 30 INJECTION, SOLUTION INTRAMUSCULAR; INTRAVENOUS at 18:19

## 2021-08-31 NOTE — TELEPHONE ENCOUNTER
Patient   [phone]  Huyen Pizanoprabhu  PRANEETH    Patient fell and hurt her back again  She is scheduled to come in on 9/21/21  She is asking for a refill of Cymablta 60 mg   Pt is out of meds     [id number] [address] [place] [address], [place] [address]

## 2021-08-31 NOTE — TELEPHONE ENCOUNTER
S/w pt, she said she fell a few days ago after tripping over something and hurt her back  Pt is going to [facility] to be evaluated and get imaging  Pt is asking for refills of her Duloxetine 20 mg and 60 mg until she can be seen by [facility] [place] operated by [facility] on 9/21  Pt tolerating both doses with no s/e  Pt uses [facility] on file      **Pt does not need a call back once sent

## 2021-08-31 NOTE — PROGRESS NOTES
[address] [employer] [facility]        NAME: Soraya Porras is a 55 y o  female  : 1965    MRN: [id number]  DATE: 2021  TIME: 7:14 PM    Assessment and Plan   Acute bilateral low back pain without sciatica [M54 5]  1  Acute bilateral low back pain without sciatica  XR spine lumbar 2 or 3 views injury    ketorolac (TORADOL) injection 30 mg         Patient Instructions     Cont with previously prescribed tizanidine  Cont with diclofenac  IM toradol administered  Follow up with PCP in 3-5 days  Proceed to  ER if symptoms worsen  Chief Complaint     Chief Complaint   Patient presents with    Hip Injury     PATIENT STATES [address] [place] [address] HURTING BILATERAL HIPS AND LOWER BACK IT HAPPEN 10 DAYS AGO    Back Injury         History of Present Illness       HPI   Reports she was removing some things from her car, while at her daughter's house, and as she was closing the door, accidentally fell  She tripped on a rock  This was 10 days ago  Has been in pain since  Moderate to severe pain, constant, worse with certain movements and bending  Taking OTC med without relief  Has a chronic Hx of low back pain, and degenerative spinal disease and with compression of L4/5  Current pain starts in the lumbar spine and radiates down the B/L hips  No incontinence  Review of Systems   Review of Systems   Constitutional: Negative for chills and fever  Musculoskeletal: Positive for back pain (acute on chronic pain ) and gait problem (d/t low back pain)  Skin: Negative for color change and wound           Current Medications       Current Outpatient Medications:     beta carotene 56628 UNIT capsule, Take 25,000 Units by mouth daily, Disp: , Rfl:     Coenzyme Q10 (CO Q 10) 100 MG CAPS, Take 1 capsule by mouth daily, Disp: , Rfl:     diclofenac (VOLTAREN) 75 mg EC tablet, Take 1 tablet (75 mg total) by mouth 2 (two) times a day Take with food, Disp: 180 tablet, Rfl: 5    diclofenac sodium (VOLTAREN) 1 %, Apply 2 g topically 4 (four) times a day, Disp: 100 g, Rfl: 5    DULoxetine (CYMBALTA) 20 mg capsule, Take 1 tab PO daily with the  60 mg tab of cymbalta, Disp: 30 capsule, Rfl: 5    DULoxetine (CYMBALTA) 60 mg delayed release capsule, Take 1 capsule (60 mg total) by mouth daily, Disp: 30 capsule, Rfl: 5    Evening Primrose Oil 500 MG CAPS, Take 1 capsule (500 mg total) by mouth daily, Disp: , Rfl: 0    ferrous sulfate 325 (65 Fe) mg tablet, Take 325 mg by mouth daily with breakfast, Disp: , Rfl:     folic acid (FOLVITE) 558 MCG tablet, Take 1 tablet (800 mcg total) by mouth daily, Disp:  , Rfl:     glucose blood (OneTouch Verio) test strip, Use as instructed, Disp: 200 each, Rfl: 1    Jardiance 25 MG TABS, TAKE 1 TABLET (25 MG TOTAL) BY MOUTH EVERY MORNING FOR DIABETES, Disp: 90 tablet, Rfl: 0    Lancets (ONETOUCH DELICA PLUS [id number]) MISC, 1 each by Does not apply route daily, Disp: 100 each, Rfl: 1    LORazepam (ATIVAN) 0 5 mg tablet, Take 1 tab 1 hours prior to procedure  Can take additional tab 30 minutes later if still anxious, Disp: 2 tablet, Rfl: 0    metFORMIN (GLUCOPHAGE-XR) 500 mg 24 hr tablet, Take 2 tablets (1,000 mg total) by mouth 2 (two) times a day For diabetes, Disp: 360 tablet, Rfl: 1    Milk Thistle 200 MG CAPS, Take 2 capsules by mouth daily, Disp: , Rfl:     Omega-3 Fatty Acids (PX SUPER EPA FISH OIL PO), Take 1 capsule by mouth daily, Disp: , Rfl:     senna (SENOKOT) 8 6 MG tablet, Take 1 tablet by mouth daily, Disp: , Rfl:     TiZANidine (ZANAFLEX) 4 MG capsule, Take 1 tab BID prn muscle spasms, Disp: 60 capsule, Rfl: 5  No current facility-administered medications for this visit      Current Allergies     Allergies as of 08/31/2021    (No Known Allergies)            The following portions of the patient's history were reviewed and updated as appropriate: allergies, current medications, past family history, past medical history, past social history, past surgical history and problem list      Past Medical History:   Diagnosis Date    Low back pain     Non-toxic multinodular goiter        Past Surgical History:   Procedure Laterality Date    STOMACH SURGERY      for morbid obesity / last assessed 9/10/12    US GUIDED THYROID BIOPSY      biopsy thyroid using percutaneous core needle     US GUIDED THYROID BIOPSY  7/1/2020       History reviewed  No pertinent family history  Medications have been verified  Objective   /72   Pulse 83   Temp (!) 97 3 °F (36 3 °C) (Temporal)   Resp 20   Ht 5' 9" (1 753 m)   Wt 95 3 kg (210 lb)   SpO2 97%   BMI 31 01 kg/m²   No LMP recorded  Physical Exam     Physical Exam  Musculoskeletal:         General: Tenderness (pain with lifting the right and left knees, greater with lifting the left knee) present  No swelling  Comments: Palpation of the lumbar spine and paraspinal muscles  Skin:     Comments: Patches present; she states the are bio-freeze patches  Limited ROM at the waist with flexion, limited to about 15 degrees  Pain with lateral movement, especially with rightward bending   Much more pain with rotational movement at the waist

## 2021-09-01 NOTE — TELEPHONE ENCOUNTER
Patient called in asking for Dr Nery Loving, or [facility] to look over her xrays in chart - pt stated that she was told that she has compression fracture but doesnt trust the findings from [address] wants a call back before getting the back brace as was recommended- thank you     [phone]

## 2021-09-01 NOTE — TELEPHONE ENCOUNTER
Pt informed that [facility] reviewed her imaging and it looks like she has a compression fracture at L2  Pt told that [facility]Q recommends that she should get the back brace  Pt said they didn't have the brace to give her, only gave her a script  Informed pt she would need to take script to a durable medical equipment facility, she should call her [employer] and ask which DME she can go to  Pt asked if FQ wanted her to move up her pending ov at the end of Sept to be seen sooner? Told pt there was no message from him to do that  Pt told to keep her appt as is

## 2021-09-04 ENCOUNTER — OFFICE VISIT (OUTPATIENT)
Dept: OBGYN CLINIC | Facility: HOSPITAL | Age: 56
End: 2021-09-04
Payer: COMMERCIAL

## 2021-09-04 VITALS
WEIGHT: 222.6 LBS | HEIGHT: 69 IN | DIASTOLIC BLOOD PRESSURE: 77 MMHG | SYSTOLIC BLOOD PRESSURE: 116 MMHG | HEART RATE: 97 BPM | BODY MASS INDEX: 32.97 KG/M2

## 2021-09-04 DIAGNOSIS — S32.020A COMPRESSION FRACTURE OF L2 VERTEBRA, INITIAL ENCOUNTER (HCC): ICD-10-CM

## 2021-09-04 DIAGNOSIS — S32.020A CLOSED COMPRESSION FRACTURE OF L2 LUMBAR VERTEBRA, INITIAL ENCOUNTER (HCC): Primary | ICD-10-CM

## 2021-09-04 PROCEDURE — 1036F TOBACCO NON-USER: CPT | Performed by: PHYSICIAN ASSISTANT

## 2021-09-04 PROCEDURE — 99203 OFFICE O/P NEW LOW 30 MIN: CPT | Performed by: PHYSICIAN ASSISTANT

## 2021-09-04 PROCEDURE — 3078F DIAST BP <80 MM HG: CPT | Performed by: PHYSICIAN ASSISTANT

## 2021-09-04 PROCEDURE — 3008F BODY MASS INDEX DOCD: CPT | Performed by: PHYSICIAN ASSISTANT

## 2021-09-04 PROCEDURE — 3074F SYST BP LT 130 MM HG: CPT | Performed by: PHYSICIAN ASSISTANT

## 2021-09-04 RX ORDER — NALOXONE HYDROCHLORIDE 4 MG/.1ML
SPRAY NASAL
Qty: 1 EACH | Refills: 1 | Status: SHIPPED | OUTPATIENT
Start: 2021-09-04 | End: 2021-10-12 | Stop reason: ALTCHOICE

## 2021-09-04 RX ORDER — OXYCODONE HYDROCHLORIDE 5 MG/1
5 TABLET ORAL EVERY 12 HOURS PRN
Qty: 20 TABLET | Refills: 0 | Status: SHIPPED | OUTPATIENT
Start: 2021-09-04 | End: 2021-09-22 | Stop reason: ALTCHOICE

## 2021-09-04 NOTE — PROGRESS NOTES
Assessment:    L2 compression fracture s/p fall  Lumbar DDD, multilevel      Plan:    Obtain MRI lumbar spine without contrast to determine acuity of L2 compression fracture   LSO brace when out of bed   Lumbar spine precautions (limit lifting, twisting, bending)  Rx pain medication short term as needed  Follow-up later this month with Dr Linda Miner for re-evaluation        Problem List Items Addressed This Visit        Musculoskeletal and Integument    Closed compression fracture of L2 lumbar vertebra, initial encounter [facility]) - Primary                   Subjective:     Patient ID:  Trudy Guzmán is a [address] y o  female    HPI    The patient is a 44-year-old female presenting for initial evaluation  She states about 2 to 3 weeks ago she fell onto her back and has had significant increase in localized lumbosacral pain since then  She did speak with Pain Management who recommended brace  She was given appointment here  Today she states the pain is mostly in her upper and lower lumbar region and does not radiate distally to lower extremities  She states she is taking muscle relaxer without much improvement in symptoms  She denies any associated bowel or bladder incontinence, no saddle anesthesia  No history of surgery on her lower back  The following portions of the patient's history were reviewed and updated as appropriate: allergies, current medications, past family history, past medical history, past social history, past surgical history and problem list     Review of Systems   Constitutional: Positive for activity change  Negative for chills, diaphoresis, fatigue and fever  Respiratory: Negative  Cardiovascular: Negative  Gastrointestinal: Negative  Musculoskeletal: Positive for arthralgias and back pain  Negative for gait problem  Skin: Negative  Neurological: Negative for weakness and numbness  All other systems reviewed and are negative         Objective:    Imaging:  Lumbar spine x-rays 8/31/2021  IMPRESSION:     1  L2 compression fracture, new since previous MRI from 2019   2  Severe degenerative disc disease at L3-L4, with 13 mm of anterolisthesis at this level  3   Diffuse bony osteopenia  Vitals:    09/04/21 0953   BP: 116/77   Pulse: 97           Physical Exam  Vitals and nursing note reviewed  Constitutional:       General: She is not in acute distress  Appearance: Normal appearance  She is not ill-appearing, toxic-appearing or diaphoretic  HENT:      Head: Normocephalic and atraumatic  Eyes:      General:         Right eye: No discharge  Left eye: No discharge  Pulmonary:      Effort: Pulmonary effort is normal  No respiratory distress  Musculoskeletal:         General: Tenderness present  Skin:     General: Skin is warm and dry  Neurological:      General: No focal deficit present  Mental Status: She is alert and oriented to person, place, and time  Psychiatric:         Mood and Affect: Mood normal          Behavior: Behavior normal             No acute distress  Gait is without assistance  Inspection open wounds or erythema      Tenderness to palpation over the L2 region and lumbosacral region   Negative modified straight leg raise bilaterally  Motor and sensory stable L2-S1 bilaterally   no calf tenderness, no pitting edema

## 2021-09-07 ENCOUNTER — TELEPHONE (OUTPATIENT)
Dept: INTERNAL MEDICINE CLINIC | Facility: CLINIC | Age: 56
End: 2021-09-07

## 2021-09-07 DIAGNOSIS — E11.65 TYPE 2 DIABETES MELLITUS WITH HYPERGLYCEMIA, WITHOUT LONG-TERM CURRENT USE OF INSULIN (HCC): ICD-10-CM

## 2021-09-07 DIAGNOSIS — S32.000A COMPRESSION FRACTURE OF LUMBAR VERTEBRA, INITIAL ENCOUNTER, UNSPECIFIED LUMBAR VERTEBRAL LEVEL: Primary | ICD-10-CM

## 2021-09-07 NOTE — TELEPHONE ENCOUNTER
Patient called, she fractured her L2 3 weeks ago  Inflamed her back and arthritis in back  Ortho asked her to call PCP to request a bone density test to be done  Please advise

## 2021-09-08 RX ORDER — BLOOD SUGAR DIAGNOSTIC
STRIP MISCELLANEOUS
Qty: 200 STRIP | Refills: 1 | Status: SHIPPED | OUTPATIENT
Start: 2021-09-08

## 2021-09-08 NOTE — TELEPHONE ENCOUNTER
VM received from patient  Reason for VM: would like test strips renewed      Noted patient has medication order pending as it was previously received from pharmacy  Will defer renewal to PCP

## 2021-09-09 ENCOUNTER — TELEPHONE (OUTPATIENT)
Dept: OBGYN CLINIC | Facility: HOSPITAL | Age: 56
End: 2021-09-09

## 2021-09-10 ENCOUNTER — TELEPHONE (OUTPATIENT)
Dept: OBGYN CLINIC | Facility: HOSPITAL | Age: 56
End: 2021-09-10

## 2021-09-16 ENCOUNTER — HOSPITAL ENCOUNTER (OUTPATIENT)
Dept: RADIOLOGY | Facility: IMAGING CENTER | Age: 56
Discharge: HOME | End: 2021-09-16
Payer: COMMERCIAL

## 2021-09-16 DIAGNOSIS — S32.020A CLOSED COMPRESSION FRACTURE OF L2 LUMBAR VERTEBRA, INITIAL ENCOUNTER (HCC): ICD-10-CM

## 2021-09-16 PROCEDURE — 72148 MRI LUMBAR SPINE W/O DYE: CPT

## 2021-09-21 ENCOUNTER — OFFICE VISIT (OUTPATIENT)
Dept: PAIN MEDICINE | Facility: CLINIC | Age: 56
End: 2021-09-21
Payer: COMMERCIAL

## 2021-09-21 VITALS
SYSTOLIC BLOOD PRESSURE: 151 MMHG | WEIGHT: 222 LBS | RESPIRATION RATE: 18 BRPM | BODY MASS INDEX: 32.88 KG/M2 | DIASTOLIC BLOOD PRESSURE: 92 MMHG | HEART RATE: 94 BPM | HEIGHT: 69 IN

## 2021-09-21 DIAGNOSIS — M70.61 TROCHANTERIC BURSITIS OF RIGHT HIP: ICD-10-CM

## 2021-09-21 DIAGNOSIS — M46.1 SACROILIITIS (HCC): ICD-10-CM

## 2021-09-21 DIAGNOSIS — M51.16 INTERVERTEBRAL DISC DISORDER WITH RADICULOPATHY OF LUMBAR REGION: ICD-10-CM

## 2021-09-21 DIAGNOSIS — M54.50 ACUTE EXACERBATION OF CHRONIC LOW BACK PAIN: ICD-10-CM

## 2021-09-21 DIAGNOSIS — G89.29 ACUTE EXACERBATION OF CHRONIC LOW BACK PAIN: ICD-10-CM

## 2021-09-21 DIAGNOSIS — G89.4 CHRONIC PAIN SYNDROME: Primary | ICD-10-CM

## 2021-09-21 DIAGNOSIS — G62.9 NEUROPATHY: ICD-10-CM

## 2021-09-21 DIAGNOSIS — M43.16 SPONDYLOLISTHESIS AT L3-L4 LEVEL: ICD-10-CM

## 2021-09-21 DIAGNOSIS — M47.816 LUMBAR SPONDYLOSIS: ICD-10-CM

## 2021-09-21 PROCEDURE — 99214 OFFICE O/P EST MOD 30 MIN: CPT | Performed by: NURSE PRACTITIONER

## 2021-09-21 RX ORDER — METHYLPREDNISOLONE 4 MG/1
TABLET ORAL
Qty: 1 EACH | Refills: 0 | Status: SHIPPED | OUTPATIENT
Start: 2021-09-21

## 2021-09-21 NOTE — PROGRESS NOTES
Assessment:  1  Chronic pain syndrome    2  Acute exacerbation of chronic low back pain    3  Neuropathy    4  Intervertebral disc disorder with radiculopathy of lumbar region    5  Lumbar spondylosis    6  Spondylolisthesis at L3-L4 level    7  Trochanteric bursitis of right hip    8  Sacroiliitis [facility])        Plan:  Chester Estrada is a 55 y o  female who was last seen 10/22/2020 presents for a follow up office visit in regards to chronic pain syndrome secondary to lumbar radiculopathy, lumbar spondylosis, sacroiliitis, and right trochanteric bursitis  Upon examination, I discussed with the patient she may benefit for LESI at L4-L5 to help with her low back pain symptoms  The patient would also like to repeat the right trochanteric bursa injection  The most common risks of both procedures were reviewed with the patient and an order was placed for LESI at L4-L5 and right trochnteric bursa injection  Complete risks and benefits including bleeding, infection, tissue reaction, nerve injury and allergic reaction were discussed  The approach was demonstrated using models and literature was provided  Verbal and written consent was obtained  The patient will continue with tizanidine and duloxetine as prescribed  The patient reports she does not need refills of these medications  The patient was instructed she may call for refills of these medications as needed  I will also provide the patient with the Medrol Dosepak to help with the acute exacerbation of her pain symptoms  The patient was instructed not to take NSAIDs while taking this medication, but may continue with tylenol, tizanidine, duloxetine and the oxycodone sparingly, when pain is severe  The patient was agreeable and  verbalized understanding  The patient will follow-up after lumbar epidural steroid injection and right trochanteric bursa injection or sooner if symptoms worsen in the interim    The patient was agreeable and verbalized understanding  My impressions and treatment recommendations were discussed in detail with the patient who verbalized understanding and had no further questions  Discharge instructions were provided  I personally saw and examined the patient and I agree with the above discussed plan of care  Orders Placed This Encounter   Procedures    FL spine and pain procedure     Standing Status:   Future     Standing Expiration Date:   9/21/2025     Order Specific Question:   Reason for Exam:     Answer:   LESI L4-L5; right trochanteric bursa injection     Order Specific Question:   Is the patient pregnant? Answer:   No     Order Specific Question:   Anticoagulant hold needed? Answer:   no     New Medications Ordered This Visit   Medications    methylPREDNISolone 4 MG tablet therapy pack     Sig: Use as directed on package     Dispense:  1 each     Refill:  0       History of Present Illness:  Jomar Li is a 55 y o  female who was last seen 10/22/2020 presents for a follow up office visit in regards to chronic pain syndrome secondary to lumbar radiculopathy, lumbar spondylosis, sacroiliitis, and right trochanteric bursitis  The patients current symptoms include Back Pain, Hip Pain (B/L), and Leg Pain (RLE)  The patient currently reports ongoing low back pain that is worse since last office visit  At the last visit, the patient underwent bilateral L4 transforaminal epidural steroid injection which she reports provided greater than 40% relief of her low back pain symptoms, at that time, for greater than 4 months  Since the last visit, the patient suffered a fall and sustained an L2 compression fracture  The patient is being followed by [facility] and is wearing an LSO brace to help with her pain symptoms  The patient was also prescribed oxycodone by ortho spine; however, the patient reports she has suffered with alcohol abuse and is hesitant to use the medication   The patient also has a history of bariatric surgery and is unable to utilize NSAIDs  The patient reports she has been using tizanidine to help with her pain symptoms with minimal relief  The patient describes her low back pain as intermittent, burning, throbbing pain with numbness  The pain radiates down the lateral aspect her right thigh down to her foot  The patient reports increased pain with prolonged sitting and with bending  The patient denies weakness and reports no bowel or bladder dysfunction, and is able to complete ADLs with minimal difficulty  The patient currently rates her pain as 9/10 on the numeric rating scale  The patient is also prescribed duloxetine 80 mg PO daily  The patient reports her current pain medication regimen provides 60% relief of her pain symptoms  I have personally reviewed and/or updated the patient's past medical history, past surgical history, family history, social history, current medications, allergies, and vital signs today  Review of Systems   Respiratory: Negative for shortness of breath  Cardiovascular: Negative for chest pain  Gastrointestinal: Negative for constipation, diarrhea, nausea and vomiting  Musculoskeletal: Positive for back pain, gait problem and joint swelling  Negative for arthralgias and myalgias  B/L Hip & RLE Pain   Skin: Negative for rash  Neurological: Negative for dizziness, seizures and weakness  All other systems reviewed and are negative        Patient Active Problem List   Diagnosis    Type 2 diabetes mellitus with hyperglycemia, without long-term current use of insulin ([facility] [address] )    Spondylolisthesis at L3-L4 level    Sacroiliitis (HCC)    Lumbar spondylosis    Class 2 severe obesity due to excess calories with serious comorbidity and body mass index (BMI) of 35 0 to 35 9 in [facility])    Non-toxic multinodular goiter    Type 2 diabetes mellitus without complication, without long-term current use of insulin (HCC)    Anemia    Hip pain    Alcohol use    Mild anemia    Neuropathy    Acute cystitis without hematuria    Intervertebral disc disorder with radiculopathy of lumbar region    Lumbar radiculopathy    Closed compression fracture of L2 lumbar vertebra, initial encounter [facility])       Past Medical History:   Diagnosis Date    Low back pain     Non-toxic multinodular goiter        Past Surgical History:   Procedure Laterality Date    STOMACH SURGERY      for morbid obesity / last assessed 9/10/12    US GUIDED THYROID BIOPSY      biopsy thyroid using percutaneous core needle     US GUIDED THYROID BIOPSY  7/1/2020       History reviewed  No pertinent family history      Social History     Occupational History    Not on file   Tobacco Use    Smoking status: Former Smoker    Smokeless tobacco: Never Used   Vaping Use    Vaping Use: Never used   Substance and Sexual Activity    Alcohol use: Yes     Comment: social     Drug use: Never    Sexual activity: Not on file       Current Outpatient Medications on File Prior to Visit   Medication Sig    beta carotene 38168 UNIT capsule Take 25,000 Units by mouth daily    Coenzyme Q10 (CO Q 10) 100 MG CAPS Take 1 capsule by mouth daily    diclofenac (VOLTAREN) 75 mg EC tablet Take 1 tablet (75 mg total) by mouth 2 (two) times a day Take with food    diclofenac sodium (VOLTAREN) 1 % Apply 2 g topically 4 (four) times a day    DULoxetine (CYMBALTA) 20 mg capsule Take 1 tab PO daily with the  60 mg tab of cymbalta    DULoxetine (CYMBALTA) 60 mg delayed release capsule Take 1 capsule (60 mg total) by mouth daily    ferrous sulfate 325 (65 Fe) mg tablet Take 325 mg by mouth daily with breakfast    folic acid (FOLVITE) 735 MCG tablet Take 1 tablet (800 mcg total) by mouth daily    glucose blood (OneTouch Verio) test strip use 1 TEST STRIP to TEST BLOOD SUGAR once daily    Jardiance 25 MG TABS TAKE 1 TABLET (25 MG TOTAL) BY MOUTH EVERY MORNING FOR DIABETES    Lancets (ONETOUCH DELICA PLUS [id number]) MISC 1 each by Does not apply route daily    LORazepam (ATIVAN) 0 5 mg tablet Take 1 tab 1 hours prior to procedure  Can take additional tab 30 minutes later if still anxious    metFORMIN (GLUCOPHAGE-XR) 500 mg 24 hr tablet Take 2 tablets (1,000 mg total) by mouth 2 (two) times a day For diabetes    Omega-3 Fatty Acids (PX SUPER EPA FISH OIL PO) Take 1 capsule by mouth daily    oxyCODONE (ROXICODONE) 5 mg immediate release tablet Take 1 tablet (5 mg total) by mouth every 12 (twelve) hours as needed for severe painMax Daily Amount: 10 mg    TiZANidine (ZANAFLEX) 4 MG capsule Take 1 tab BID prn muscle spasms    Evening Primrose Oil 500 MG CAPS Take 1 capsule (500 mg total) by mouth daily    Milk Thistle 200 MG CAPS Take 2 capsules by mouth daily    naloxone (NARCAN) 4 mg/0 1 mL nasal spray Administer 1 spray into a nostril  If no response after 2-3 minutes, give another dose in the other nostril using a new spray   senna (SENOKOT) 8 6 MG tablet Take 1 tablet by mouth daily     No current facility-administered medications on file prior to visit  No Known Allergies    Physical Exam:    /92   Pulse 94   Resp 18   Ht 5' 9" (1 753 m)   Wt 101 kg (222 lb)   BMI 32 78 kg/m²     Constitutional:normal, well developed, well nourished, alert, in no distress and non-toxic and no overt pain behavior   and overweight  Eyes:anicteric  HEENT:grossly intact  Neck:supple, symmetric, trachea midline and no masses   Pulmonary:even and unlabored  Cardiovascular:No edema or pitting edema present  Skin:Normal without rashes or lesions and well hydrated  Psychiatric:Mood and affect appropriate  Neurologic:Cranial Nerves II-XII grossly intact  Musculoskeletal:normal and LSO brace     Lumbar Spine Exam    Appearance:  Normal lordosis  Palpation/Tenderness:  left lumbar paraspinal tenderness  right lumbar paraspinal tenderness  left sacroiliac joint tenderness  right sacroiliac joint tenderness   Right trochanteric bursa tenderness  Range of Motion:  Flexion: Moderately limited  with pain  Extension:  Moderately limited  with pain  Lateral Flexion - Left:  Moderately limited  with pain  Lateral Flexion - Right:  Moderately limited  with pain  Rotation - Left:  Minimally limited  with pain  Rotation - Right:  Minimally limited  with pain  Motor Strength:  Left hip flexion:  5/5  Left hip extension:  5/5  Right hip flexion:  5/5  Right hip extension:  5/5  Left knee flexion:  5/5  Left knee extension:  5/5  Right knee flexion:  5/5  Right knee extension:  5/5  Left foot dorsiflexion:  5/5  Left foot plantar flexion:  5/5  Right foot dorsiflexion:  5/5  Right foot plantar flexion:  5/5    Imaging    Study Result    Narrative & Impression   MRI LUMBAR SPINE WITHOUT CONTRAST     INDICATION: [id number]: Wedge compression fracture of second lumbar vertebra, initial encounter for closed fracture      COMPARISON:  Plain film dated 8/31/2021 and outside MR study dated 4/26/2019      TECHNIQUE:  Sagittal T1, sagittal T2, sagittal inversion recovery, axial T1 and axial T2, coronal T2     IMAGE QUALITY:  Diagnostic     FINDINGS:     VERTEBRAL BODIES:  There are 5 lumbar type vertebral bodies  Normal alignment of the lumbar spine  There is L3 on L4 grade 1 anterolisthesis that appears secondary to prominent facet arthrosis at this level, stable compared with prior MR study    No   scoliosis  L2 superior endplate vertebral compression deformity with moderate loss of vertebral body height, likely acute versus subacute with marrow edema noted at this level  Normal marrow signal is identified within the visualized bony structures  No discrete marrow lesion      SACRUM:  Normal signal within the sacrum   No evidence of insufficiency or stress fracture      DISTAL CORD AND CONUS:  Normal size and signal within the distal cord and conus      PARASPINAL SOFT TISSUES:  Paraspinal soft tissues are unremarkable      LOWER THORACIC DISC SPACES:  Normal disc height and signal   No disc herniation, canal stenosis or foraminal narrowing      LUMBAR DISC SPACES:     L1-L2:  Normal      L2-L3:  Facet arthrosis without significant central canal or foraminal narrowing      L3-L4:  Modic type II endplate change  Disc desiccation with severe loss of disc height  Diffuse disc bulge with unroofing of the disc secondary to anterolisthesis as above  Severe facet arthrosis  There is mild central canal stenosis with moderate   left and severe right foraminal narrowing  Correlate clinically for right greater than left L3 radiculopathy  Findings appear stable      L4-L5:  Minimal bulge and facet hypertrophy without significant central canal or foraminal narrowing      L5-S1:  Facet arthrosis without encroachment      IMPRESSION:  1  Acute versus subacute L2 superior endplate vertebral compression deformity with moderate loss of vertebral body height  No significant retropulsion  2   Spondylotic changes of the lumbar spine resulting in severe right and moderate left L3-4 foraminal encroachment concerning for right greater than left L3 exiting nerve root impingement  Correlate with clinical symptoms  There is also grade 1   anterolisthesis at this level secondary to facet arthrosis similar to prior outside MR study dated 4/26/2019

## 2021-09-21 NOTE — PATIENT INSTRUCTIONS
Epidural Steroid Injection   AMBULATORY CARE:   What you need to know about an epidural steroid injection (AVTAR):  An AVTAR is a procedure to inject steroid medicine into the epidural space  The epidural space is between your spinal cord and vertebrae  Steroids reduce inflammation and fluid buildup in your spine that may be causing pain  You may be given pain medicine along with the steroids  How to prepare for an AVTAR:  Your healthcare provider will talk to you about how to prepare for your procedure  He or she will tell you what medicines to take or not take on the day of your procedure  You may need to stop taking blood thinners or other medicines several days before your procedure  You may need to adjust any diabetes medicine you take on the day of your procedure  Steroid medicine can increase your blood sugar level  Arrange for someone to drive you home when you are discharged  What will happen during an AVTAR:   · You will be given medicine to numb the procedure area  You will be awake for the procedure, but you will not feel pain  You may also be given medicine to help you relax  Contrast liquid will be used to help your healthcare provider see the area better  Tell the healthcare provider if you have ever had an allergic reaction to contrast liquid  · Your healthcare provider may place the needle into your neck area, middle of your back, or tailbone area  He may inject the medicine next to the nerves that are causing your pain  He may instead inject the medicine into a larger area of the epidural space  This helps the medicine spread to more nerves  Your healthcare provider will use a fluoroscope to help guide the needle to the right place  A fluoroscope is a type of x-ray  After the procedure, a bandage will be placed over the injection site to prevent infection  What will happen after an AVTAR:  You will have a bandage over the injection site to prevent infection   Your healthcare provider will tell you when you can bathe and any activity guidelines  You will be able to go home  Risks of an AVTAR:  You may have temporary or permanent nerve damage or paralysis  You may have bleeding or develop a serious infection, such as meningitis (swelling of the brain coverings)  An abscess may also develop  An abscess is a pus-filled area under the skin  You may need surgery to fix the abscess  You may have a seizure, anxiety, or trouble sleeping  If you are a man, you may have temporary erectile dysfunction (not able to have an erection)  Call your local emergency number (911 in the [facility]) if:   · You have a seizure  · You have trouble moving your legs  Seek care immediately if:   · Blood soaks through your bandage  · You have a fever or chills, severe back pain, and the procedure area is sensitive to the touch  · You cannot control when you urinate or have a bowel movement  Call your doctor if:   · You have weakness or numbness in your legs  · Your wound is red, swollen, or draining pus  · You have nausea or are vomiting  · Your face or neck is red and you feel warm  · You have more pain than you had before the procedure  · You have swelling in your hands or feet  · You have questions or concerns about your condition or care  Care for your wound as directed: You may remove the bandage before you go to bed the day of your procedure  You may take a shower, but do not take a bath for at least 24 hours  Self-care:   · Do not drive,  use machines, or do strenuous activity for 24 hours after your procedure or as directed  · Continue other treatments  as directed  Steroid injections alone will not control your pain  The injections are meant to be used with other treatments, such as physical therapy  Follow up with your doctor as directed:  Write down your questions so you remember to ask them during your visits     © Copyright [employer] 2021 Information is for End User's use only and may not be sold, redistributed or otherwise used for commercial purposes  All illustrations and images included in CareNotes® are the copyrighted property of A D A M [employer] Inc  or Anita Benedict  The above information is an  only  It is not intended as medical advice for individual conditions or treatments  Talk to your doctor, nurse or pharmacist before following any medical regimen to see if it is safe and effective for you

## 2021-09-22 ENCOUNTER — TELEPHONE (OUTPATIENT)
Dept: OBGYN CLINIC | Facility: CLINIC | Age: 56
End: 2021-09-22

## 2021-09-22 ENCOUNTER — OFFICE VISIT (OUTPATIENT)
Dept: OBGYN CLINIC | Facility: CLINIC | Age: 56
End: 2021-09-22
Payer: COMMERCIAL

## 2021-09-22 VITALS
DIASTOLIC BLOOD PRESSURE: 76 MMHG | BODY MASS INDEX: 32.85 KG/M2 | SYSTOLIC BLOOD PRESSURE: 126 MMHG | HEART RATE: 116 BPM | HEIGHT: 69 IN | WEIGHT: 221.8 LBS

## 2021-09-22 DIAGNOSIS — S32.020A CLOSED COMPRESSION FRACTURE OF L2 LUMBAR VERTEBRA, INITIAL ENCOUNTER (HCC): Primary | ICD-10-CM

## 2021-09-22 PROCEDURE — 99213 OFFICE O/P EST LOW 20 MIN: CPT | Performed by: ORTHOPAEDIC SURGERY

## 2021-09-22 PROCEDURE — 3008F BODY MASS INDEX DOCD: CPT | Performed by: ORTHOPAEDIC SURGERY

## 2021-09-22 PROCEDURE — 1036F TOBACCO NON-USER: CPT | Performed by: ORTHOPAEDIC SURGERY

## 2021-09-22 NOTE — PROGRESS NOTES
Assessment:    L2 compression fracture s/p fall, acute  Lumbar DDD, multilevel      Plan:    Continue wearing LSO brace when out of bed  Lumbar spine precautions (limit lifting greater than 5 lbs, twisting, bending)  Continue muscle relaxer for pain control  F/U 4 weeks with Dr Jayson Nolen for re-evaluation and new lumbar spine x-rays        Problem List Items Addressed This Visit        Musculoskeletal and Integument    Closed compression fracture of L2 lumbar vertebra, initial encounter [facility]) - Primary                   Subjective:     Patient ID:  Alexus Rutherford is a 55 y o  female    HPI      55year old female presenting for follow-up  She had a fall in mid-August with subsequent L2 compression, and has been treated non-operatively with a brace  Today, she reports her pain is improved overall  She has been compliant with brace and precautions  She notes the muscle relaxer medication (Zanaflex) helps the most   She is not taking the Oxycodone PRN  She is established with pain management  She offers no acute complaints at today's visit  The following portions of the patient's history were reviewed and updated as appropriate: allergies, current medications, past family history, past medical history, past social history, past surgical history and problem list     Review of Systems   Constitutional: Negative for chills, diaphoresis, fatigue and fever  Respiratory: Negative  Cardiovascular: Negative  Genitourinary: Negative for decreased urine volume and difficulty urinating  Musculoskeletal: Positive for arthralgias and back pain  Negative for gait problem  Skin: Negative  Neurological: Negative for weakness  All other systems reviewed and are negative  Objective:    Imaging:  MRI LUMBAR SPINE 9/16/2021  IMPRESSION:  1  Acute versus subacute L2 superior endplate vertebral compression deformity with moderate loss of vertebral body height  No significant retropulsion    2  Spondylotic changes of the lumbar spine resulting in severe right and moderate left L3-4 foraminal encroachment concerning for right greater than left L3 exiting nerve root impingement  Correlate with clinical symptoms  There is also grade 1   anterolisthesis at this level secondary to facet arthrosis similar to prior outside MR study dated 4/26/2019  Vitals:    09/22/21 1355   BP: 126/76   Pulse: (!) 116       Physical Exam  Vitals and nursing note reviewed  Constitutional:       General: She is not in acute distress  Appearance: Normal appearance  She is not ill-appearing, toxic-appearing or diaphoretic  HENT:      Head: Normocephalic and atraumatic  Eyes:      General:         Right eye: No discharge  Left eye: No discharge  Pulmonary:      Effort: Pulmonary effort is normal  No respiratory distress  Musculoskeletal:         General: Tenderness present  Skin:     General: Skin is warm and dry  Neurological:      General: No focal deficit present  Mental Status: She is alert and oriented to person, place, and time  Gait: Gait normal    Psychiatric:         Mood and Affect: Mood normal          Behavior: Behavior normal           NAD  Gait is without assistance    Inspection no open wounds or erythema  TTP Lumbar spine, L2 region  Motor and sensory stable, L2-S1 bilaterally

## 2021-09-23 ENCOUNTER — TELEPHONE (OUTPATIENT)
Dept: OBGYN CLINIC | Facility: CLINIC | Age: 56
End: 2021-09-23

## 2021-09-23 NOTE — TELEPHONE ENCOUNTER
lmom for pt to cb and set up an appt with dr Efren Soto per dr Morejon Earing in 4 weeks so around 10/20

## 2021-10-07 ENCOUNTER — VBI (OUTPATIENT)
Dept: ADMINISTRATIVE | Facility: OTHER | Age: 56
End: 2021-10-07

## 2021-10-10 DIAGNOSIS — F41.9 ANXIETY: ICD-10-CM

## 2021-10-10 RX ORDER — LORAZEPAM 0.5 MG/1
TABLET ORAL
Qty: 2 TABLET | Refills: 0 | Status: CANCELLED | OUTPATIENT
Start: 2021-10-10

## 2021-10-11 DIAGNOSIS — F41.9 ANXIETY: ICD-10-CM

## 2021-10-11 RX ORDER — LORAZEPAM 0.5 MG/1
TABLET ORAL
Qty: 2 TABLET | Refills: 0 | Status: SHIPPED | OUTPATIENT
Start: 2021-10-11

## 2021-10-12 ENCOUNTER — HOSPITAL ENCOUNTER (OUTPATIENT)
Dept: RADIOLOGY | Facility: CLINIC | Age: 56
Discharge: HOME | End: 2021-10-12
Attending: ANESTHESIOLOGY | Admitting: ANESTHESIOLOGY
Payer: COMMERCIAL

## 2021-10-12 VITALS
SYSTOLIC BLOOD PRESSURE: 146 MMHG | TEMPERATURE: 96.1 F | HEART RATE: 82 BPM | OXYGEN SATURATION: 98 % | DIASTOLIC BLOOD PRESSURE: 87 MMHG | RESPIRATION RATE: 20 BRPM

## 2021-10-12 DIAGNOSIS — M51.16 INTERVERTEBRAL DISC DISORDER WITH RADICULOPATHY OF LUMBAR REGION: ICD-10-CM

## 2021-10-12 DIAGNOSIS — M70.61 TROCHANTERIC BURSITIS OF RIGHT HIP: ICD-10-CM

## 2021-10-12 PROCEDURE — 62323 NJX INTERLAMINAR LMBR/SAC: CPT | Performed by: ANESTHESIOLOGY

## 2021-10-12 RX ORDER — METHYLPREDNISOLONE ACETATE 80 MG/ML
80 INJECTION, SUSPENSION INTRA-ARTICULAR; INTRALESIONAL; INTRAMUSCULAR; PARENTERAL; SOFT TISSUE ONCE
Status: COMPLETED | OUTPATIENT
Start: 2021-10-12 | End: 2021-10-12

## 2021-10-12 RX ORDER — BUPIVACAINE HCL/PF 2.5 MG/ML
2 VIAL (ML) INJECTION ONCE
Status: COMPLETED | OUTPATIENT
Start: 2021-10-12 | End: 2021-10-12

## 2021-10-12 RX ADMIN — METHYLPREDNISOLONE ACETATE 80 MG: 80 INJECTION, SUSPENSION INTRA-ARTICULAR; INTRALESIONAL; INTRAMUSCULAR; PARENTERAL; SOFT TISSUE at 09:18

## 2021-10-12 RX ADMIN — IOHEXOL 1 ML: 300 INJECTION, SOLUTION INTRAVENOUS at 09:18

## 2021-10-12 RX ADMIN — BUPIVACAINE HYDROCHLORIDE 2 ML: 2.5 INJECTION, SOLUTION EPIDURAL; INFILTRATION; INTRACAUDAL at 09:18

## 2021-10-19 ENCOUNTER — TELEPHONE (OUTPATIENT)
Dept: PAIN MEDICINE | Facility: CLINIC | Age: 56
End: 2021-10-19

## 2021-10-27 ENCOUNTER — VBI (OUTPATIENT)
Dept: ADMINISTRATIVE | Facility: OTHER | Age: 56
End: 2021-10-27

## 2021-11-30 ENCOUNTER — VBI (OUTPATIENT)
Dept: ADMINISTRATIVE | Facility: OTHER | Age: 56
End: 2021-11-30

## 2021-12-08 ENCOUNTER — TELEPHONE (OUTPATIENT)
Dept: INTERNAL MEDICINE CLINIC | Facility: CLINIC | Age: 56
End: 2021-12-08

## 2021-12-14 ENCOUNTER — VBI (OUTPATIENT)
Dept: ADMINISTRATIVE | Facility: OTHER | Age: 56
End: 2021-12-14

## 2021-12-14 ENCOUNTER — OFFICE VISIT (OUTPATIENT)
Dept: URGENT CARE | Age: 56
End: 2021-12-14
Payer: COMMERCIAL

## 2021-12-14 VITALS
OXYGEN SATURATION: 99 % | WEIGHT: 210 LBS | BODY MASS INDEX: 30.06 KG/M2 | HEART RATE: 76 BPM | RESPIRATION RATE: 18 BRPM | HEIGHT: 70 IN | TEMPERATURE: 97.8 F

## 2021-12-14 DIAGNOSIS — Z20.822 CONTACT WITH AND (SUSPECTED) EXPOSURE TO COVID-19: Primary | ICD-10-CM

## 2021-12-14 DIAGNOSIS — J06.9 UPPER RESPIRATORY TRACT INFECTION, UNSPECIFIED TYPE: ICD-10-CM

## 2021-12-14 PROCEDURE — G0382 LEV 3 HOSP TYPE B ED VISIT: HCPCS | Performed by: NURSE PRACTITIONER

## 2021-12-14 PROCEDURE — U0005 INFEC AGEN DETEC AMPLI PROBE: HCPCS | Performed by: NURSE PRACTITIONER

## 2021-12-14 PROCEDURE — S9083 URGENT CARE CENTER GLOBAL: HCPCS | Performed by: NURSE PRACTITIONER

## 2021-12-14 PROCEDURE — U0003 INFECTIOUS AGENT DETECTION BY NUCLEIC ACID (DNA OR RNA); SEVERE ACUTE RESPIRATORY SYNDROME CORONAVIRUS 2 (SARS-COV-2) (CORONAVIRUS DISEASE [COVID-19]), AMPLIFIED PROBE TECHNIQUE, MAKING USE OF HIGH THROUGHPUT TECHNOLOGIES AS DESCRIBED BY CMS-2020-01-R: HCPCS | Performed by: NURSE PRACTITIONER

## 2021-12-15 LAB — SARS-COV-2 RNA RESP QL NAA+PROBE: NEGATIVE

## 2021-12-20 ENCOUNTER — TELEMEDICINE (OUTPATIENT)
Dept: INTERNAL MEDICINE CLINIC | Facility: CLINIC | Age: 56
End: 2021-12-20
Payer: COMMERCIAL

## 2021-12-20 ENCOUNTER — APPOINTMENT (OUTPATIENT)
Dept: URGENT CARE | Age: 56
End: 2021-12-20
Payer: COMMERCIAL

## 2021-12-20 DIAGNOSIS — Z20.822 EXPOSURE TO COVID-19 VIRUS: ICD-10-CM

## 2021-12-20 DIAGNOSIS — Z20.822 EXPOSURE TO COVID-19 VIRUS: Primary | ICD-10-CM

## 2021-12-20 PROCEDURE — 1036F TOBACCO NON-USER: CPT | Performed by: NURSE PRACTITIONER

## 2021-12-20 PROCEDURE — U0003 INFECTIOUS AGENT DETECTION BY NUCLEIC ACID (DNA OR RNA); SEVERE ACUTE RESPIRATORY SYNDROME CORONAVIRUS 2 (SARS-COV-2) (CORONAVIRUS DISEASE [COVID-19]), AMPLIFIED PROBE TECHNIQUE, MAKING USE OF HIGH THROUGHPUT TECHNOLOGIES AS DESCRIBED BY CMS-2020-01-R: HCPCS

## 2021-12-20 PROCEDURE — U0005 INFEC AGEN DETEC AMPLI PROBE: HCPCS

## 2021-12-20 PROCEDURE — 99213 OFFICE O/P EST LOW 20 MIN: CPT | Performed by: NURSE PRACTITIONER

## 2021-12-20 RX ORDER — AZITHROMYCIN 250 MG/1
TABLET, FILM COATED ORAL
Qty: 6 TABLET | Refills: 0 | Status: SHIPPED | OUTPATIENT
Start: 2021-12-20 | End: 2021-12-24

## 2021-12-21 LAB — SARS-COV-2 RNA RESP QL NAA+PROBE: NEGATIVE

## 2021-12-30 ENCOUNTER — OFFICE VISIT (OUTPATIENT)
Dept: PAIN MEDICINE | Facility: CLINIC | Age: 56
End: 2021-12-30
Payer: COMMERCIAL

## 2021-12-30 VITALS
RESPIRATION RATE: 16 BRPM | BODY MASS INDEX: 31.5 KG/M2 | SYSTOLIC BLOOD PRESSURE: 133 MMHG | DIASTOLIC BLOOD PRESSURE: 82 MMHG | WEIGHT: 220 LBS | HEIGHT: 70 IN | HEART RATE: 91 BPM

## 2021-12-30 DIAGNOSIS — M79.18 MYOFASCIAL PAIN SYNDROME: Primary | ICD-10-CM

## 2021-12-30 DIAGNOSIS — M51.16 INTERVERTEBRAL DISC DISORDER WITH RADICULOPATHY OF LUMBAR REGION: ICD-10-CM

## 2021-12-30 DIAGNOSIS — M43.16 SPONDYLOLISTHESIS AT L3-L4 LEVEL: ICD-10-CM

## 2021-12-30 DIAGNOSIS — M47.816 LUMBAR SPONDYLOSIS: ICD-10-CM

## 2021-12-30 DIAGNOSIS — M54.16 LUMBAR RADICULOPATHY: ICD-10-CM

## 2021-12-30 DIAGNOSIS — G89.4 CHRONIC PAIN SYNDROME: ICD-10-CM

## 2021-12-30 DIAGNOSIS — G89.29 CHRONIC BILATERAL LOW BACK PAIN, UNSPECIFIED WHETHER SCIATICA PRESENT: ICD-10-CM

## 2021-12-30 DIAGNOSIS — M54.50 CHRONIC BILATERAL LOW BACK PAIN, UNSPECIFIED WHETHER SCIATICA PRESENT: ICD-10-CM

## 2021-12-30 PROCEDURE — 3008F BODY MASS INDEX DOCD: CPT | Performed by: NURSE PRACTITIONER

## 2021-12-30 PROCEDURE — 99214 OFFICE O/P EST MOD 30 MIN: CPT | Performed by: NURSE PRACTITIONER

## 2021-12-30 RX ORDER — DULOXETIN HYDROCHLORIDE 20 MG/1
CAPSULE, DELAYED RELEASE ORAL
Qty: 30 CAPSULE | Refills: 5 | Status: SHIPPED | OUTPATIENT
Start: 2021-12-30

## 2021-12-30 RX ORDER — METHOCARBAMOL 750 MG/1
750 TABLET, FILM COATED ORAL EVERY 8 HOURS SCHEDULED
Qty: 90 TABLET | Refills: 1 | Status: SHIPPED | OUTPATIENT
Start: 2021-12-30

## 2021-12-30 RX ORDER — DULOXETIN HYDROCHLORIDE 60 MG/1
60 CAPSULE, DELAYED RELEASE ORAL DAILY
Qty: 30 CAPSULE | Refills: 5 | Status: SHIPPED | OUTPATIENT
Start: 2021-12-30

## 2022-01-05 ENCOUNTER — VBI (OUTPATIENT)
Dept: ADMINISTRATIVE | Facility: OTHER | Age: 57
End: 2022-01-05

## 2022-01-10 ENCOUNTER — EVALUATION (OUTPATIENT)
Dept: PHYSICAL THERAPY | Age: 57
End: 2022-01-10
Payer: COMMERCIAL

## 2022-01-10 DIAGNOSIS — G89.29 CHRONIC BILATERAL LOW BACK PAIN, UNSPECIFIED WHETHER SCIATICA PRESENT: ICD-10-CM

## 2022-01-10 DIAGNOSIS — M54.50 CHRONIC BILATERAL LOW BACK PAIN, UNSPECIFIED WHETHER SCIATICA PRESENT: ICD-10-CM

## 2022-01-10 DIAGNOSIS — M79.18 MYOFASCIAL PAIN SYNDROME: ICD-10-CM

## 2022-01-10 PROCEDURE — 97112 NEUROMUSCULAR REEDUCATION: CPT | Performed by: PHYSICAL THERAPIST

## 2022-01-10 PROCEDURE — 97163 PT EVAL HIGH COMPLEX 45 MIN: CPT | Performed by: PHYSICAL THERAPIST

## 2022-01-13 ENCOUNTER — OFFICE VISIT (OUTPATIENT)
Dept: PHYSICAL THERAPY | Age: 57
End: 2022-01-13
Payer: COMMERCIAL

## 2022-01-13 DIAGNOSIS — M79.18 MYOFASCIAL PAIN SYNDROME: ICD-10-CM

## 2022-01-13 DIAGNOSIS — M54.50 CHRONIC BILATERAL LOW BACK PAIN, UNSPECIFIED WHETHER SCIATICA PRESENT: Primary | ICD-10-CM

## 2022-01-13 DIAGNOSIS — G89.29 CHRONIC BILATERAL LOW BACK PAIN, UNSPECIFIED WHETHER SCIATICA PRESENT: Primary | ICD-10-CM

## 2022-01-13 PROCEDURE — 97110 THERAPEUTIC EXERCISES: CPT

## 2022-01-13 PROCEDURE — 97112 NEUROMUSCULAR REEDUCATION: CPT

## 2022-01-13 NOTE — PROGRESS NOTES
Daily Note     Today's date: 2022  Patient name: Pako Mccord  : 1965  MRN: [id number]  Referring provider: Freddie Mccray  Dx:   Encounter Diagnosis     ICD-10-CM    1  Chronic bilateral low back pain, unspecified whether sciatica present  M54 50     G89 29    2  Myofascial pain syndrome  M79 18                   Subjective: pt reports HEP compliance with some relief noted, pt reports one of her goals is [place]      Objective: See treatment diary below      Assessment: ex progressions as per PT POC, pt completed there ex wih min discomfort/fatigue     Plan: Continue per plan of care  Progress treatment as tolerated         Precautions: none       Manuals 22                                                                Neuro Re-Ed             GS 30x5"            QS 30x5"            Mini bridge 2x10x3"            Hip 3-way  B/L 2x10 ea            Side steps 4x20'            Standing marches  2x10 ea BL                         Ther Ex             Nustep  RB 5'            Seated self piriformis  5x10"                                                                                          Ther Activity                                       Gait Training                                       Modalities

## 2022-01-17 ENCOUNTER — APPOINTMENT (OUTPATIENT)
Dept: PHYSICAL THERAPY | Age: 57
End: 2022-01-17
Payer: COMMERCIAL

## 2022-01-18 ENCOUNTER — OFFICE VISIT (OUTPATIENT)
Dept: PHYSICAL THERAPY | Age: 57
End: 2022-01-18
Payer: COMMERCIAL

## 2022-01-18 DIAGNOSIS — G89.29 CHRONIC BILATERAL LOW BACK PAIN, UNSPECIFIED WHETHER SCIATICA PRESENT: Primary | ICD-10-CM

## 2022-01-18 DIAGNOSIS — M54.50 CHRONIC BILATERAL LOW BACK PAIN, UNSPECIFIED WHETHER SCIATICA PRESENT: Primary | ICD-10-CM

## 2022-01-18 DIAGNOSIS — M79.18 MYOFASCIAL PAIN SYNDROME: ICD-10-CM

## 2022-01-18 PROCEDURE — 97110 THERAPEUTIC EXERCISES: CPT

## 2022-01-18 PROCEDURE — 97112 NEUROMUSCULAR REEDUCATION: CPT

## 2022-01-18 NOTE — PROGRESS NOTES
Daily Note     Today's date: 2022  Patient name: Franchesca Durham  : 1965  MRN: [id number]  Referring provider: Ольга Waldron  Dx:   Encounter Diagnosis     ICD-10-CM    1  Chronic bilateral low back pain, unspecified whether sciatica present  M54 50     G89 29    2  Myofascial pain syndrome  M79 18                   Subjective: pt reports sx relief with HEP compliance, pt reported some discomfort with BKFO     Objective: See treatment diary below      Assessment: added ex to improve TrA activation with ex and functional activities      Plan: Continue per plan of care  Progress treatment as tolerated         Precautions: none       Manuals 22                                                               Neuro Re-Ed             GS 30x5" 30x10"           QS 30x5" 20x10"           Mini bridge 2x10x3" 2x10x3"           Hip 3-way  B/L 2x10 ea BL 2x10 ea abd/ext           Side steps 4x20' 4x25'           Standing marches  2x10 ea BL 2x15 ea BL                        Ther Ex             Nustep  RB 5' NS 6'           Seated self piriformis  5x10" 5x15" B/L           TrA  2x10x5"           BKFO  10x ea           Supine SLR  10x ea                                                  Ther Activity                                       Gait Training                                       Modalities

## 2022-01-21 ENCOUNTER — OFFICE VISIT (OUTPATIENT)
Dept: PHYSICAL THERAPY | Age: 57
End: 2022-01-21
Payer: COMMERCIAL

## 2022-01-21 DIAGNOSIS — G89.29 CHRONIC BILATERAL LOW BACK PAIN, UNSPECIFIED WHETHER SCIATICA PRESENT: Primary | ICD-10-CM

## 2022-01-21 DIAGNOSIS — M54.50 CHRONIC BILATERAL LOW BACK PAIN, UNSPECIFIED WHETHER SCIATICA PRESENT: Primary | ICD-10-CM

## 2022-01-21 DIAGNOSIS — M79.18 MYOFASCIAL PAIN SYNDROME: ICD-10-CM

## 2022-01-21 PROCEDURE — 97112 NEUROMUSCULAR REEDUCATION: CPT

## 2022-01-21 PROCEDURE — 97110 THERAPEUTIC EXERCISES: CPT

## 2022-01-21 NOTE — PROGRESS NOTES
Daily Note     Today's date: 2022  Patient name: Paul Zambrano  : 1965  MRN: [id number]  Referring provider: Andre Lee  Dx:   Encounter Diagnosis     ICD-10-CM    1  Chronic bilateral low back pain, unspecified whether sciatica present  M54 50     G89 29    2  Myofascial pain syndrome  M79 18                   Subjective: pt reports she's feeling better, "I can cross my legs now, I couldn't do that before the stretches"      Objective: See treatment diary below      Assessment: pt responded well to ex progressions, improved TrA activation noted during there ex    Plan: Continue per plan of care  Progress treatment as tolerated         Precautions: none       Manuals 22                                                              Neuro Re-Ed             GS 30x5" 30x10" 30x10"          QS 30x5" 20x10" 30x10"          Mini bridge 2x10x3" 2x10x3" 2x15x5"          Hip 3-way  B/L 2x10 ea BL 2x10 ea abd/ext 2x10 ea BL          Side steps 4x20' 4x25' 4x25' ea          Standing marches  2x10 ea BL 2x15 ea BL 2x15 ea          TrA marches   2x10 ea          Ther Ex             Nustep  RB 5' NS 6' 10'          Seated self piriformis  5x10" 5x15" B/L 5x15" ea BL          TrA  2x10x5" 20x5"          BKFO  10x ea 2x10 ea          Supine SLR  10x ea 2x10 ea                                                 Ther Activity                                       Gait Training                                       Modalities

## 2022-01-24 ENCOUNTER — OFFICE VISIT (OUTPATIENT)
Dept: PHYSICAL THERAPY | Age: 57
End: 2022-01-24
Payer: COMMERCIAL

## 2022-01-24 DIAGNOSIS — M54.50 CHRONIC BILATERAL LOW BACK PAIN, UNSPECIFIED WHETHER SCIATICA PRESENT: Primary | ICD-10-CM

## 2022-01-24 DIAGNOSIS — G89.29 CHRONIC BILATERAL LOW BACK PAIN, UNSPECIFIED WHETHER SCIATICA PRESENT: Primary | ICD-10-CM

## 2022-01-24 DIAGNOSIS — M79.18 MYOFASCIAL PAIN SYNDROME: ICD-10-CM

## 2022-01-24 PROCEDURE — 97110 THERAPEUTIC EXERCISES: CPT

## 2022-01-24 PROCEDURE — 97112 NEUROMUSCULAR REEDUCATION: CPT

## 2022-01-24 NOTE — PROGRESS NOTES
Daily Note     Today's date: 2022  Patient name: Nesha Botello  : 1965  MRN: [id number]  Referring provider: Nan Garcia  Dx:   Encounter Diagnosis     ICD-10-CM    1  Chronic bilateral low back pain, unspecified whether sciatica present  M54 50     G89 29    2  Myofascial pain syndrome  M79 18                   Subjective: pt reports she's feeling better, less sx since beginning therapy, pt reports she still has some difficulty sitting on floor with legs crossed to meditate       Objective: See treatment diary below      Assessment: ex progressions alexandra well with min fatigue, recommended pt try using blankets or pillows for sitting cross leg on floor      Plan: Continue per plan of care  Progress treatment as tolerated         Precautions: none       Manuals 22                                                             Neuro Re-Ed             GS 30x5" 30x10" 30x10"          QS 30x5" 20x10" 30x10"          Mini bridge 2x10x3" 2x10x3" 2x15x5" [id number]"         Hip 3-way  B/L 2x10 ea BL 2x10 ea abd/ext 2x10 ea BL ytb 10x ea BL         Side steps 4x20' 4x25' 4x25' ea ytb 4x20'         Standing marches  2x10 ea BL 2x15 ea BL 2x15 ea 2x15 ea BL         TrA marches   2x10 ea 2x15 ea BL         Ther Ex    22         Nustep  RB 5' NS 6' 10' 10'         Seated self piriformis  5x10" 5x15" B/L 5x15" ea BL 5x20" ea BL         TrA  2x10x5" 20x5" 20x5"         BKFO  10x ea 2x10 ea 2x15 ea BL         Supine SLR  10x ea 2x10 ea 3x10 ea                                                Ther Activity                                       Gait Training                                       Modalities

## 2022-01-26 ENCOUNTER — VBI (OUTPATIENT)
Dept: ADMINISTRATIVE | Facility: OTHER | Age: 57
End: 2022-01-26

## 2022-01-28 ENCOUNTER — APPOINTMENT (OUTPATIENT)
Dept: PHYSICAL THERAPY | Age: 57
End: 2022-01-28
Payer: COMMERCIAL

## 2022-01-28 DIAGNOSIS — E11.65 TYPE 2 DIABETES MELLITUS WITH HYPERGLYCEMIA, WITHOUT LONG-TERM CURRENT USE OF INSULIN (HCC): ICD-10-CM

## 2022-01-31 ENCOUNTER — VBI (OUTPATIENT)
Dept: ADMINISTRATIVE | Facility: OTHER | Age: 57
End: 2022-01-31

## 2022-01-31 ENCOUNTER — OFFICE VISIT (OUTPATIENT)
Dept: PHYSICAL THERAPY | Age: 57
End: 2022-01-31
Payer: COMMERCIAL

## 2022-01-31 DIAGNOSIS — M54.50 CHRONIC BILATERAL LOW BACK PAIN, UNSPECIFIED WHETHER SCIATICA PRESENT: Primary | ICD-10-CM

## 2022-01-31 DIAGNOSIS — M79.18 MYOFASCIAL PAIN SYNDROME: ICD-10-CM

## 2022-01-31 DIAGNOSIS — G89.29 CHRONIC BILATERAL LOW BACK PAIN, UNSPECIFIED WHETHER SCIATICA PRESENT: Primary | ICD-10-CM

## 2022-01-31 PROCEDURE — 97110 THERAPEUTIC EXERCISES: CPT

## 2022-01-31 PROCEDURE — 97112 NEUROMUSCULAR REEDUCATION: CPT

## 2022-01-31 RX ORDER — EMPAGLIFLOZIN 25 MG/1
25 TABLET, FILM COATED ORAL EVERY MORNING
Qty: 30 TABLET | Refills: 0 | Status: SHIPPED | OUTPATIENT
Start: 2022-01-31 | End: 2022-02-01 | Stop reason: SDUPTHER

## 2022-01-31 NOTE — PROGRESS NOTES
Daily Note     Today's date: 2022  Patient name: Katy Petty  : 1965  MRN: [id number]  Referring provider: Charlie Harper  Dx:   Encounter Diagnosis     ICD-10-CM    1  Chronic bilateral low back pain, unspecified whether sciatica present  M54 50     G89 29    2  Myofascial pain syndrome  M79 18                   Subjective: pt reports migraine over the weekend but overall back/leg is feeling better since beginning therapy      Objective: See treatment diary below      Assessment: ex progressions alexandra well with no increased sx, improved TrA activation noted with dynamic lumbar stabilization ex      Plan: Continue per plan of care  Progress treatment as tolerated         Precautions: none       Manuals 22                                                            Neuro Re-Ed             GS 30x5" 30x10" 30x10"          QS 30x5" 20x10" 30x10"          Mini bridge 2x10x3" 2x10x3" 2x15x5" 1s10e99" 7z10e80"        Hip 3-way  B/L 2x10 ea BL 2x10 ea abd/ext 2x10 ea BL ytb 10x ea BL ytb 3x10 ea BL        Side steps 4x20' 4x25' 4x25' ea ytb 4x20' ytb 8x20'        Standing marches  2x10 ea BL 2x15 ea BL 2x15 ea 2x15 ea BL 2x15 ea BL        TrA marches   2x10 ea 2x15 ea BL 2x15 ea L        Ther Ex    22        Nustep  RB 5' NS 6' 10' 10' 10'        Seated self piriformis  5x10" 5x15" B/L 5x15" ea BL 5x20" ea BL 5x20" ea BL        TrA  2x10x5" 20x5" 20x5" 25x5"        BKFO  10x ea 2x10 ea 2x15 ea BL 2x15 ea BL        Supine SLR  10x ea 2x10 ea 3x10 ea 3x10 ea                                               Ther Activity                                       Gait Training                                       Modalities

## 2022-02-01 DIAGNOSIS — E11.65 TYPE 2 DIABETES MELLITUS WITH HYPERGLYCEMIA, WITHOUT LONG-TERM CURRENT USE OF INSULIN (HCC): ICD-10-CM

## 2022-02-01 RX ORDER — EMPAGLIFLOZIN 25 MG/1
25 TABLET, FILM COATED ORAL EVERY MORNING
Qty: 30 TABLET | Refills: 0 | Status: SHIPPED | OUTPATIENT
Start: 2022-02-01 | End: 2022-03-03

## 2022-02-04 ENCOUNTER — APPOINTMENT (OUTPATIENT)
Dept: PHYSICAL THERAPY | Age: 57
End: 2022-02-04
Payer: COMMERCIAL

## 2022-02-07 ENCOUNTER — OFFICE VISIT (OUTPATIENT)
Dept: PHYSICAL THERAPY | Age: 57
End: 2022-02-07
Payer: COMMERCIAL

## 2022-02-07 DIAGNOSIS — M79.18 MYOFASCIAL PAIN SYNDROME: ICD-10-CM

## 2022-02-07 DIAGNOSIS — M54.50 CHRONIC BILATERAL LOW BACK PAIN, UNSPECIFIED WHETHER SCIATICA PRESENT: Primary | ICD-10-CM

## 2022-02-07 DIAGNOSIS — G89.29 CHRONIC BILATERAL LOW BACK PAIN, UNSPECIFIED WHETHER SCIATICA PRESENT: Primary | ICD-10-CM

## 2022-02-07 PROCEDURE — 97112 NEUROMUSCULAR REEDUCATION: CPT

## 2022-02-07 PROCEDURE — 97110 THERAPEUTIC EXERCISES: CPT

## 2022-02-07 NOTE — PROGRESS NOTES
Daily Note     Today's date: 2022  Patient name: Inocente Arias  : 1965  MRN: [id number]  Referring provider: Liliana Berumen  Dx:   Encounter Diagnosis     ICD-10-CM    1  Chronic bilateral low back pain, unspecified whether sciatica present  M54 50     G89 29    2  Myofascial pain syndrome  M79 18                   Subjective: pt reports she cont to feel better since beginning therapy      Objective: See treatment diary below      Assessment: ex progressions to improve core stability alexandra well with no increased sx      Plan: Continue per plan of care  Progress treatment as tolerated         Precautions: none       Manuals 22                                                           Neuro Re-Ed             GS 30x5" 30x10" 30x10"          QS 30x5" 20x10" 30x10"          Mini bridge 2x10x3" 2x10x3" 2x15x5" 2j93o70" 5m62o77" 2x15x5"       Hip 3-way  B/L 2x10 ea BL 2x10 ea abd/ext 2x10 ea BL ytb 10x ea BL ytb 3x10 ea BL ytb 3x10 ea       Side steps 4x20' 4x25' 4x25' ea ytb 4x20' ytb 8x20' ytb 4x20'       Standing marches  2x10 ea BL 2x15 ea BL 2x15 ea 2x15 ea BL 2x15 ea BL        TrA marches   2x10 ea 2x15 ea BL 2x15 ea L Sit on PB 2x10 ea       Vigor gym      lvl 10 2x2'       Ther Ex    22 2/722       Nustep  RB 5' NS 6' 10' 10' 10' 10'       Seated self piriformis  5x10" 5x15" B/L 5x15" ea BL 5x20" ea BL 5x20" ea BL 5x20" ea BL       TrA  2x10x5" 20x5" 20x5" 25x5"        BKFO  10x ea 2x10 ea 2x15 ea BL 2x15 ea BL 2x15 ea BL 2x10       Supine SLR  10x ea 2x10 ea 3x10 ea 3x10 ea 3x10ea                                              Ther Activity                                       Gait Training                                       Modalities

## 2022-02-11 ENCOUNTER — OFFICE VISIT (OUTPATIENT)
Dept: PHYSICAL THERAPY | Age: 57
End: 2022-02-11
Payer: COMMERCIAL

## 2022-02-11 DIAGNOSIS — G89.29 CHRONIC BILATERAL LOW BACK PAIN, UNSPECIFIED WHETHER SCIATICA PRESENT: Primary | ICD-10-CM

## 2022-02-11 DIAGNOSIS — M79.18 MYOFASCIAL PAIN SYNDROME: ICD-10-CM

## 2022-02-11 DIAGNOSIS — M54.50 CHRONIC BILATERAL LOW BACK PAIN, UNSPECIFIED WHETHER SCIATICA PRESENT: Primary | ICD-10-CM

## 2022-02-11 PROCEDURE — 97112 NEUROMUSCULAR REEDUCATION: CPT

## 2022-02-11 PROCEDURE — 97110 THERAPEUTIC EXERCISES: CPT

## 2022-02-11 NOTE — PROGRESS NOTES
Daily Note     Today's date: 2022  Patient name: Shaista Lopez  : 1965  MRN: [id number]  Referring provider: Beatriz David  Dx:   Encounter Diagnosis     ICD-10-CM    1  Chronic bilateral low back pain, unspecified whether sciatica present  M54 50     G89 29    2  Myofascial pain syndrome  M79 18                   Subjective: pt reports she's able to walk farther with less back pain  Pt reports HEP compliance     Objective: See treatment diary below      Assessment: Tolerated treatment well  Patient would benefit from continued PT      Plan: Continue per plan of care  Progress treatment as tolerated         Precautions: none       Manuals 22                                                      Neuro Re-Ed            GS 30x10" 30x10"          QS 20x10" 30x10"          Mini bridge 2x10x3" 2x15x5" [id number]" 8g67x84" 2x15x5" 2x15x5"      Hip 3-way  BL 2x10 ea abd/ext 2x10 ea BL ytb 10x ea BL ytb 3x10 ea BL ytb 3x10 ea ytb 3x10 ea      Side steps 4x25' 4x25' ea ytb 4x20' ytb 8x20' ytb 4x20' ytb 4x20'      Standing marches  2x15 ea BL 2x15 ea 2x15 ea BL 2x15 ea BL        TrA marches  2x10 ea 2x15 ea BL 2x15 ea L Sit on PB 2x10 ea 2x10 ea      Vigor gym     lvl 10 2x2' lvl 3x2'      Ther Ex   22 2/722       Nustep  NS 6' 10' 10' 10' 10' RB 10'      Seated self piriformis  5x15" B/L 5x15" ea BL 5x20" ea BL 5x20" ea BL 5x20" ea BL 5x20" ea      TrA 2x10x5" 20x5" 20x5" 25x5"        BKFO 10x ea 2x10 ea 2x15 ea BL 2x15 ea BL 2x15 ea BL 2x10 2x15 ea      Supine SLR 10x ea 2x10 ea 3x10 ea 3x10 ea 3x10ea 3x10 ea                                          Ther Activity                                    Gait Training                                    Modalities

## 2022-02-14 ENCOUNTER — OFFICE VISIT (OUTPATIENT)
Dept: PHYSICAL THERAPY | Age: 57
End: 2022-02-14
Payer: COMMERCIAL

## 2022-02-14 DIAGNOSIS — G89.29 CHRONIC BILATERAL LOW BACK PAIN, UNSPECIFIED WHETHER SCIATICA PRESENT: Primary | ICD-10-CM

## 2022-02-14 DIAGNOSIS — M79.18 MYOFASCIAL PAIN SYNDROME: ICD-10-CM

## 2022-02-14 DIAGNOSIS — M54.50 CHRONIC BILATERAL LOW BACK PAIN, UNSPECIFIED WHETHER SCIATICA PRESENT: Primary | ICD-10-CM

## 2022-02-14 PROCEDURE — 97112 NEUROMUSCULAR REEDUCATION: CPT

## 2022-02-14 PROCEDURE — 97110 THERAPEUTIC EXERCISES: CPT

## 2022-02-14 NOTE — PROGRESS NOTES
Daily Note     Today's date: 2022  Patient name: Tori Henley  : 1965  MRN: [id number]  Referring provider: Reid Dawson  Dx:   Encounter Diagnosis     ICD-10-CM    1  Chronic bilateral low back pain, unspecified whether sciatica present  M54 50     G89 29    2  Myofascial pain syndrome  M79 18                   Subjective: pt reports brief tingling R LE when she stood up from bending over, but hasn't happened since, pt reports relief with treatment      Objective: See treatment diary below      Assessment: Tolerated treatment well  Patient demonstrated fatigue post treatment and would benefit from continued PT      Plan: Continue per plan of care  Progress treatment as tolerated         Precautions: none       Manuals 22                                                     Neuro Re-Ed            GS 30x10" 30x10"          QS 20x10" 30x10"          Mini bridge 2x10x3" 2x15x5" 1w40d80" 1r35o72" 2x15x5" 2x15x5" 2x15x5"     Hip 3-way  BL 2x10 ea abd/ext 2x10 ea BL ytb 10x ea BL ytb 3x10 ea BL ytb 3x10 ea ytb 3x10 ea ytb 3x10 ea     Side steps 4x25' 4x25' ea ytb 4x20' ytb 8x20' ytb 4x20' ytb 4x20' ytb 4x20'     Standing marches  2x15 ea BL 2x15 ea 2x15 ea BL 2x15 ea BL        TrA marches  2x10 ea 2x15 ea BL 2x15 ea L Sit on PB 2x10 ea 2x10 ea Sit on ball 2x12     Vigor gym     lvl 10 2x2' lvl 3x2' lvl10 3x2'     Ther Ex   22 2/722  22     Nustep  NS 6' 10' 10' 10' 10' RB 10' NS 10'      Seated self piriformis  5x15" B/L 5x15" ea BL 5x20" ea BL 5x20" ea BL 5x20" ea BL 5x20" ea 5x20" ea BL     TrA 2x10x5" 20x5" 20x5" 25x5"        BKFO 10x ea 2x10 ea 2x15 ea BL 2x15 ea BL 2x15 ea BL 2x10 2x15 ea 2x15 ea      Supine SLR 10x ea 2x10 ea 3x10 ea 3x10 ea 3x10ea 3x10 ea 2x15 ea                                         Ther Activity                                    Gait Training                                    Modalities

## 2022-02-16 ENCOUNTER — APPOINTMENT (OUTPATIENT)
Dept: PHYSICAL THERAPY | Age: 57
End: 2022-02-16
Payer: COMMERCIAL

## 2022-02-18 ENCOUNTER — OFFICE VISIT (OUTPATIENT)
Dept: PHYSICAL THERAPY | Age: 57
End: 2022-02-18
Payer: COMMERCIAL

## 2022-02-18 DIAGNOSIS — G89.29 CHRONIC BILATERAL LOW BACK PAIN, UNSPECIFIED WHETHER SCIATICA PRESENT: Primary | ICD-10-CM

## 2022-02-18 DIAGNOSIS — M54.50 CHRONIC BILATERAL LOW BACK PAIN, UNSPECIFIED WHETHER SCIATICA PRESENT: Primary | ICD-10-CM

## 2022-02-18 DIAGNOSIS — M79.18 MYOFASCIAL PAIN SYNDROME: ICD-10-CM

## 2022-02-18 PROCEDURE — 97110 THERAPEUTIC EXERCISES: CPT

## 2022-02-18 PROCEDURE — 97112 NEUROMUSCULAR REEDUCATION: CPT

## 2022-02-18 NOTE — PROGRESS NOTES
Daily Note     Today's date: 2022  Patient name: Ganesh De La Cruz  : 1965  MRN: [id number]  Referring provider: Gabriel Obrien  Dx:   Encounter Diagnosis     ICD-10-CM    1  Chronic bilateral low back pain, unspecified whether sciatica present  M54 50     G89 29    2  Myofascial pain syndrome  M79 18                   Subjective: pt reports there ex has really helped decrease her sx      Objective: See treatment diary below      Assessment: ex progressions alexandra well, improved TrA activation noted    Plan: Continue per plan of care  Progress treatment as tolerated         Precautions: none       Manuals 22                                                    Neuro Re-Ed            GS 30x10" 30x10"          QS 20x10" 30x10"          Mini bridge 2x10x3" 2x15x5" 0l82t48" 1d55k05" 2x15x5" 2x15x5" 2x15x5" bridge with rtb 2x10    Hip 3-way  BL 2x10 ea abd/ext 2x10 ea BL ytb 10x ea BL ytb 3x10 ea BL ytb 3x10 ea ytb 3x10 ea ytb 3x10 ea rtb 2x10 ea    Side steps 4x25' 4x25' ea ytb 4x20' ytb 8x20' ytb 4x20' ytb 4x20' ytb 4x20' rtb 4x20'    Standing marches  2x15 ea BL 2x15 ea 2x15 ea BL 2x15 ea BL        TrA marches  2x10 ea 2x15 ea BL 2x15 ea L Sit on PB 2x10 ea 2x10 ea Sit on ball 2x12     Vigor gym     lvl 10 2x2' lvl 3x2' lvl10 3x2' lvl 10 5'    Supine alt ue/le        2x10                Ther Ex   22 2722  22     Nustep  NS 6' 10' 10' 10' 10' RB 10' NS 10'  TM 10' 2 3 mph    Seated self piriformis  5x15" B/L 5x15" ea BL 5x20" ea BL 5x20" ea BL 5x20" ea BL 5x20" ea 5x20" ea BL 5x20" ea    TrA 2x10x5" 20x5" 20x5" 25x5"        BKFO 10x ea 2x10 ea 2x15 ea BL 2x15 ea BL 2x15 ea BL 2x10 2x15 ea 2x15 ea  rtb 2x10 ea    Supine SLR 10x ea 2x10 ea 3x10 ea 3x10 ea 3x10ea 3x10 ea 2x15 ea 2x15 ea                                        Ther Activity                                    Gait Training Modalities

## 2022-02-21 ENCOUNTER — OFFICE VISIT (OUTPATIENT)
Dept: PHYSICAL THERAPY | Age: 57
End: 2022-02-21
Payer: COMMERCIAL

## 2022-02-21 DIAGNOSIS — M54.50 CHRONIC BILATERAL LOW BACK PAIN, UNSPECIFIED WHETHER SCIATICA PRESENT: Primary | ICD-10-CM

## 2022-02-21 DIAGNOSIS — G89.29 CHRONIC BILATERAL LOW BACK PAIN, UNSPECIFIED WHETHER SCIATICA PRESENT: Primary | ICD-10-CM

## 2022-02-21 DIAGNOSIS — M79.18 MYOFASCIAL PAIN SYNDROME: ICD-10-CM

## 2022-02-21 PROCEDURE — 97112 NEUROMUSCULAR REEDUCATION: CPT

## 2022-02-21 PROCEDURE — 97110 THERAPEUTIC EXERCISES: CPT

## 2022-02-21 NOTE — PROGRESS NOTES
Daily Note     Today's date: 2022  Patient name: Kelsey Neal  : 1965  MRN: [id number]  Referring provider: Ayo Lua  Dx:   Encounter Diagnosis     ICD-10-CM    1  Chronic bilateral low back pain, unspecified whether sciatica present  M54 50     G89 29    2  Myofascial pain syndrome  M79 18                   Subjective: pt reports she cont to feel good with improved mobility and less pain    Objective: See treatment diary below      Assessment: Tolerated treatment well  Patient exhibited good technique with therapeutic exercises      Plan: Potential discharge next visit       Precautions: none       Manuals 22                                               Neuro Re-Ed           GS 30x10"          QS 30x10"          Mini bridge 2x15x5" 4e13h52" 3p50f20" 2x15x5" 2x15x5" 2x15x5" bridge with rtb 2x10 rtb 3x10x5"   Hip 3-way  2x10 ea BL ytb 10x ea BL ytb 3x10 ea BL ytb 3x10 ea ytb 3x10 ea ytb 3x10 ea rtb 2x10 ea rtb 3x10 ea   Side steps 4x25' ea ytb 4x20' ytb 8x20' ytb 4x20' ytb 4x20' ytb 4x20' rtb 4x20' rtb 4x20'   Standing marches  2x15 ea 2x15 ea BL 2x15 ea BL        TrA marches 2x10 ea 2x15 ea BL 2x15 ea L Sit on PB 2x10 ea 2x10 ea Sit on ball 2x12  3x10 sit on PB   Vigor gym    lvl 10 2x2' lvl 3x2' lvl10 3x2' lvl 10 5' 5' lvl 10   Supine alt ue/le       2x10 2x10 ea              Ther Ex  22 2722  22     Nustep  10' 10' 10' 10' RB 10' NS 10'  TM 10' 2 3 mph TM 10' 2 5 mph   Seated self piriformis  5x15" ea BL 5x20" ea BL 5x20" ea BL 5x20" ea BL 5x20" ea 5x20" ea BL 5x20" ea 5x20" ea   TrA 20x5" 20x5" 25x5"        BKFO 2x10 ea 2x15 ea BL 2x15 ea BL 2x15 ea BL 2x10 2x15 ea 2x15 ea  rtb 2x10 ea rtb 3x10   Supine SLR 2x10 ea 3x10 ea 3x10 ea 3x10ea 3x10 ea 2x15 ea 2x15 ea 2x15 ea                                    Ther Activity                                 Gait Training                                 Modalities

## 2022-02-25 ENCOUNTER — APPOINTMENT (OUTPATIENT)
Dept: PHYSICAL THERAPY | Age: 57
End: 2022-02-25
Payer: COMMERCIAL

## 2022-02-28 ENCOUNTER — APPOINTMENT (OUTPATIENT)
Dept: PHYSICAL THERAPY | Age: 57
End: 2022-02-28
Payer: COMMERCIAL

## 2022-02-28 ENCOUNTER — EVALUATION (OUTPATIENT)
Dept: PHYSICAL THERAPY | Age: 57
End: 2022-02-28
Payer: COMMERCIAL

## 2022-02-28 DIAGNOSIS — M79.18 MYOFASCIAL PAIN SYNDROME: ICD-10-CM

## 2022-02-28 DIAGNOSIS — G89.29 CHRONIC BILATERAL LOW BACK PAIN, UNSPECIFIED WHETHER SCIATICA PRESENT: Primary | ICD-10-CM

## 2022-02-28 DIAGNOSIS — M54.50 CHRONIC BILATERAL LOW BACK PAIN, UNSPECIFIED WHETHER SCIATICA PRESENT: Primary | ICD-10-CM

## 2022-02-28 PROCEDURE — 97110 THERAPEUTIC EXERCISES: CPT

## 2022-02-28 PROCEDURE — 97112 NEUROMUSCULAR REEDUCATION: CPT

## 2022-02-28 NOTE — PROGRESS NOTES
Assessment  Assessment details: Patient demonstrates improvement through physical therapy and will be D/C to HEP  She notes improvements in thoracolumbar range of motion and strength  Impairments: abnormal or restricted ROM, activity intolerance, impaired physical strength, lacks appropriate home exercise program and pain with function     Prognosis: fair    Goals  ST- demonstrate compliancy with HEP in 1 week   Met   Decrease reported pain to 5/10 at worst and with activity, to improve functional capacity, within 3 weeks met  Improve thoracolumbar range of motion to only minimal limitation, within 3 weeks met    LT- Improve FOTO score to specified value to improve patients perceived benefit of therapy, in 8 weeks met   Improve B/L LE strength to 4+, to improve ability to complete ADL's within 8 weeks partially met     Plan  D/C to HEP      Subjective Evaluation    History of Present Illness  Date of onset: 2017  Mechanism of injury: Chronic onset, repetitive work activities   Pain  Current pain ratin  At best pain ratin  At worst pain ratin  Location: central lumbar spine   Quality: burning, sharp and dull ache  Relieving factors: relaxation, rest and ice  Aggravating factors: sitting, stair climbing, standing and walking  Progression: no change      Diagnostic Tests  MRI studies: abnormal  Treatments  Previous treatment: chiropractic and injection treatment  Current treatment: medication  Patient Goals  Patient goals for therapy: decreased pain and independence with ADLs/IADLs  Patient goal: be able to move to R BHAVESH James Comments:      Lumbar Comments  rom  Motions all minimally limited secondary to pain per pt     mmt  Hip flexion L=3+ R=3+  Hip abduction L=4 R=4  Hip adduction L=4 R=4  Knee extension L=4 R=4

## 2022-02-28 NOTE — PROGRESS NOTES
Discharge    Today's date: 2022  Patient name: Osorio Nina  : 1965  MRN: [id number]  Referring provider: Merline Butters  Dx:   Encounter Diagnosis     ICD-10-CM    1  Chronic bilateral low back pain, unspecified whether sciatica present  M54 50     G89 29    2   Myofascial pain syndrome  M79 18                   Subjective:  Pt reports she cont to feel good, pt reports she's moving better with overall decreased pain, pt reports no functional limitations at this time      Objective: See treatment diary below      Assessment: pt doing well with HEP, pt performed ex with good technique and no cueing required       Plan: Dc as per PT POC     Precautions: none       Manuals 22                                                   Neuro Re-Ed            GS 30x10"           QS 30x10"           Mini bridge 2x15x5" 3g87n12" 2r55a01" 2x15x5" 2x15x5" 2x15x5" bridge with rtb 2x10 rtb 3x10x5" rtb 3x10x5"   Hip 3-way  2x10 ea BL ytb 10x ea BL ytb 3x10 ea BL ytb 3x10 ea ytb 3x10 ea ytb 3x10 ea rtb 2x10 ea rtb 3x10 ea rtb 3x10   Side steps 4x25' ea ytb 4x20' ytb 8x20' ytb 4x20' ytb 4x20' ytb 4x20' rtb 4x20' rtb 4x20' rtb 4x20'   Standing marches  2x15 ea 2x15 ea BL 2x15 ea BL         TrA marches 2x10 ea 2x15 ea BL 2x15 ea L Sit on PB 2x10 ea 2x10 ea Sit on ball 2x12  3x10 sit on PB 3x10 on PB   Vigor gym    lvl 10 2x2' lvl 3x2' lvl10 3x2' lvl 10 5' 5' lvl 10 5' lvl 10   Supine alt ue/le       2x10 2x10 ea                Ther Ex  22 2/722  22      Nustep  10' 10' 10' 10' RB 10' NS 10'  TM 10' 2 3 mph TM 10' 2 5 mph NS 10'   Seated self piriformis  5x15" ea BL 5x20" ea BL 5x20" ea BL 5x20" ea BL 5x20" ea 5x20" ea BL 5x20" ea 5x20" ea 5x20" ea   TrA 20x5" 20x5" 25x5"         BKFO 2x10 ea 2x15 ea BL 2x15 ea BL 2x15 ea BL 2x10 2x15 ea 2x15 ea  rtb 2x10 ea rtb 3x10 rtb 3x10   Supine SLR 2x10 ea 3x10 ea 3x10 ea 3x10ea 3x10 ea 2x15 ea 2x15 ea 2x15 ea 2x15 ea                                       Ther Activity                                    Gait Training                                    Modalities

## 2022-03-01 ENCOUNTER — TELEPHONE (OUTPATIENT)
Dept: INTERNAL MEDICINE CLINIC | Facility: CLINIC | Age: 57
End: 2022-03-01

## 2022-05-03 ENCOUNTER — VBI (OUTPATIENT)
Dept: ADMINISTRATIVE | Facility: OTHER | Age: 57
End: 2022-05-03

## 2022-05-10 ENCOUNTER — VBI (OUTPATIENT)
Dept: ADMINISTRATIVE | Facility: OTHER | Age: 57
End: 2022-05-10

## 2022-05-12 ENCOUNTER — VBI (OUTPATIENT)
Dept: ADMINISTRATIVE | Facility: OTHER | Age: 57
End: 2022-05-12

## 2022-06-28 ENCOUNTER — VBI (OUTPATIENT)
Dept: ADMINISTRATIVE | Facility: OTHER | Age: 57
End: 2022-06-28

## 2022-08-09 ENCOUNTER — VBI (OUTPATIENT)
Dept: ADMINISTRATIVE | Facility: OTHER | Age: 57
End: 2022-08-09

## 2022-08-17 ENCOUNTER — OFFICE VISIT (OUTPATIENT)
Dept: INTERNAL MEDICINE CLINIC | Facility: CLINIC | Age: 57
End: 2022-08-17
Payer: COMMERCIAL

## 2022-08-17 VITALS
OXYGEN SATURATION: 95 % | HEIGHT: 70 IN | WEIGHT: 240.4 LBS | BODY MASS INDEX: 34.41 KG/M2 | HEART RATE: 87 BPM | SYSTOLIC BLOOD PRESSURE: 136 MMHG | DIASTOLIC BLOOD PRESSURE: 78 MMHG

## 2022-08-17 DIAGNOSIS — Z23 ENCOUNTER FOR IMMUNIZATION: ICD-10-CM

## 2022-08-17 DIAGNOSIS — Z13.820 SCREENING FOR OSTEOPOROSIS: ICD-10-CM

## 2022-08-17 DIAGNOSIS — F33.9 EPISODE OF RECURRENT MAJOR DEPRESSIVE DISORDER, UNSPECIFIED DEPRESSION EPISODE SEVERITY (HCC): ICD-10-CM

## 2022-08-17 DIAGNOSIS — E11.65 TYPE 2 DIABETES MELLITUS WITH HYPERGLYCEMIA, WITHOUT LONG-TERM CURRENT USE OF INSULIN (HCC): ICD-10-CM

## 2022-08-17 DIAGNOSIS — Z12.31 ENCOUNTER FOR SCREENING MAMMOGRAM FOR BREAST CANCER: ICD-10-CM

## 2022-08-17 DIAGNOSIS — E66.01 CLASS 2 SEVERE OBESITY DUE TO EXCESS CALORIES WITH SERIOUS COMORBIDITY AND BODY MASS INDEX (BMI) OF 35.0 TO 35.9 IN ADULT (HCC): ICD-10-CM

## 2022-08-17 DIAGNOSIS — Z12.4 SCREENING FOR CERVICAL CANCER: ICD-10-CM

## 2022-08-17 DIAGNOSIS — E11.9 TYPE 2 DIABETES MELLITUS WITHOUT COMPLICATION, WITHOUT LONG-TERM CURRENT USE OF INSULIN (HCC): Primary | ICD-10-CM

## 2022-08-17 LAB
CREAT UR-MCNC: 13.3 MG/DL
MICROALBUMIN UR-MCNC: <5 MG/L (ref 0–20)
MICROALBUMIN/CREAT 24H UR: <38 MG/G CREATININE (ref 0–30)
SL AMB POCT HEMOGLOBIN AIC: 10.5 (ref ?–6.5)

## 2022-08-17 PROCEDURE — 82570 ASSAY OF URINE CREATININE: CPT | Performed by: INTERNAL MEDICINE

## 2022-08-17 PROCEDURE — 83036 HEMOGLOBIN GLYCOSYLATED A1C: CPT | Performed by: INTERNAL MEDICINE

## 2022-08-17 PROCEDURE — 99214 OFFICE O/P EST MOD 30 MIN: CPT | Performed by: INTERNAL MEDICINE

## 2022-08-17 PROCEDURE — 3075F SYST BP GE 130 - 139MM HG: CPT | Performed by: INTERNAL MEDICINE

## 2022-08-17 PROCEDURE — 3046F HEMOGLOBIN A1C LEVEL >9.0%: CPT | Performed by: INTERNAL MEDICINE

## 2022-08-17 PROCEDURE — 82043 UR ALBUMIN QUANTITATIVE: CPT | Performed by: INTERNAL MEDICINE

## 2022-08-17 PROCEDURE — 3725F SCREEN DEPRESSION PERFORMED: CPT | Performed by: INTERNAL MEDICINE

## 2022-08-17 PROCEDURE — 3078F DIAST BP <80 MM HG: CPT | Performed by: INTERNAL MEDICINE

## 2022-08-17 RX ORDER — DULOXETIN HYDROCHLORIDE 30 MG/1
30 CAPSULE, DELAYED RELEASE ORAL DAILY
Qty: 30 CAPSULE | Refills: 1 | Status: SHIPPED | OUTPATIENT
Start: 2022-08-17 | End: 2022-09-20 | Stop reason: SDUPTHER
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
## 2022-08-18 ENCOUNTER — TELEPHONE (OUTPATIENT)
Dept: PSYCHOLOGY | Facility: CLINIC | Age: 57
End: 2022-08-18

## 2022-08-18 ENCOUNTER — VBI (OUTPATIENT)
Dept: ADMINISTRATIVE | Facility: OTHER | Age: 57
End: 2022-08-18

## 2022-08-21 PROBLEM — F33.9 EPISODE OF RECURRENT MAJOR DEPRESSIVE DISORDER (HCC): Status: ACTIVE | Noted: 2022-08-21

## 2022-08-21 NOTE — ASSESSMENT & PLAN NOTE
Major depression currently no suicidal ideation but the patient does reports me thoughts of suicide in the past that have resolved I have offered hospitalization she declined she is willing to start the partial program innovations program at [facility] she is also contracted with me if she has any future symptoms of SI she will notify me immediately    Will start her on Cymbalta 30 mg once daily, order for the innovations program, I did contact counselor Shahla Carney in 4 weeks call if any problems

## 2022-08-23 ENCOUNTER — VBI (OUTPATIENT)
Dept: ADMINISTRATIVE | Facility: OTHER | Age: 57
End: 2022-08-23

## 2022-08-24 ENCOUNTER — TELEPHONE (OUTPATIENT)
Dept: OTHER | Facility: OTHER | Age: 57
End: 2022-08-24

## 2022-08-24 NOTE — TELEPHONE ENCOUNTER
Spoke with patient; she said she called her insurance company and that the Jinny Scott is primary and the [facility]dirah[facility] Sinha [address] is secondary  Patient wanted to make the office aware

## 2022-08-24 NOTE — TELEPHONE ENCOUNTER
Patient called, regarding PA needed for medication  Pt mentioned that insurance needs to in the order of [employer] as primary and [employer] as secondary       [facility]: [phone]  Fax: [phone]

## 2022-08-24 NOTE — TELEPHONE ENCOUNTER
Pt called in stating she is having issues with picking up her Jardiance 25mg tablets from her pharmacy  Pt is unsure why  She believes they mentioned something about a prior auth   Please advise

## 2022-08-24 NOTE — TELEPHONE ENCOUNTER
Spoke to pharmacy  Jardiance 25 mg does require a PA  The pharmacy is running the prescription under the patients BC/BS as primary however patient states that [facility] is her primary carrier  I advised her that I [facility] but it was rejected indicating that they are unable to authorize as it is her secondary insurance and the auth needs to be processed through the primary carrier  Patient was advised to call and speak with insurance to see which carrier would take care of the PA  Patient is to call back with this info so we can do the prior authorization

## 2022-08-25 DIAGNOSIS — E11.65 TYPE 2 DIABETES MELLITUS WITH HYPERGLYCEMIA, WITHOUT LONG-TERM CURRENT USE OF INSULIN (HCC): ICD-10-CM

## 2022-08-25 NOTE — TELEPHONE ENCOUNTER
Spoke to Tomas Massey to advise they run the 3D Forms under the patients [employer] since she states this is primary  Pharmacist states that it is rejecting still and indicating it should be sent to primary insurance  I spoke to patient and advised  She states that she was told by Vikram Anderson that the change would be made to indicate that they are the primary insurance however the change may not take place until Monday 8/29/22  Patient advised  that she would like medication sent to [facility] for a 3 month supply as it will be cheaper  I LM at [facility] to cancel the prescription for the Jardiance  I sent a new prescription to Dr Loretta Marie to authorize to [address] [place] [address]  Will see if it requires an authorization once it's processed  Dr Loretta Marie-    Please authorize to pharmacy

## 2022-08-30 NOTE — TELEPHONE ENCOUNTER
I have not received any notification from the pharmacy that Angella Marti has been rejected through mail order  I advised the patient that the prescription was sent on 8/25/22 and that she should double check with the pharmacy by Friday to make sure there are no issues and that it can be processed  Patient agreed and verbalized understanding  She did want me to advise that her 's quarantine ended yesterday and he has returned to work  She has not developed any symptoms and wants to know if you think she is in the clear  She does have Covid home kits to test herself if you think she needs to  Please advise

## 2022-09-01 ENCOUNTER — TELEPHONE (OUTPATIENT)
Dept: INTERNAL MEDICINE CLINIC | Facility: CLINIC | Age: 57
End: 2022-09-01

## 2022-09-01 NOTE — TELEPHONE ENCOUNTER
Ethelnikki Reyez has not been denied as PA has not been completed due to insurance issue  Patient was advised that she needs to contact both of her insurances and have them indicate in their system which is primary/secondary so that the PA can be attempted  Patient states that she will call her insurance and let us know the outcome

## 2022-09-01 NOTE — TELEPHONE ENCOUNTER
Pt called regarding her denial for Jardiance  She wanted more information on why it was denied   Please call her back at [phone] She also wanted to document that she took a covid home test yesterday and was negative  Jackie Kirby

## 2022-09-09 ENCOUNTER — VBI (OUTPATIENT)
Dept: ADMINISTRATIVE | Facility: OTHER | Age: 57
End: 2022-09-09

## 2022-09-14 ENCOUNTER — RA CDI HCC (OUTPATIENT)
Dept: OTHER | Facility: HOSPITAL | Age: 57
End: 2022-09-14

## 2022-09-14 NOTE — PROGRESS NOTES
[facility]  coding opportunities       Chart reviewed, no opportunity found:   [address]        Patients Insurance     Medicare Insurance: [employer]vantage

## 2022-09-17 ENCOUNTER — APPOINTMENT (OUTPATIENT)
Dept: LAB | Age: 57
End: 2022-09-17
Payer: COMMERCIAL

## 2022-09-17 DIAGNOSIS — E11.9 TYPE 2 DIABETES MELLITUS WITHOUT COMPLICATION, WITHOUT LONG-TERM CURRENT USE OF INSULIN (HCC): ICD-10-CM

## 2022-09-17 LAB
ALBUMIN SERPL BCP-MCNC: 3.9 G/DL (ref 3.5–5)
ALP SERPL-CCNC: 111 U/L (ref 46–116)
ALT SERPL W P-5'-P-CCNC: 35 U/L (ref 12–78)
ANION GAP SERPL CALCULATED.3IONS-SCNC: 2 MMOL/L (ref 4–13)
AST SERPL W P-5'-P-CCNC: 15 U/L (ref 5–45)
BILIRUB SERPL-MCNC: 0.45 MG/DL (ref 0.2–1)
BUN SERPL-MCNC: 10 MG/DL (ref 5–25)
CALCIUM SERPL-MCNC: 9.1 MG/DL (ref 8.3–10.1)
CHLORIDE SERPL-SCNC: 102 MMOL/L (ref 96–108)
CHOLEST SERPL-MCNC: 187 MG/DL
CO2 SERPL-SCNC: 29 MMOL/L (ref 21–32)
CREAT SERPL-MCNC: 0.64 MG/DL (ref 0.6–1.3)
GFR SERPL CREATININE-BSD FRML MDRD: 100 ML/MIN/1.73SQ M
GLUCOSE P FAST SERPL-MCNC: 183 MG/DL (ref 65–99)
HDLC SERPL-MCNC: 50 MG/DL
LDLC SERPL CALC-MCNC: 113 MG/DL (ref 0–100)
POTASSIUM SERPL-SCNC: 4.9 MMOL/L (ref 3.5–5.3)
PROT SERPL-MCNC: 7.9 G/DL (ref 6.4–8.4)
SODIUM SERPL-SCNC: 133 MMOL/L (ref 135–147)
TRIGL SERPL-MCNC: 120 MG/DL

## 2022-09-17 PROCEDURE — 36415 COLL VENOUS BLD VENIPUNCTURE: CPT

## 2022-09-17 PROCEDURE — 80053 COMPREHEN METABOLIC PANEL: CPT

## 2022-09-17 PROCEDURE — 80061 LIPID PANEL: CPT

## 2022-09-20 ENCOUNTER — OFFICE VISIT (OUTPATIENT)
Dept: INTERNAL MEDICINE CLINIC | Facility: CLINIC | Age: 57
End: 2022-09-20
Payer: COMMERCIAL

## 2022-09-20 VITALS
HEART RATE: 84 BPM | SYSTOLIC BLOOD PRESSURE: 122 MMHG | RESPIRATION RATE: 16 BRPM | HEIGHT: 70 IN | DIASTOLIC BLOOD PRESSURE: 82 MMHG | OXYGEN SATURATION: 96 % | WEIGHT: 233 LBS | BODY MASS INDEX: 33.36 KG/M2

## 2022-09-20 DIAGNOSIS — Z11.59 NEED FOR HEPATITIS C SCREENING TEST: ICD-10-CM

## 2022-09-20 DIAGNOSIS — E11.65 TYPE 2 DIABETES MELLITUS WITH HYPERGLYCEMIA, WITHOUT LONG-TERM CURRENT USE OF INSULIN (HCC): ICD-10-CM

## 2022-09-20 DIAGNOSIS — Z12.2 ENCOUNTER FOR SCREENING FOR LUNG CANCER: ICD-10-CM

## 2022-09-20 DIAGNOSIS — M51.16 INTERVERTEBRAL DISC DISORDER WITH RADICULOPATHY OF LUMBAR REGION: ICD-10-CM

## 2022-09-20 DIAGNOSIS — F33.0 MILD EPISODE OF RECURRENT MAJOR DEPRESSIVE DISORDER (HCC): ICD-10-CM

## 2022-09-20 DIAGNOSIS — F17.211 NICOTINE DEPENDENCE, CIGARETTES, IN REMISSION: ICD-10-CM

## 2022-09-20 DIAGNOSIS — M46.1 SACROILIITIS (HCC): ICD-10-CM

## 2022-09-20 DIAGNOSIS — E66.01 CLASS 2 SEVERE OBESITY DUE TO EXCESS CALORIES WITH SERIOUS COMORBIDITY AND BODY MASS INDEX (BMI) OF 35.0 TO 35.9 IN ADULT (HCC): ICD-10-CM

## 2022-09-20 DIAGNOSIS — F33.9 EPISODE OF RECURRENT MAJOR DEPRESSIVE DISORDER, UNSPECIFIED DEPRESSION EPISODE SEVERITY (HCC): ICD-10-CM

## 2022-09-20 DIAGNOSIS — Z59.9 FINANCIAL DIFFICULTIES: ICD-10-CM

## 2022-09-20 DIAGNOSIS — Z00.00 MEDICARE ANNUAL WELLNESS VISIT, SUBSEQUENT: Primary | ICD-10-CM

## 2022-09-20 PROCEDURE — 3725F SCREEN DEPRESSION PERFORMED: CPT | Performed by: INTERNAL MEDICINE

## 2022-09-20 PROCEDURE — 1003F LEVEL OF ACTIVITY ASSESS: CPT | Performed by: INTERNAL MEDICINE

## 2022-09-20 PROCEDURE — G0439 PPPS, SUBSEQ VISIT: HCPCS | Performed by: INTERNAL MEDICINE

## 2022-09-20 PROCEDURE — 1090F PRES/ABSN URINE INCON ASSESS: CPT | Performed by: INTERNAL MEDICINE

## 2022-09-20 PROCEDURE — 99214 OFFICE O/P EST MOD 30 MIN: CPT | Performed by: INTERNAL MEDICINE

## 2022-09-20 SDOH — ECONOMIC STABILITY - INCOME SECURITY: PROBLEM RELATED TO HOUSING AND ECONOMIC CIRCUMSTANCES, UNSPECIFIED: Z59.9

## 2022-09-20 NOTE — PROGRESS NOTES
Assessment and Plan:     Problem List Items Addressed This Visit        Endocrine    Type 2 diabetes mellitus with hyperglycemia, without long-term current use of insulin ([facility] [address] )       Lab Results   Component Value Date    HGBA1C 10 5 (A) 08/17/2022   I have counselled the pt to follow a healthy and balanced diet ,and recommend routine exercise  I will be ordering diabetic laboratories including comprehensive metabolic panel, hemoglobin A1c, urine microalbumin, lipid panel  See Ophthalmology for routine examination patient is very motivated at this point time making major progress         Relevant Orders    Ambulatory Referral to Ophthalmology    Hemoglobin A1C    Comprehensive metabolic panel    Microalbumin / creatinine urine ratio    Lipid Panel with Direct LDL reflex       Nervous and Auditory    Intervertebral disc disorder with radiculopathy of lumbar region     He has undergone physical therapy she reports me improving on her activity levels I did advise her start core muscle strengthening have given her a copy of exercises she reports me after 3000 steps she is starting to develop lower back pain and spasm            Musculoskeletal and Integument    Sacroiliitis (HCC)     Currently stable doing well            Other    Class 2 severe obesity due to excess calories with serious comorbidity and body mass index (BMI) of 35 0 to 35 9 in adult [facility])     Obesity -I have counseled patient following healthy and balanced diet, I would like the patient to lose weight, I would like the patient exercise routinely; we will continue monitor the patient's progress  Episode of recurrent major depressive disorder ([facility] [address] )     No SI significant improvements in symptoms she is making great progress continue with low-dose Cymbalta 20 mg once daily         Medicare annual wellness visit, subsequent - Primary     Assessment and plan 1    Medicare symptoms annual wellness examination overall the patient is clinically stable and doing well, we encouraged the patient to follow a healthy and balanced diet  We recommend that the patient exercise routinely approximately 30 minutes 5 times per week   We have reviewed the patient's vaccines and have made recommendations for updates if necessary  recommend annual flu shot and COVID booster      We will be ordering screening laboratories which are age appropriate  Return to the office in   3 month  call if any problems  Other Visit Diagnoses     Need for hepatitis C screening test        Relevant Orders    Hepatitis C Antibody ([facility])    Encounter for screening for lung cancer        Nicotine dependence, cigarettes, in remission        Financial difficulties        Relevant Orders    Ambulatory referral to social work care management program      Counseled to consider completing a living will RTO in 3 months call if any problems  BMI Counseling: Body mass index is 33 43 kg/m²  The BMI is above normal  Nutrition recommendations include decreasing portion sizes and moderation in carbohydrate intake  Exercise recommendations include exercising 3-5 times per week  Rationale for BMI follow-up plan is due to patient being overweight or obese  Preventive health issues were discussed with patient, and age appropriate screening tests were ordered as noted in patient's After Visit Summary  Personalized health advice and appropriate referrals for health education or preventive services given if needed, as noted in patient's After Visit Summary  History of Present Illness:     Patient presents for a Medicare Wellness Visit    HPI 64-year old female coming in for a follow up office visit regarding type 2 diabetes, sacroiliitis/intervertebral disc disorder with radiculopathy;, obesity, depression; The patient reports me compliant taking medications without untoward side effects the    The patient is here to review his medical condition, update me on the medical condition and the patient reports me no hospitalizations and no ER visits  Reports me significant improvements in her depression symptoms tolerating Cymbalta without any side effects significant improvements in overall symptoms no SI she reports me that she gets back pain at 3,000 steps, muscle spasm she gets lower back pain  Patient Care Team:  Severo Pock, DO as PCP - General (Internal Medicine)  IZABELA Love as PCP - PCP-[facility]-Abdulaziz Hooker MD (Pain Medicine)     Review of Systems:     Review of Systems   Constitutional: Negative for activity change, appetite change and unexpected weight change  HENT: Negative for congestion and postnasal drip  Eyes: Negative for visual disturbance  Respiratory: Negative for cough and shortness of breath  Cardiovascular: Negative for chest pain  Gastrointestinal: Negative for abdominal pain, diarrhea, nausea and vomiting  Musculoskeletal: Positive for back pain  Neurological: Negative for dizziness, light-headedness and headaches  Hematological: Negative for adenopathy  Psychiatric/Behavioral: Negative for suicidal ideas          Problem List:     Patient Active Problem List   Diagnosis    Type 2 diabetes mellitus with hyperglycemia, without long-term current use of insulin (HCC)    Spondylolisthesis at L3-L4 level    Sacroiliitis (HCC)    Lumbar spondylosis    Class 2 severe obesity due to excess calories with serious comorbidity and body mass index (BMI) of 35 0 to 35 9 in adult ([facility] )    Non-toxic multinodular goiter    Type 2 diabetes mellitus without complication, without long-term current use of insulin (HCC)    Anemia    Hip pain    Alcohol use    Mild anemia    Neuropathy    Acute cystitis without hematuria    Intervertebral disc disorder with radiculopathy of lumbar region    Lumbar radiculopathy    Closed compression fracture of L2 lumbar vertebra, initial encounter ([facility] )    Episode of recurrent major depressive disorder ([facility] [address] )    Medicare annual wellness visit, subsequent      Past Medical and Surgical History:     Past Medical History:   Diagnosis Date    Low back pain     Non-toxic multinodular goiter      Past Surgical History:   Procedure Laterality Date    STOMACH SURGERY      for morbid obesity / last assessed 9/10/12    US GUIDED THYROID BIOPSY      biopsy thyroid using percutaneous core needle     US GUIDED THYROID BIOPSY  2020      Family History:     Family History   Problem Relation Age of Onset    Cancer Mother     Depression Mother       Social History:     Social History     Socioeconomic History    Marital status: /Civil Union     Spouse name: None    Number of children: None    Years of education: None    Highest education level: None   Occupational History    None   Tobacco Use    Smoking status: Former Smoker     Packs/day: 1 50     Years: 38 00     Pack years: 57 00     Start date:      Quit date:      Years since quittin 7    Smokeless tobacco: Never Used   Vaping Use    Vaping Use: Never used   Substance and Sexual Activity    Alcohol use: Not Currently     Comment: social     Drug use: Never    Sexual activity: Not Currently   Other Topics Concern    None   Social History Narrative    Exercising regularly          Social Determinants of Health     Financial Resource Strain: High Risk    Difficulty of Paying Living Expenses: Hard   Food Insecurity: Not on file   Transportation Needs: No Transportation Needs    Lack of Transportation (Medical): No    Lack of Transportation (Non-Medical):  No   Physical Activity: Not on file   Stress: Not on file   Social Connections: Not on file   Intimate Partner Violence: Not on file   Housing Stability: Not on file      Medications and Allergies:     Current Outpatient Medications   Medication Sig Dispense Refill    DULoxetine (Cymbalta) 30 mg delayed release capsule Take 1 capsule (30 mg total) by mouth daily 30 capsule 1    Empagliflozin (Jardiance) 25 MG TABS Take 1 tablet (25 mg total) by mouth every morning For diabetes 90 tablet 1    glucose blood (OneTouch Verio) test strip use 1 TEST STRIP to TEST BLOOD SUGAR once daily 200 strip 1    Lancets (ONETOUCH DELICA PLUS [id number]) MISC 1 each by Does not apply route daily 100 each 1    LORazepam (ATIVAN) 0 5 mg tablet Take 1 tab 1 hours prior to procedure  Can take additional tab 30 minutes later if still anxious 2 tablet 0    DULoxetine (CYMBALTA) 20 mg capsule Take 1 tab PO daily with the  60 mg tab of cymbalta (Patient not taking: No sig reported) 30 capsule 5     No current facility-administered medications for this visit  No Known Allergies   Immunizations: There is no immunization history for the selected administration types on file for this patient  Health Maintenance:         Topic Date Due    Hepatitis C Screening  Never done    HIV Screening  Never done    Cervical Cancer Screening  Never done    Breast Cancer Screening: Mammogram  Never done    Colorectal Cancer Screening  Never done    Lung Cancer Screening  Never done         Topic Date Due    COVID-19 Vaccine (1) Never done    Influenza Vaccine (1) 09/01/2022      Medicare Screening Tests and Risk Assessments:     Anshu Jordan is here for her Subsequent Wellness visit  Health Risk Assessment:   Patient rates overall health as good  Patient feels that their physical health rating is same  Patient is very satisfied with their life  Eyesight was rated as same  Hearing was rated as same  Patient feels that their emotional and mental health rating is much better  Patients states they are never, rarely angry  Patient states they are often unusually tired/fatigued  Pain experienced in the last 7 days has been some  Patient's pain rating has been 7/10  Patient states that she has experienced no weight loss or gain in last 6 months  Fall Risk Screening:    In the past year, patient has experienced: no history of falling in past year      Urinary Incontinence Screening:   Patient has not leaked urine accidently in the last six months  Home Safety:  Patient does not have trouble with stairs inside or outside of their home  Patient has working smoke alarms and has working carbon monoxide detector  Home safety hazards include: none  Nutrition:   Current diet is Diabetic  Medications:   Patient is not currently taking any over-the-counter supplements  Patient is able to manage medications  They cost so much money    Activities of Daily Living (ADLs)/Instrumental Activities of Daily Living (IADLs):   Walk and transfer into and out of bed and chair?: Yes  Dress and groom yourself?: Yes    Bathe or shower yourself?: Yes    Feed yourself? Yes  Do your laundry/housekeeping?: Yes  Manage your money, pay your bills and track your expenses?: Yes  Make your own meals?: Yes    Do your own shopping?: Yes    Previous Hospitalizations:   Any hospitalizations or ED visits within the last 12 months?: No      Advance Care Planning:   Living will: No    Durable POA for healthcare: No    Advanced directive: No      PREVENTIVE SCREENINGS      Cardiovascular Screening:    General: Screening Current      Diabetes Screening:     General: Screening Not Indicated and History Diabetes    Screening, Brief Intervention, and Referral to Treatment (SBIRT)    Screening  Typical number of drinks in a day: 0  Typical number of drinks in a week: 0  Interpretation: Low risk drinking behavior      AUDIT-C Screenin) How often did you have a drink containing alcohol in the past year? never  2) How many drinks did you have on a typical day when you were drinking in the past year? 5 to 6  3) How often did you have 6 or more drinks on one occasion in the past year? weekly    AUDIT-C Score: 3  Interpretation: Score 3-12 (female): POSITIVE screen for alcohol misuse    AUDIT Screenin) How often during the last year have you found that you were not able to stop drinking once you had started? 0 - never  5) How often during the last year have you failed to do what was normally expected from you because of drinking? 0 - never  6) How often during the last year have you needed a first drink in the morning to get yourself going after a heavy drinking session? 0 - never  7) How often during the last year have you had a feeling of guilt or remorse after drinking? 0 - never  8) How often during the last year have you been unable to remember what happened the night before because you had been drinking? 1 - less than monthly  9) Have you or someone else been injured as a result of your drinking?  2 - yes, but not in the last year  8) Has a relative or friend or a doctor or another health worker been concerned about your drinking or suggested you cut down? 0 - no    AUDIT Score: 6  Interpretation: Low risk alcohol consumption    Single Item Drug Screening:  How often have you used an illegal drug (including marijuana) or a prescription medication for non-medical reasons in the past year? never    Single Item Drug Screen Score: 0  Interpretation: Negative screen for possible drug use disorder    No exam data present     Physical Exam:     /82   Pulse 84   Resp 16   Ht 5' 10" (1 778 m)   Wt 106 kg (233 lb)   SpO2 96%   BMI 33 43 kg/m²   PHQ-2/9 Depression Screening    Little interest or pleasure in doing things: 0 - not at all  Feeling down, depressed, or hopeless: 0 - not at all  Trouble falling or staying asleep, or sleeping too much: 0 - not at all  Feeling tired or having little energy: 0 - not at all  Poor appetite or overeatin - not at all  Feeling bad about yourself - or that you are a failure or have let yourself or your family down: 0 - not at all  Trouble concentrating on things, such as reading the newspaper or watching television: 0 - not at all  Moving or speaking so slowly that other people could have noticed  Or the opposite - being so fidgety or restless that you have been moving around a lot more than usual: 0 - not at all  Thoughts that you would be better off dead, or of hurting yourself in some way: 0 - not at all  PHQ-9 Score: 0   PHQ-9 Interpretation: No or Minimal depression        Physical Exam  Vitals and nursing note reviewed  Constitutional:       General: She is not in acute distress  Appearance: Normal appearance  She is well-developed  She is obese  She is not ill-appearing, toxic-appearing or diaphoretic  HENT:      Head: Normocephalic and atraumatic  Right Ear: External ear normal       Left Ear: External ear normal       Nose: Nose normal    Eyes:      Pupils: Pupils are equal, round, and reactive to light  Cardiovascular:      Rate and Rhythm: Normal rate and regular rhythm  Heart sounds: Normal heart sounds  No murmur heard  Pulmonary:      Effort: Pulmonary effort is normal       Breath sounds: Normal breath sounds  Abdominal:      General: There is no distension  Palpations: Abdomen is soft  Tenderness: There is no abdominal tenderness  There is no guarding  Neurological:      Mental Status: She is alert  Psychiatric:         Mood and Affect: Mood is not anxious or depressed  Thought Content: Thought content does not include suicidal ideation            Kayy Bowden,

## 2022-09-20 NOTE — PATIENT INSTRUCTIONS
Medicare Preventive Visit Patient Instructions  Thank you for completing your Welcome to Medicare Visit or Medicare Annual Wellness Visit today  Your next wellness visit will be due in one year (9/21/2023)  The screening/preventive services that you may require over the next 5-10 years are detailed below  Some tests may not apply to you based off risk factors and/or age  Screening tests ordered at today's visit but not completed yet may show as past due  Also, please note that scanned in results may not display below  Preventive Screenings:  Service Recommendations Previous Testing/Comments   Colorectal Cancer Screening  * Colonoscopy    * Fecal Occult Blood Test (FOBT)/Fecal Immunochemical Test (FIT)  * Fecal DNA/Cologuard Test  * Flexible Sigmoidoscopy Age: 39-70 years old   Colonoscopy: every 10 years (may be performed more frequently if at higher risk)  OR  FOBT/FIT: every 1 year  OR  Cologuard: every 3 years  OR  Sigmoidoscopy: every 5 years  Screening may be recommended earlier than age 39 if at higher risk for colorectal cancer  Also, an individualized decision between you and your healthcare provider will decide whether screening between the ages of 74-80 would be appropriate  Colonoscopy: Not on file  FOBT/FIT: Not on file  Cologuard: Not on file  Sigmoidoscopy: Not on file          Breast Cancer Screening Age: 36 years old  Frequency: every 1-2 years  Not required if history of left and right mastectomy Mammogram: Not on file        Cervical Cancer Screening Between the ages of 21-29, pap smear recommended once every 3 years  Between the ages of 33-67, can perform pap smear with HPV co-testing every 5 years     Recommendations may differ for women with a history of total hysterectomy, cervical cancer, or abnormal pap smears in past  Pap Smear: Not on file        Hepatitis C Screening Once for adults born between [facility]  More frequently in patients at high risk for Hepatitis C Hep C Antibody: Not on file        Diabetes Screening 1-2 times per year if you're at risk for diabetes or have pre-diabetes Fasting glucose: 183 mg/dL (9/17/2022)  A1C: 10 5 (8/17/2022)      Cholesterol Screening Once every 5 years if you don't have a lipid disorder  May order more often based on risk factors  Lipid panel: 09/17/2022          Other Preventive Screenings Covered by Medicare:  1  Abdominal Aortic Aneurysm (AAA) Screening: covered once if your at risk  You're considered to be at risk if you have a family history of AAA  2  Lung Cancer Screening: covers low dose CT scan once per year if you meet all of the following conditions: (1) Age 50-69; (2) No signs or symptoms of lung cancer; (3) Current smoker or have quit smoking within the last 15 years; (4) You have a tobacco smoking history of at least 20 pack years (packs per day multiplied by number of years you smoked); (5) You get a written order from a healthcare provider  3  Glaucoma Screening: covered annually if you're considered high risk: (1) You have diabetes OR (2) Family history of glaucoma OR (3)  aged 48 and older OR (3)  American aged 72 and older  3  Osteoporosis Screening: covered every 2 years if you meet one of the following conditions: (1) You're estrogen deficient and at risk for osteoporosis based off medical history and other findings; (2) Have a vertebral abnormality; (3) On glucocorticoid therapy for more than 3 months; (4) Have primary hyperparathyroidism; (5) On osteoporosis medications and need to assess response to drug therapy  · Last bone density test (DXA Scan): Not on file  5  HIV Screening: covered annually if you're between the age of 12-76  Also covered annually if you are younger than 13 and older than 72 with risk factors for HIV infection  For pregnant patients, it is covered up to 3 times per pregnancy      Immunizations:  Immunization Recommendations   Influenza Vaccine Annual influenza vaccination during flu season is recommended for all persons aged >= 6 months who do not have contraindications   Pneumococcal Vaccine   * Pneumococcal conjugate vaccine = PCV13 (Prevnar 13), PCV15 (Vaxneuvance), PCV20 (Prevnar 20)  * Pneumococcal polysaccharide vaccine = PPSV23 (Pneumovax) Adults 25-60 years old: 1-3 doses may be recommended based on certain risk factors  Adults 72 years old: 1-2 doses may be recommended based off what pneumonia vaccine you previously received   Hepatitis B Vaccine 3 dose series if at intermediate or high risk (ex: diabetes, end stage renal disease, liver disease)   Tetanus (Td) Vaccine - COST NOT COVERED BY MEDICARE PART B Following completion of primary series, a booster dose should be given every 10 years to maintain immunity against tetanus  Td may also be given as tetanus wound prophylaxis  Tdap Vaccine - COST NOT COVERED BY MEDICARE PART B Recommended at least once for all adults  For pregnant patients, recommended with each pregnancy  Shingles Vaccine (Shingrix) - COST NOT COVERED BY MEDICARE PART B  2 shot series recommended in those aged 48 and above     Health Maintenance Due:      Topic Date Due    Hepatitis C Screening  Never done    HIV Screening  Never done    Cervical Cancer Screening  Never done    Breast Cancer Screening: Mammogram  Never done    Colorectal Cancer Screening  Never done    Lung Cancer Screening  Never done     Immunizations Due:      Topic Date Due    COVID-19 Vaccine (1) Never done    Influenza Vaccine (1) 09/01/2022     Advance Directives   What are advance directives? Advance directives are legal documents that state your wishes and plans for medical care  These plans are made ahead of time in case you lose your ability to make decisions for yourself  Advance directives can apply to any medical decision, such as the treatments you want, and if you want to donate organs  What are the types of advance directives?   There are many types of advance directives, and each state has rules about how to use them  You may choose a combination of any of the following:  · Living will: This is a written record of the treatment you want  You can also choose which treatments you do not want, which to limit, and which to stop at a certain time  This includes surgery, medicine, IV fluid, and tube feedings  · Durable power of  for healthcare [facility]): This is a written record that states who you want to make healthcare choices for you when you are unable to make them for yourself  This person, called a proxy, is usually a family member or a friend  You may choose more than 1 proxy  · Do not resuscitate (DNR) order:  A DNR order is used in case your heart stops beating or you stop breathing  It is a request not to have certain forms of treatment, such as CPR  A DNR order may be included in other types of advance directives  · Medical directive: This covers the care that you want if you are in a coma, near death, or unable to make decisions for yourself  You can list the treatments you want for each condition  Treatment may include pain medicine, surgery, blood transfusions, dialysis, IV or tube feedings, and a ventilator (breathing machine)  · Values history: This document has questions about your views, beliefs, and how you feel and think about life  This information can help others choose the care that you would choose  Why are advance directives important? An advance directive helps you control your care  Although spoken wishes may be used, it is better to have your wishes written down  Spoken wishes can be misunderstood, or not followed  Treatments may be given even if you do not want them  An advance directive may make it easier for your family to make difficult choices about your care     Weight Management   Why it is important to manage your weight:  Being overweight increases your risk of health conditions such as heart disease, high blood pressure, type 2 diabetes, and certain types of cancer  It can also increase your risk for osteoarthritis, sleep apnea, and other respiratory problems  Aim for a slow, steady weight loss  Even a small amount of weight loss can lower your risk of health problems  How to lose weight safely:  A safe and healthy way to lose weight is to eat fewer calories and get regular exercise  You can lose up about 1 pound a week by decreasing the number of calories you eat by 500 calories each day  Healthy meal plan for weight management:  A healthy meal plan includes a variety of foods, contains fewer calories, and helps you stay healthy  A healthy meal plan includes the following:  · Eat whole-grain foods more often  A healthy meal plan should contain fiber  Fiber is the part of grains, fruits, and vegetables that is not broken down by your body  Whole-grain foods are healthy and provide extra fiber in your diet  Some examples of whole-grain foods are whole-wheat breads and pastas, oatmeal, brown rice, and bulgur  · Eat a variety of vegetables every day  Include dark, leafy greens such as spinach, kale, lolis greens, and mustard greens  Eat yellow and orange vegetables such as carrots, sweet potatoes, and winter squash  · Eat a variety of fruits every day  Choose fresh or canned fruit (canned in its own juice or light syrup) instead of juice  Fruit juice has very little or no fiber  · Eat low-fat dairy foods  Drink fat-free (skim) milk or 1% milk  Eat fat-free yogurt and low-fat cottage cheese  Try low-fat cheeses such as mozzarella and other reduced-fat cheeses  · Choose meat and other protein foods that are low in fat  Choose beans or other legumes such as split peas or lentils  Choose fish, skinless poultry (chicken or turkey), or lean cuts of red meat (beef or pork)  Before you cook meat or poultry, cut off any visible fat  · Use less fat and oil  Try baking foods instead of frying them   Add less fat, such as margarine, sour cream, regular salad dressing and mayonnaise to foods  Eat fewer high-fat foods  Some examples of high-fat foods include french fries, doughnuts, ice cream, and cakes  · Eat fewer sweets  Limit foods and drinks that are high in sugar  This includes candy, cookies, regular soda, and sweetened drinks  Exercise:  Exercise at least 30 minutes per day on most days of the week  Some examples of exercise include walking, biking, dancing, and swimming  You can also fit in more physical activity by taking the stairs instead of the elevator or parking farther away from stores  Ask your healthcare provider about the best exercise plan for you  © Copyright [employer] 2018 Information is for End User's use only and may not be sold, redistributed or otherwise used for commercial purposes  All illustrations and images included in CareNotes® are the copyrighted property of A D A M , Inc  or [address] [place] [address] [place]     Type 2 Diabetes Management for Adults   AMBULATORY CARE:   Type 2 diabetes  is a disease that affects how your body uses glucose (sugar)  Either your body cannot make enough insulin, or it cannot use the insulin correctly  It is important to keep diabetes controlled to prevent damage to your heart, blood vessels, and other organs  Have someone call your local emergency number (911 in the [facility] [facility]) if:   · You cannot be woken  · You have signs of diabetic ketoacidosis:     ? confusion, fatigue    ? vomiting    ? rapid heartbeat    ? fruity smelling breath    ? extreme thirst    ? dry mouth and skin    · You have any of the following signs of a heart attack:      ?  Squeezing, pressure, or pain in your chest    ? You may  also have any of the following:     § Discomfort or pain in your back, neck, jaw, stomach, or arm    § Shortness of breath    § Nausea or vomiting    § Lightheadedness or a sudden cold sweat    · You have any of the following signs of a stroke:      ? Numbness or drooping on one side of your face     ? Weakness in an arm or leg    ? Confusion or difficulty speaking    ? Dizziness, a severe headache, or vision loss    Call your doctor or diabetes care team if:   · You have a sore or wound that will not heal     · You have a change in the amount you urinate  · Your blood sugar levels are higher than your target goals  · You often have lower blood sugar levels than your target goals  · Your skin is red, dry, warm, or swollen  · You have trouble coping with diabetes, or you feel anxious or depressed  · You have questions or concerns about your condition or care  What you need to know about high blood sugar levels:  High blood sugar levels may not cause any symptoms  You may feel more thirsty or urinate more often than usual  Over time, high blood sugar levels can damage your nerves, blood vessels, tissues, and organs  The following can increase your blood sugar levels:  · Large meals or large amounts of carbohydrates at one time    · Less physical activity    · Stress    · Illness    · A lower dose of medicine or insulin, or a late dose    What you need to know about low blood sugar levels: You can prevent symptoms such as shakiness, dizziness, irritability, or confusion by preventing your blood sugar levels from going too low  · Treat a low blood sugar level right away  ? Drink 4 ounces of juice or have 1 tube of glucose gel  ? Check your blood sugar level again 10 to 15 minutes later  ? When the level goes back to normal, eat a meal or snack to prevent another decrease  · Keep glucose gel, raisins, or hard candy with you at all times to treat a low blood sugar level  · Your blood sugar level can get too low if you take diabetes medicine or insulin and do not eat enough food  · If you use insulin, check your blood sugar level before you exercise        ? If your blood sugar level is below 100 mg/dL, eat 4 crackers or 2 ounces of raisins, or drink 4 ounces of juice  ? Check your level every 30 minutes if you exercise more than 1 hour  ? You may need a snack during or after exercise  What you can do to manage your blood sugar levels:   · Check your blood sugar levels as directed and as needed  Several items are available to use to check your levels  You may need to check by testing a drop of blood in a glucose monitor  You may instead be given a continuous glucose monitoring (CGM) device  The device is worn at all times  The CGM checks your blood sugar level every 5 minutes  It sends results to an electronic device such as a smart phone  A CGM can be used with or without an insulin pump  Talk with your provider to find out which method is best for you  The goal for blood sugar levels before meals  is between 80 and 130 mg/dL and 2 hours after eating  is lower than 180 mg/dL  · Make healthy food choices  Work with a dietitian to develop a meal plan that works for you and your schedule  A dietitian can help you learn how to eat the right amount of carbohydrates during your meals and snacks  Carbohydrates can raise your blood sugar level if you eat too many at one time  Examples of foods that contain carbohydrates are breads, cereals, rice, pasta, and sweets  · Get regular physical activity  Physical activity can help you get to your target blood sugar level goal and manage your weight  Get at least 150 minutes of moderate to vigorous aerobic physical activity each week  Do not miss more than 2 days in a row  Do not sit longer than 30 minutes at a time  Your healthcare provider can help you create an activity plan  The plan can include the best activities for you and can help you build your strength and endurance  · Maintain a healthy weight  Ask your healthcare provider what a healthy weight is for you  Ask him or her to help you create a safe weight loss plan if you are overweight      · Take your diabetes medicine or insulin as directed  You may need diabetes medicine, insulin, or both to help control your blood sugar levels  Your healthcare provider will teach you how and when to take your diabetes medicine or insulin  You will also be taught about side effects oral diabetes medicine can cause  Insulin may be injected, or given through a pump or pen  You and your care team will discuss which method is best for you  ? An insulin pump  is an implanted device that gives your insulin 24 hours a day  An insulin pump prevents the need for multiple insulin injections in a day  ? An insulin pen  is a device prefilled with the right amount of insulin  ? You and your family members will be taught how to draw up and give insulin  if this is the best method for you  Your education team will also teach you how to dispose of needles and syringes  ? You will learn how much insulin you need  and when to give it  You will be taught when not to give insulin  You will also be taught what to do if your blood sugar level drops too low  This may happen if you take insulin and do not eat the right amount of carbohydrates  Other things you can do to manage type 2 diabetes:   · Wear medical alert identification  Wear medical alert jewelry or carry a card that says you have diabetes  Ask your provider where to get these items  · Do not smoke  Nicotine and other chemicals in cigarettes and cigars can cause lung and blood vessel damage  It also makes it more difficult to manage your diabetes  Ask your provider for information if you currently smoke and need help to quit  Do not use e-cigarettes or smokeless tobacco in place of cigarettes or to help you quit  They still contain nicotine  · Check your feet each day for cuts, scratches, calluses, or other wounds  Look for redness and swelling, and feel for warmth  Wear shoes that fit well  Check your shoes for rocks or other objects that can hurt your feet   Do not walk barefoot or wear shoes without socks  Wear cotton socks to help keep your feet dry  · Ask about vaccines you may need  You have a higher risk for serious illness if you get the flu, pneumonia, COVID-19, or hepatitis  Ask your provider if you should get vaccines to prevent these or other diseases, and when to get the vaccines  · Talk to your care team if you become stressed about diabetes care  Sometimes being able to fit diabetes care into your life can cause increased stress  The stress can cause you not to take care of yourself properly  Your care team can help by offering tips about self-care  Your care team may suggest you talk to a mental health provider  The provider can listen and offer help with self-care issues  Follow up with your doctor or diabetes care team as directed: You may need to have blood tests done before your follow-up visit  The test results will show if changes need to be made in your treatment or self-care  Write down your questions so you remember to ask them during your visits  Talk to your provider if you cannot afford your medicine  © Copyright [employer] 2022 Information is for End User's use only and may not be sold, redistributed or otherwise used for commercial purposes  All illustrations and images included in CareNotes® are the copyrighted property of [employer]  or Anita Traylor   The above information is an  only  It is not intended as medical advice for individual conditions or treatments  Talk to your doctor, nurse or pharmacist before following any medical regimen to see if it is safe and effective for you

## 2022-09-20 NOTE — ASSESSMENT & PLAN NOTE
Lab Results   Component Value Date    HGBA1C 10 5 (A) 08/17/2022   I have counselled the pt to follow a healthy and balanced diet ,and recommend routine exercise  I will be ordering diabetic laboratories including comprehensive metabolic panel, hemoglobin A1c, urine microalbumin, lipid panel    See Ophthalmology for routine examination patient is very motivated at this point time making major progress

## 2022-09-21 RX ORDER — BLOOD SUGAR DIAGNOSTIC
STRIP MISCELLANEOUS
Qty: 200 STRIP | Refills: 0 | Status: SHIPPED | OUTPATIENT
Start: 2022-09-21

## 2022-09-21 RX ORDER — DULOXETIN HYDROCHLORIDE 30 MG/1
30 CAPSULE, DELAYED RELEASE ORAL DAILY
Qty: 30 CAPSULE | Refills: 0 | Status: SHIPPED | OUTPATIENT
Start: 2022-09-21 | End: 2022-10-26 | Stop reason: SDUPTHER

## 2022-09-21 NOTE — ASSESSMENT & PLAN NOTE
Assessment and plan 1  Medicare symptoms annual wellness examination overall the patient is clinically stable and doing well, we encouraged the patient to follow a healthy and balanced diet  We recommend that the patient exercise routinely approximately 30 minutes 5 times per week   We have reviewed the patient's vaccines and have made recommendations for updates if necessary  recommend annual flu shot and COVID booster      We will be ordering screening laboratories which are age appropriate  Return to the office in   3 month  call if any problems

## 2022-09-21 NOTE — ASSESSMENT & PLAN NOTE
He has undergone physical therapy she reports me improving on her activity levels I did advise her start core muscle strengthening have given her a copy of exercises she reports me after 3000 steps she is starting to develop lower back pain and spasm

## 2022-09-21 NOTE — ASSESSMENT & PLAN NOTE
No SI significant improvements in symptoms she is making great progress continue with low-dose Cymbalta 20 mg once daily lower/lumbar

## 2022-09-22 ENCOUNTER — PATIENT OUTREACH (OUTPATIENT)
Dept: INTERNAL MEDICINE CLINIC | Facility: CLINIC | Age: 57
End: 2022-09-22

## 2022-09-22 NOTE — PROGRESS NOTES
[facility] SW responding to [facility] referral   Chart review completed  Phone call placed to patient who is not available  Message left with this SW contact information  Awaiting call back

## 2022-09-26 DIAGNOSIS — E11.65 TYPE 2 DIABETES MELLITUS WITH HYPERGLYCEMIA, WITHOUT LONG-TERM CURRENT USE OF INSULIN (HCC): ICD-10-CM

## 2022-09-29 ENCOUNTER — PATIENT OUTREACH (OUTPATIENT)
Dept: INTERNAL MEDICINE CLINIC | Facility: CLINIC | Age: 57
End: 2022-09-29

## 2022-09-29 NOTE — LETTER
[address] [id number]    Re: Cornelio Purchase to reach   9/29/2022       Dear Gal Ann am a [address]  and wanted to be certain you had information to contact me should you desire assistance with or have questions about non-medical aspects of your care such as [but not limited to] medical insurance, housing, transportation, material needs, or emergency needs  If I do not have an answer I will assist you in finding the appropriate agency or individual who can help  I have tried to reach you by phone to discuss these needs and have been unable to reach you  Please feel free to contact me at [phone]  Thank You      Sincerely,         BALAJI FletcherW

## 2022-09-29 NOTE — PROGRESS NOTES
[facility] SW placed 2nd outreach phone call to patient  Patient not available and message left  Unable to reach letter mailed

## 2022-10-06 ENCOUNTER — PATIENT OUTREACH (OUTPATIENT)
Dept: INTERNAL MEDICINE CLINIC | Facility: CLINIC | Age: 57
End: 2022-10-06

## 2022-10-06 NOTE — PROGRESS NOTES
[facility] SW has had no contact from patient despite phone calls and unable to reach letter  Referral closed for unable to reach patient

## 2022-10-26 ENCOUNTER — NEW PATIENT (OUTPATIENT)
Dept: URBAN - METROPOLITAN AREA CLINIC 6 | Facility: CLINIC | Age: 57
End: 2022-10-26

## 2022-10-26 ENCOUNTER — TELEPHONE (OUTPATIENT)
Dept: PAIN MEDICINE | Facility: CLINIC | Age: 57
End: 2022-10-26

## 2022-10-26 DIAGNOSIS — E11.9: ICD-10-CM

## 2022-10-26 DIAGNOSIS — F33.9 EPISODE OF RECURRENT MAJOR DEPRESSIVE DISORDER, UNSPECIFIED DEPRESSION EPISODE SEVERITY (HCC): ICD-10-CM

## 2022-10-26 DIAGNOSIS — Z96.1: ICD-10-CM

## 2022-10-26 DIAGNOSIS — E11.65 TYPE 2 DIABETES MELLITUS WITH HYPERGLYCEMIA, WITHOUT LONG-TERM CURRENT USE OF INSULIN (HCC): ICD-10-CM

## 2022-10-26 DIAGNOSIS — M79.18 MYOFASCIAL PAIN SYNDROME: ICD-10-CM

## 2022-10-26 LAB
LEFT EYE DIABETIC RETINOPATHY: NORMAL
RIGHT EYE DIABETIC RETINOPATHY: NORMAL

## 2022-10-26 PROCEDURE — 92004 COMPRE OPH EXAM NEW PT 1/>: CPT

## 2022-10-26 RX ORDER — DULOXETIN HYDROCHLORIDE 30 MG/1
30 CAPSULE, DELAYED RELEASE ORAL DAILY
Qty: 30 CAPSULE | Refills: 0 | Status: SHIPPED | OUTPATIENT
Start: 2022-10-26

## 2022-10-26 ASSESSMENT — VISUAL ACUITY
OS_SC: 20/40
OD_SC: 20/40
OD_PH: 20/30
OS_PH: 20/25

## 2022-10-26 ASSESSMENT — TONOMETRY
OD_IOP_MMHG: 20
OS_IOP_MMHG: 17

## 2022-10-27 NOTE — TELEPHONE ENCOUNTER
Please verify the dosing  I don't even see a 60mg prescribed for her   Only a 20mg and a 30mg which is strange

## 2022-10-27 NOTE — TELEPHONE ENCOUNTER
Pt rqst Cymbalta 20mg refill  Pt states taking Cymbalta 2 diff doses, also in [facility] LOV note  Cymbalta 30mg filled by PCP for depression and Cymbalta 60mg filled by [facility] for pain  LOV 12/30/21 w/ [facility]  Pt has no schd appt  Pls advise  Thank you

## 2022-10-31 RX ORDER — DULOXETIN HYDROCHLORIDE 60 MG/1
CAPSULE, DELAYED RELEASE ORAL
Qty: 90 CAPSULE | Refills: 3 | Status: SHIPPED | OUTPATIENT
Start: 2022-10-31 | End: 2022-11-03 | Stop reason: SDUPTHER

## 2022-10-31 NOTE — TELEPHONE ENCOUNTER
RN s/w pt and we confirmed that Rosie Mendez always prescribed her cymbalta 60 mg daily Q AM for her pain and Dr Damien Mercado has been prescribing her 30 mg daily Q AM for her depression  Told pt her Rx Request she sent to office was asking for duloxetine 20 mg capsules  Pt doesn't know why she sent that, she definitely needs the 60 mg strength to take daily in AM with her 30 mg strength  Pt has no 60 mg strength left  RN reviewed in Epic that Rosie Mendez had prescribed the 60 mg cymbalta for pt in the past on 12/30/21 and even prior to that  Per pt ok to send " My Chart Message" once cymbalta 60 mg has been sent

## 2022-11-01 ENCOUNTER — VBI (OUTPATIENT)
Dept: ADMINISTRATIVE | Facility: OTHER | Age: 57
End: 2022-11-01

## 2022-11-02 ENCOUNTER — VBI (OUTPATIENT)
Dept: ADMINISTRATIVE | Facility: OTHER | Age: 57
End: 2022-11-02

## 2022-11-03 ENCOUNTER — TELEPHONE (OUTPATIENT)
Dept: INTERNAL MEDICINE CLINIC | Facility: CLINIC | Age: 57
End: 2022-11-03

## 2022-11-03 RX ORDER — DULOXETIN HYDROCHLORIDE 60 MG/1
CAPSULE, DELAYED RELEASE ORAL
Qty: 90 CAPSULE | Refills: 3 | Status: SHIPPED | OUTPATIENT
Start: 2022-11-03

## 2022-11-03 NOTE — TELEPHONE ENCOUNTER
The "sig instructions" on the cymbalta 60 mg daily script you sent over should have read to "take 1 tab PO daily with the 30 mg tab of cymbalta " Your "sig" said take 1 tab daily with the 60 mg tab of cymbalta  NOEMII our office prescribes the cymbalta 60 mg daily and she takes that along with there cymbalta 30 mg daily that the PCP prescribes for her depression     Pls resend the cymbalta 60 mg script with the corrected "sig instruction "

## 2022-11-03 NOTE — TELEPHONE ENCOUNTER
Pharmacist Tracking Tool  Reason For Outreach: Embedded Pharmacist  Demographics:  Intervention Method: [place] Message  Type of Intervention: New  Topics Addressed: Diabetes  Pharmacologic Interventions: Medication Initiation  Non-Pharmacologic Interventions: Care coordination  Time:  Direct Patient Care: 5 mins  Care Coordination: 15 mins  Recommendation Recipient: Patient/Caregiver  Outcome: Pending/ Follow-up Required

## 2022-11-03 NOTE — TELEPHONE ENCOUNTER
Caller: Meghna Maloney    Doctor: Bernice Zepeda    Reason for call:  Meghna Maloney from [facility] called stating that the refill for the Cymbalta  60 mg capsules on 10/31 came over with conflicting directions-   Take 1 tab PO daily with the  60 mg tab of cymbalta         pls update and resend    Call back#:  [phone]  fx [phone]

## 2022-11-04 NOTE — TELEPHONE ENCOUNTER
Unable to update since determination was made  Will need to appeal  I will write letter indicating use and intolerance and send in to see if we can get an approval  Please let me know if there is any other  information you would like me to include  Thanks

## 2022-11-04 NOTE — TELEPHONE ENCOUNTER
RN sent My Chart message to the pt with message from  that moving forward either our office prescribes the cymbalta 30 mg and 60 mg or Dr Kelsie Borges does

## 2022-11-08 NOTE — TELEPHONE ENCOUNTER
GUERO received from patient that she has a few days left of Jardiance  Noted that MA staff submitted Jardiance appeal however no appeal determination noted per chart review  Will double check with staff  If Coni Phipps appeal is not available yet, will consider Jardiance samples to last until appeal determination is available

## 2022-11-09 NOTE — TELEPHONE ENCOUNTER
FYI,    I just checked our samples and we do not have samples of Jardiance 25 mg, only Jardiance 10 mg  Please advise

## 2022-11-09 NOTE — TELEPHONE ENCOUNTER
[address] [place] [address], Pharmacist    PCP: Alice Cruz    Support Staff: please get 4 week supply of Jardiance 25mg tablets ready for patient to   She will stop by office AM on 11/10 to    ____________________________________________________________________    Communication with patient: Yes, Phone call with patient    Informed patient we have not heard back from appeal  Patient has #1 tablet remaining of current supply, self-monitored blood glucose readings avg 120-130 while fbg readings usually 110  Informed patient that we will wait for Jardiance appeal  If appeal is denied, consider alternate SGLT2i      Pharmacist Tracking Tool  Reason For Outreach: Embedded Pharmacist    Demographics:  Intervention Method: Phone  Type of Intervention: Follow-Up  Topics Addressed: Diabetes  Pharmacologic Interventions: N/A  Non-Pharmacologic Interventions: Care coordination and Cost  Time:  Direct Patient Care: 5 mins   Care Coordination: 10 mins  Recommendation Recipient: Patient/Caregiver  Outcome: Accepted

## 2022-11-18 ENCOUNTER — VBI (OUTPATIENT)
Dept: ADMINISTRATIVE | Facility: OTHER | Age: 57
End: 2022-11-18

## 2022-11-18 ENCOUNTER — TELEPHONE (OUTPATIENT)
Dept: OTHER | Facility: OTHER | Age: 57
End: 2022-11-18

## 2022-11-18 NOTE — TELEPHONE ENCOUNTER
Patient asked the office to call her back to discuss what insurance she should get, what insurance Dr Aureliano Berman accept

## 2022-11-18 NOTE — TELEPHONE ENCOUNTER
11/18/22 1:18 PM     VB [employer] SmartForm used to document caregap status      Debra Kelley MA Hysterectomy with no documentation

## 2022-11-19 PROBLEM — Z00.00 MEDICARE ANNUAL WELLNESS VISIT, SUBSEQUENT: Status: RESOLVED | Noted: 2022-09-20 | Resolved: 2022-11-19

## 2022-11-22 ENCOUNTER — TELEPHONE (OUTPATIENT)
Dept: INTERNAL MEDICINE CLINIC | Facility: CLINIC | Age: 57
End: 2022-11-22

## 2022-11-25 DIAGNOSIS — F33.9 EPISODE OF RECURRENT MAJOR DEPRESSIVE DISORDER, UNSPECIFIED DEPRESSION EPISODE SEVERITY (HCC): ICD-10-CM

## 2022-11-28 RX ORDER — DULOXETIN HYDROCHLORIDE 30 MG/1
30 CAPSULE, DELAYED RELEASE ORAL DAILY
Qty: 30 CAPSULE | Refills: 0 | Status: SHIPPED | OUTPATIENT
Start: 2022-11-28

## 2022-11-29 ENCOUNTER — VBI (OUTPATIENT)
Dept: ADMINISTRATIVE | Facility: OTHER | Age: 57
End: 2022-11-29

## 2022-11-29 NOTE — TELEPHONE ENCOUNTER
11/29/22 8:43 AM     VB [employer] SmartForm used to document caregap status      Erick Patricia Addended by: DELBERT MARTINEZ on: 11/2/2021 06:05 PM     Modules accepted: Orders

## 2022-12-07 ENCOUNTER — VBI (OUTPATIENT)
Dept: ADMINISTRATIVE | Facility: OTHER | Age: 57
End: 2022-12-07

## 2022-12-22 ENCOUNTER — APPOINTMENT (OUTPATIENT)
Dept: LAB | Age: 57
End: 2022-12-22

## 2022-12-22 DIAGNOSIS — E11.65 TYPE 2 DIABETES MELLITUS WITH HYPERGLYCEMIA, WITHOUT LONG-TERM CURRENT USE OF INSULIN (HCC): ICD-10-CM

## 2022-12-22 DIAGNOSIS — Z11.59 NEED FOR HEPATITIS C SCREENING TEST: ICD-10-CM

## 2022-12-22 LAB
ALBUMIN SERPL BCP-MCNC: 3.8 G/DL (ref 3.5–5)
ALP SERPL-CCNC: 112 U/L (ref 46–116)
ALT SERPL W P-5'-P-CCNC: 35 U/L (ref 12–78)
ANION GAP SERPL CALCULATED.3IONS-SCNC: 6 MMOL/L (ref 4–13)
AST SERPL W P-5'-P-CCNC: 19 U/L (ref 5–45)
BILIRUB SERPL-MCNC: 0.5 MG/DL (ref 0.2–1)
BUN SERPL-MCNC: 10 MG/DL (ref 5–25)
CALCIUM SERPL-MCNC: 9.1 MG/DL (ref 8.3–10.1)
CHLORIDE SERPL-SCNC: 103 MMOL/L (ref 96–108)
CHOLEST SERPL-MCNC: 173 MG/DL
CO2 SERPL-SCNC: 28 MMOL/L (ref 21–32)
CREAT SERPL-MCNC: 0.56 MG/DL (ref 0.6–1.3)
CREAT UR-MCNC: 90.9 MG/DL
EST. AVERAGE GLUCOSE BLD GHB EST-MCNC: 200 MG/DL
GFR SERPL CREATININE-BSD FRML MDRD: 104 ML/MIN/1.73SQ M
GLUCOSE P FAST SERPL-MCNC: 166 MG/DL (ref 65–99)
HBA1C MFR BLD: 8.6 %
HCV AB SER QL: NORMAL
HDLC SERPL-MCNC: 44 MG/DL
LDLC SERPL CALC-MCNC: 109 MG/DL (ref 0–100)
MICROALBUMIN UR-MCNC: 5.3 MG/L (ref 0–20)
MICROALBUMIN/CREAT 24H UR: 6 MG/G CREATININE (ref 0–30)
POTASSIUM SERPL-SCNC: 4.5 MMOL/L (ref 3.5–5.3)
PROT SERPL-MCNC: 7.5 G/DL (ref 6.4–8.4)
SODIUM SERPL-SCNC: 137 MMOL/L (ref 135–147)
TRIGL SERPL-MCNC: 98 MG/DL

## 2022-12-27 ENCOUNTER — OFFICE VISIT (OUTPATIENT)
Dept: INTERNAL MEDICINE CLINIC | Facility: CLINIC | Age: 57
End: 2022-12-27

## 2022-12-27 VITALS
DIASTOLIC BLOOD PRESSURE: 78 MMHG | WEIGHT: 226 LBS | SYSTOLIC BLOOD PRESSURE: 122 MMHG | BODY MASS INDEX: 32.35 KG/M2 | RESPIRATION RATE: 16 BRPM | HEIGHT: 70 IN | OXYGEN SATURATION: 97 % | HEART RATE: 87 BPM

## 2022-12-27 DIAGNOSIS — M43.16 SPONDYLOLISTHESIS AT L3-L4 LEVEL: ICD-10-CM

## 2022-12-27 DIAGNOSIS — E66.01 CLASS 2 SEVERE OBESITY DUE TO EXCESS CALORIES WITH SERIOUS COMORBIDITY AND BODY MASS INDEX (BMI) OF 35.0 TO 35.9 IN ADULT (HCC): ICD-10-CM

## 2022-12-27 DIAGNOSIS — Z13.820 SCREENING FOR OSTEOPOROSIS: ICD-10-CM

## 2022-12-27 DIAGNOSIS — F17.211 NICOTINE DEPENDENCE, CIGARETTES, IN REMISSION: ICD-10-CM

## 2022-12-27 DIAGNOSIS — L84 CALLUS: ICD-10-CM

## 2022-12-27 DIAGNOSIS — F33.0 MILD EPISODE OF RECURRENT MAJOR DEPRESSIVE DISORDER (HCC): ICD-10-CM

## 2022-12-27 DIAGNOSIS — Z12.31 ENCOUNTER FOR SCREENING MAMMOGRAM FOR BREAST CANCER: ICD-10-CM

## 2022-12-27 DIAGNOSIS — Z23 ENCOUNTER FOR IMMUNIZATION: ICD-10-CM

## 2022-12-27 DIAGNOSIS — E11.65 TYPE 2 DIABETES MELLITUS WITH HYPERGLYCEMIA, WITHOUT LONG-TERM CURRENT USE OF INSULIN (HCC): ICD-10-CM

## 2022-12-27 DIAGNOSIS — Z12.4 SCREENING FOR CERVICAL CANCER: ICD-10-CM

## 2022-12-27 DIAGNOSIS — M46.1 SACROILIITIS (HCC): Primary | ICD-10-CM

## 2022-12-27 DIAGNOSIS — Z12.2 ENCOUNTER FOR SCREENING FOR LUNG CANCER: ICD-10-CM

## 2022-12-27 NOTE — PROGRESS NOTES
Patient's shoes and socks removed  Right Foot/Ankle   Right Foot Inspection  Skin Exam: skin normal and skin intact  No dry skin, no warmth, no callus, no erythema, no maceration, no abnormal color, no pre-ulcer, no ulcer and no callus  Toe Exam: ROM and strength within normal limits  Sensory   Monofilament testing: intact    Vascular  Capillary refills: < 3 seconds  The right DP pulse is 2+  The right PT pulse is 2+  Left Foot/Ankle  Left Foot Inspection  Skin Exam: skin normal and skin intact  No dry skin, no warmth, no erythema, no maceration, normal color, no pre-ulcer, no ulcer and no callus  Toe Exam: ROM and strength within normal limits  Sensory   Monofilament testing: intact    Vascular  Capillary refills: < 3 seconds  The left PT pulse is 2+  Assign Risk Category  No deformity present  No loss of protective sensation  No weak pulses  Risk: 0  Mild noninfected callus of the foot      Assessment/Plan:    Type 2 diabetes mellitus with hyperglycemia, without long-term current use of insulin ([facility] )  I have counselled the pt to follow a healthy and balanced diet ,and recommend routine exercise  Target A1c under 7 I will be ordering diabetic laboratories including comprehensive metabolic panel, hemoglobin A1c, urine microalbumin, lipid panel  Patient is very motivated with diet/exercise she is making significant progress with her weight  Lab Results   Component Value Date    HGBA1C 8 6 (H) 12/22/2022       Class 2 severe obesity due to excess calories with serious comorbidity and body mass index (BMI) of 35 0 to 35 9 in [facility])  Obesity -I have counseled patient following healthy and balanced diet, I would like the patient to lose weight, I would like the patient exercise routinely; we will continue monitor the patient's progress  Episode of recurrent major depressive disorder ([facility] )  Clinically stable and doing well continue the current medical regiment will continue monitor  Continue Cymbalta no SI         Problem List Items Addressed This Visit        Endocrine    Type 2 diabetes mellitus with hyperglycemia, without long-term current use of insulin ([facility] )     I have counselled the pt to follow a healthy and balanced diet ,and recommend routine exercise  Target A1c under 7 I will be ordering diabetic laboratories including comprehensive metabolic panel, hemoglobin A1c, urine microalbumin, lipid panel  Patient is very motivated with diet/exercise she is making significant progress with her weight  Lab Results   Component Value Date    HGBA1C 8 6 (H) 12/22/2022            Relevant Orders    Comprehensive metabolic panel    Hemoglobin A1C    Lipid Panel with Direct LDL reflex    Microalbumin / creatinine urine ratio       Musculoskeletal and Integument    Spondylolisthesis at L3-L4 level    Sacroiliitis (HCC) - Primary       Other    Class 2 severe obesity due to excess calories with serious comorbidity and body mass index (BMI) of 35 0 to 35 9 in [facility])     Obesity -I have counseled patient following healthy and balanced diet, I would like the patient to lose weight, I would like the patient exercise routinely; we will continue monitor the patient's progress  Episode of recurrent major depressive disorder ([facility] )     Clinically stable and doing well continue the current medical regiment will continue monitor  Continue Cymbalta no SI        Other Visit Diagnoses     Screening for cervical cancer        Encounter for screening mammogram for breast cancer        Encounter for screening for lung cancer        Nicotine dependence, cigarettes, in remission        Encounter for immunization        Callus        Relevant Orders    Ambulatory Referral to Podiatry    Screening for osteoporosis        Relevant Orders    DXA bone density spine hip and pelvis          Return to office 6  months  call if any problems  Subjective:      Patient ID: Susi Wilkinson is a 65 y o  female  HPI 64-year old female coming in for a follow up office visit regarding type 2 diabetes, obesity, depression callus of the foot, chronic lower back pain; the patient reports me compliant taking medications without untoward side effects the  The patient is here to review his medical condition, update me on the medical condition and the patient reports me no hospitalizations and no ER visits  No injuries no illnesses mood is doing really good very motivated to continue with healthy/balanced diet making great progress on her weight he has made significant changes in her diet here to review laboratories in details she cut down coffee to 2 cups , cut down on rasin bran ,     The following portions of the patient's history were reviewed and updated as appropriate: allergies, current medications, past family history, past medical history, past social history, past surgical history and problem list     Review of Systems   Constitutional: Negative for activity change, appetite change and unexpected weight change  HENT: Negative for congestion and postnasal drip  Eyes: Negative for visual disturbance  Respiratory: Negative for cough and shortness of breath  Cardiovascular: Negative for chest pain  Gastrointestinal: Negative for abdominal pain, diarrhea, nausea and vomiting  Neurological: Negative for dizziness, light-headedness and headaches  Hematological: Negative for adenopathy  Objective:    Return in about 6 months (around 6/27/2023)  No results found        No Known Allergies    Past Medical History:   Diagnosis Date   • Low back pain    • Non-toxic multinodular goiter      Past Surgical History:   Procedure Laterality Date   • STOMACH SURGERY      for morbid obesity / last assessed 9/10/12   • US GUIDED THYROID BIOPSY      biopsy thyroid using percutaneous core needle    • US GUIDED THYROID BIOPSY  7/1/2020     Current Outpatient Medications on File Prior to Visit   Medication Sig Dispense Refill   • DULoxetine (Cymbalta) 30 mg delayed release capsule Take 1 capsule (30 mg total) by mouth daily 30 capsule 0   • DULoxetine (CYMBALTA) 60 mg delayed release capsule Take 1 tab PO daily with the  30 mg tab of cymbalta 90 capsule 3   • Empagliflozin (Jardiance) 25 MG TABS Take 1 tablet (25 mg total) by mouth every morning For diabetes 90 tablet 0   • glucose blood (OneTouch Verio) test strip use 1 TEST STRIP to TEST BLOOD SUGAR once daily 200 strip 0   • Lancets (ONETOUCH DELICA PLUS [id number]) MISC 1 each by Does not apply route daily 100 each 1     No current facility-administered medications on file prior to visit  Family History   Problem Relation Age of Onset   • Cancer Mother    • Depression Mother      Social History     Socioeconomic History   • Marital status: /Civil Union     Spouse name: Not on file   • Number of children: Not on file   • Years of education: Not on file   • Highest education level: Not on file   Occupational History   • Not on file   Tobacco Use   • Smoking status: Former     Packs/day: 1 50     Years: 38 00     Pack years: 57 00     Types: Cigarettes     Start date: 37     Quit date: 2018     Years since quittin 0   • Smokeless tobacco: Never   Vaping Use   • Vaping Use: Never used   Substance and Sexual Activity   • Alcohol use: Not Currently     Comment: social    • Drug use: Never   • Sexual activity: Not Currently   Other Topics Concern   • Not on file   Social History Narrative    Exercising regularly          Social Determinants of Health     Financial Resource Strain: High Risk   • Difficulty of Paying Living Expenses: Hard   Food Insecurity: Not on file   Transportation Needs: No Transportation Needs   • Lack of Transportation (Medical): No   • Lack of Transportation (Non-Medical):  No   Physical Activity: Not on file   Stress: Not on file   Social Connections: Not on file   Intimate Partner Violence: Not on file   Housing Stability: Not on file Vitals:    12/27/22 1404   BP: 122/78   Pulse: 87   Resp: 16   SpO2: 97%   Weight: 103 kg (226 lb)   Height: 5' 10" (1 778 m)     Results for orders placed or performed in visit on 12/22/22   Hepatitis C Antibody ([facility])   Result Value Ref Range    Hepatitis C Ab Non-reactive Non-reactive   Hemoglobin A1C   Result Value Ref Range    Hemoglobin A1C 8 6 (H) Normal 3 8-5 6%; PreDiabetic 5 7-6 4%; Diabetic >=6 5%; Glycemic control for adults with diabetes <7 0% %     mg/dl   Comprehensive metabolic panel   Result Value Ref Range    Sodium 137 135 - 147 mmol/L    Potassium 4 5 3 5 - 5 3 mmol/L    Chloride 103 96 - 108 mmol/L    CO2 28 21 - 32 mmol/L    ANION GAP 6 4 - 13 mmol/L    BUN 10 5 - 25 mg/dL    Creatinine 0 56 (L) 0 60 - 1 30 mg/dL    Glucose, Fasting 166 (H) 65 - 99 mg/dL    Calcium 9 1 8 3 - 10 1 mg/dL    AST 19 5 - 45 U/L    ALT 35 12 - 78 U/L    Alkaline Phosphatase 112 46 - 116 U/L    Total Protein 7 5 6 4 - 8 4 g/dL    Albumin 3 8 3 5 - 5 0 g/dL    Total Bilirubin 0 50 0 20 - 1 00 mg/dL    eGFR 104 ml/min/1 73sq m   Microalbumin / creatinine urine ratio   Result Value Ref Range    Creatinine, Ur 90 9 mg/dL    Microalbum  ,U,Random 5 3 0 0 - 20 0 mg/L    Microalb Creat Ratio 6 0 - 30 mg/g creatinine   Lipid Panel with Direct LDL reflex   Result Value Ref Range    Cholesterol 173 See Comment mg/dL    Triglycerides 98 See Comment mg/dL    HDL, Direct 44 (L) >=50 mg/dL    LDL Calculated 109 (H) 0 - 100 mg/dL     Weight (last 2 days)     None        Body mass index is 32 43 kg/m²  BP      Temp      Pulse     Resp      SpO2        Vitals:    12/27/22 1404   Weight: 103 kg (226 lb)     Vitals:    12/27/22 1404   Weight: 103 kg (226 lb)       /78   Pulse 87   Resp 16   Ht 5' 10" (1 778 m)   Wt 103 kg (226 lb)   SpO2 97%   BMI 32 43 kg/m²          Physical Exam  Vitals and nursing note reviewed  Constitutional:       General: She is not in acute distress       Appearance: She is well-developed  She is obese  She is not ill-appearing, toxic-appearing or diaphoretic  HENT:      Head: Normocephalic  Eyes:      General: No scleral icterus  Right eye: No discharge  Left eye: No discharge  Conjunctiva/sclera: Conjunctivae normal       Pupils: Pupils are equal, round, and reactive to light  Cardiovascular:      Rate and Rhythm: Normal rate and regular rhythm  Pulses: no weak pulses          Dorsalis pedis pulses are 2+ on the right side  Posterior tibial pulses are 2+ on the right side and 2+ on the left side  Heart sounds: Normal heart sounds  No murmur heard  No friction rub  No gallop  Pulmonary:      Effort: No respiratory distress  Breath sounds: Normal breath sounds  No wheezing or rales  Abdominal:      General: Bowel sounds are normal  There is no distension  Palpations: Abdomen is soft  There is no mass  Tenderness: There is no abdominal tenderness  There is no guarding or rebound  Musculoskeletal:         General: No deformity  Cervical back: Neck supple  Feet:      Right foot:      Skin integrity: No ulcer, skin breakdown, erythema, warmth, callus or dry skin  Left foot:      Skin integrity: No ulcer, skin breakdown, erythema, warmth, callus or dry skin  Lymphadenopathy:      Cervical: No cervical adenopathy  Neurological:      Mental Status: She is alert  Coordination: Coordination normal    Psychiatric:         Mood and Affect: Mood is not anxious or depressed  Thought Content: Thought content does not include suicidal ideation

## 2023-01-03 NOTE — ASSESSMENT & PLAN NOTE
I have counselled the pt to follow a healthy and balanced diet ,and recommend routine exercise  Target A1c under 7 I will be ordering diabetic laboratories including comprehensive metabolic panel, hemoglobin A1c, urine microalbumin, lipid panel    Patient is very motivated with diet/exercise she is making significant progress with her weight  Lab Results   Component Value Date    HGBA1C 8 6 (H) 12/22/2022

## 2023-01-03 NOTE — ASSESSMENT & PLAN NOTE
Clinically stable and doing well continue the current medical regiment will continue monitor    Continue Cymbalta no SI

## 2023-01-09 ENCOUNTER — OFFICE VISIT (OUTPATIENT)
Dept: PODIATRY | Facility: CLINIC | Age: 58
End: 2023-01-09

## 2023-01-09 VITALS
BODY MASS INDEX: 31.92 KG/M2 | DIASTOLIC BLOOD PRESSURE: 101 MMHG | WEIGHT: 223 LBS | HEIGHT: 70 IN | HEART RATE: 84 BPM | OXYGEN SATURATION: 98 % | SYSTOLIC BLOOD PRESSURE: 155 MMHG

## 2023-01-09 DIAGNOSIS — L03.115 CELLULITIS OF FOOT, RIGHT: Primary | ICD-10-CM

## 2023-01-09 DIAGNOSIS — L97.511 DIABETIC ULCER OF RIGHT FOOT ASSOCIATED WITH DIABETES MELLITUS DUE TO UNDERLYING CONDITION, LIMITED TO BREAKDOWN OF SKIN, UNSPECIFIED PART OF FOOT (HCC): ICD-10-CM

## 2023-01-09 DIAGNOSIS — E08.621 DIABETIC ULCER OF RIGHT FOOT ASSOCIATED WITH DIABETES MELLITUS DUE TO UNDERLYING CONDITION, LIMITED TO BREAKDOWN OF SKIN, UNSPECIFIED PART OF FOOT (HCC): ICD-10-CM

## 2023-01-09 RX ORDER — CEPHALEXIN 500 MG/1
500 CAPSULE ORAL EVERY 8 HOURS SCHEDULED
Qty: 30 CAPSULE | Refills: 0 | Status: SHIPPED | OUTPATIENT
Start: 2023-01-09 | End: 2023-01-18 | Stop reason: ALTCHOICE

## 2023-01-09 NOTE — PROGRESS NOTES
Assessment/Plan:    The patient's clinical examination today is consistent with a Mcneill grade 1 ulceration of the plantar right forefoot with callused wound margins  The wound was sharply debrided with a sterile 15 blade of callus tissue  The wound was then cleansed with Betadine  A dressing was applied consisting of triple antibiotic ointment with a dry sterile dressing  The patient will continue wound care with the same materials at home  I have started her on a course of cephalexin 500 mg to be taken 3 times a day for 10 days  Recommend follow-up in 7 to 10 days  Diagnoses and all orders for this visit:    Cellulitis of foot, right  -     cephalexin (KEFLEX) 500 mg capsule; Take 1 capsule (500 mg total) by mouth every 8 (eight) hours for 10 days    Diabetic ulcer of right foot associated with diabetes mellitus due to underlying condition, limited to breakdown of skin, unspecified part of foot ([facility] )    Other orders  -     Debridement          Subjective:      Patient ID: Estefany Serna is a 65 y o  female  The patient presents today for her initial consultation with [facility] podiatry with a chief complaint of a right foot callus and infection  She noted about 2 to 3 days ago that there was a dark pigmentation to the callus lesion and she proceeded to debride it at home  States that there was foul-smelling brownish drainage admitted from the callus site  She has been dressing with triple antibiotic ointment and a Band-Aid  She notes some mild discomfort to the ulceration site  She denies any numbness or tingling sensations to her feet  She does note that she has diabetic and is currently taking Jardiance        The following portions of the patient's history were reviewed and updated as appropriate: allergies, current medications, past family history, past medical history, past social history, past surgical history and problem list       PAST MEDICAL HISTORY:  Past Medical History: Diagnosis Date   • Low back pain    • Non-toxic multinodular goiter        PAST SURGICAL HISTORY:  Past Surgical History:   Procedure Laterality Date   • STOMACH SURGERY      for morbid obesity / last assessed 9/10/12   • US GUIDED THYROID BIOPSY      biopsy thyroid using percutaneous core needle    • US GUIDED THYROID BIOPSY  2020        ALLERGIES:  Patient has no known allergies  MEDICATIONS:  Current Outpatient Medications   Medication Sig Dispense Refill   • cephalexin (KEFLEX) 500 mg capsule Take 1 capsule (500 mg total) by mouth every 8 (eight) hours for 10 days 30 capsule 0   • DULoxetine (Cymbalta) 30 mg delayed release capsule Take 1 capsule (30 mg total) by mouth daily 90 capsule 2   • DULoxetine (CYMBALTA) 60 mg delayed release capsule Take 1 tab PO daily with the  30 mg tab of cymbalta 90 capsule 3   • Empagliflozin (Jardiance) 25 MG TABS Take 1 tablet (25 mg total) by mouth every morning For diabetes 90 tablet 0   • glucose blood (OneTouch Verio) test strip use 1 TEST STRIP to TEST BLOOD SUGAR once daily 200 strip 0   • Lancets (ONETOUCH DELICA PLUS [id number]) MISC 1 each by Does not apply route daily 100 each 1     No current facility-administered medications for this visit         SOCIAL HISTORY:  Social History     Socioeconomic History   • Marital status: /Civil Union     Spouse name: None   • Number of children: None   • Years of education: None   • Highest education level: None   Occupational History   • None   Tobacco Use   • Smoking status: Former     Packs/day: 1 50     Years: 38 00     Pack years: 57 00     Types: Cigarettes     Start date: 37     Quit date: 2018     Years since quittin 0   • Smokeless tobacco: Never   Vaping Use   • Vaping Use: Never used   Substance and Sexual Activity   • Alcohol use: Not Currently     Comment: social    • Drug use: Never   • Sexual activity: Not Currently   Other Topics Concern   • None   Social History Narrative    Exercising regularly          Social Determinants of Health     Financial Resource Strain: High Risk   • Difficulty of Paying Living Expenses: Hard   Food Insecurity: Not on file   Transportation Needs: No Transportation Needs   • Lack of Transportation (Medical): No   • Lack of Transportation (Non-Medical): No   Physical Activity: Not on file   Stress: Not on file   Social Connections: Not on file   Intimate Partner Violence: Not on file   Housing Stability: Not on file        Review of Systems   Constitutional: Negative  HENT: Negative  Eyes: Negative  Respiratory: Negative  Cardiovascular: Negative  Endocrine: Negative  Musculoskeletal: Negative  Neurological: Negative  Hematological: Negative  Psychiatric/Behavioral: Negative  Objective:      BP (!) 155/101 (BP Location: Left arm, Patient Position: Sitting, Cuff Size: Standard)   Pulse 84   Ht 5' 10" (1 778 m)   Wt 101 kg (223 lb)   SpO2 98%   BMI 32 00 kg/m²          Physical Exam  Constitutional:       Appearance: Normal appearance  HENT:      Head: Normocephalic and atraumatic  Nose: Nose normal    Cardiovascular:      Pulses:           Dorsalis pedis pulses are 2+ on the right side  Posterior tibial pulses are 1+ on the right side  Pulmonary:      Effort: Pulmonary effort is normal    Feet:      Comments: There is a superficial wound to the plantar aspect of her right first metatarsophalangeal joint with callused margins; there is mild localized erythema and calor consistent with a localized cellulitis, there is no active drainage no purulence; there are no deep tracts or sinuses  Skin:     General: Skin is warm  Capillary Refill: Capillary refill takes less than 2 seconds  Neurological:      General: No focal deficit present  Mental Status: She is alert and oriented to person, place, and time     Psychiatric:         Mood and Affect: Mood normal          Behavior: Behavior normal          Thought Content: Thought content normal            Debridement   Universal Protocol:  Consent: Verbal consent obtained  Risks and benefits: risks, benefits and alternatives were discussed  Consent given by: patient  Time out: Immediately prior to procedure a "time out" was called to verify the correct patient, procedure, equipment, support staff and site/side marked as required  Timeout called at: 1/9/2023 8:40 AM   Patient understanding: patient states understanding of the procedure being performed  Patient consent: the patient's understanding of the procedure matches consent given  Patient identity confirmed: verbally with patient and provided demographic data      Performed by: physician  Debridement type: selective  Pain control: none  Pre-debridement measurements  Length (cm): 1 2  Width (cm): 1 1  Surface Area (cm^2): 1 32      Post-debridement measurements  Length (cm): 1 2  Width (cm): 1 1  Depth (cm): 0 1  Percent debrided: 100%  Surface Area (cm^2): 1 32  Area debrided (cm^2): 1 32  Volume (cm^3): 0 13  Devitalized tissue debrided: callus and slough  Instrument(s) utilized: blade  Bleeding: small  Hemostasis obtained with: silver nitrate  Procedural pain (0-10): insensate  Post-procedural pain: insensate   Response to treatment: procedure was tolerated well  Debridement Comments: The plantar right forefoot ulceration beneath the first metatarsophalangeal joint was sharply debrided with a sterile 15 blade of callus tissue and slough and with the wound base; a small area of bleeding postdebridement was cauterized utilizing silver nitrate  Start daily local wound care with triple antibiotic ointment and a dry sterile dressing  She will also need antibiotic therapy

## 2023-01-18 ENCOUNTER — OFFICE VISIT (OUTPATIENT)
Dept: PODIATRY | Facility: CLINIC | Age: 58
End: 2023-01-18

## 2023-01-18 VITALS
OXYGEN SATURATION: 97 % | SYSTOLIC BLOOD PRESSURE: 144 MMHG | DIASTOLIC BLOOD PRESSURE: 88 MMHG | BODY MASS INDEX: 31.67 KG/M2 | WEIGHT: 221.2 LBS | HEART RATE: 123 BPM | HEIGHT: 70 IN

## 2023-01-18 DIAGNOSIS — E08.621 DIABETIC ULCER OF TOE OF RIGHT FOOT ASSOCIATED WITH DIABETES MELLITUS DUE TO UNDERLYING CONDITION, LIMITED TO BREAKDOWN OF SKIN (HCC): ICD-10-CM

## 2023-01-18 DIAGNOSIS — L97.511 DIABETIC ULCER OF TOE OF RIGHT FOOT ASSOCIATED WITH DIABETES MELLITUS DUE TO UNDERLYING CONDITION, LIMITED TO BREAKDOWN OF SKIN (HCC): ICD-10-CM

## 2023-01-18 DIAGNOSIS — L03.115 CELLULITIS OF RIGHT FOOT: Primary | ICD-10-CM

## 2023-01-18 RX ORDER — FLUCONAZOLE 150 MG/1
150 TABLET ORAL ONCE
Qty: 1 TABLET | Refills: 0 | Status: SHIPPED | OUTPATIENT
Start: 2023-01-18 | End: 2023-01-18

## 2023-01-18 RX ORDER — AMOXICILLIN AND CLAVULANATE POTASSIUM 875; 125 MG/1; MG/1
1 TABLET, FILM COATED ORAL EVERY 12 HOURS SCHEDULED
Qty: 20 TABLET | Refills: 0 | Status: SHIPPED | OUTPATIENT
Start: 2023-01-18 | End: 2023-01-28

## 2023-01-18 NOTE — PROGRESS NOTES
Assessment/Plan:     The plantar right foot wound was sharply debrided with a sterile 15 blade of callus tissue and fibrous eschar  A small superficial wound measuring about 3 mm x 5 mm x 1 mm was noted beneath the first metatarsal phalangeal joint  Erythema is much improved but still persistent  Discontinue Keflex  Start her on Augmentin 875 twice daily for 10 days  The patient did note some concerns for a fulminating yeast infection while on cephalexin, I have ordered a single dose of Diflucan 150 mg for this  Recommend follow-up in 10 to 14 days  Continue daily local wound care with triple antibiotic ointment and a dry sterile dressing to the right foot wound site  Diagnoses and all orders for this visit:    Cellulitis of right foot  -     amoxicillin-clavulanate (AUGMENTIN) 875-125 mg per tablet; Take 1 tablet by mouth every 12 (twelve) hours for 10 days  -     fluconazole (DIFLUCAN) 150 mg tablet; Take 1 tablet (150 mg total) by mouth once for 1 dose    Diabetic ulcer of toe of right foot associated with diabetes mellitus due to underlying condition, limited to breakdown of skin ([facility] )  -     Debridement          Subjective:     Patient ID: Cassie Maldonado is a 65 y o  female  The patient presents today for follow-up of a plantar right great toe wound  She does note interval improvement since her last visit but still does note some erythema around the toe joint  There is no significant pain or tenderness  He is currently dressing the wound site with a triple antibiotic ointment on a daily basis  She tolerated cephalexin well but is concerned that she may be developing a yeast infection  Review of Systems   Constitutional: Negative  HENT: Negative  Eyes: Negative  Respiratory: Negative  Cardiovascular: Negative  Endocrine: Negative  Musculoskeletal: Negative  Neurological: Negative  Hematological: Negative  Psychiatric/Behavioral: Negative  Objective:     Physical Exam  Constitutional:       Appearance: Normal appearance  HENT:      Head: Normocephalic and atraumatic  Nose: Nose normal    Cardiovascular:      Pulses:           Posterior tibial pulses are 1+ on the right side  Pulmonary:      Effort: Pulmonary effort is normal    Feet:      Right foot:      Skin integrity: Ulcer and erythema present  Comments: The plantar right great toe ulceration is much improved compared to her prior visit; it is relatively small measuring at about 4 x 6 mm in size by 1 mm depth; it is partial-thickness in nature; there is some mild but persistent erythema around the medial dorsal aspect of the right first metatarsophalangeal joint; there is no active drainage nor purulence there are no deep tracts or sinuses; wound margins are densely callused with some areas of partial-thickness eschar this was debrided today with a sterile 15 blade without complication  Skin:     General: Skin is warm  Capillary Refill: Capillary refill takes less than 2 seconds  Neurological:      General: No focal deficit present  Mental Status: She is alert and oriented to person, place, and time  Psychiatric:         Mood and Affect: Mood normal          Behavior: Behavior normal          Thought Content: Thought content normal            Debridement   Universal Protocol:  Consent: Verbal consent obtained  Risks and benefits: risks, benefits and alternatives were discussed  Consent given by: patient  Time out: Immediately prior to procedure a "time out" was called to verify the correct patient, procedure, equipment, support staff and site/side marked as required    Timeout called at: 1/18/2023 11:15 AM   Patient understanding: patient states understanding of the procedure being performed  Patient consent: the patient's understanding of the procedure matches consent given  Patient identity confirmed: verbally with patient and provided demographic data      Performed by: physician  Debridement type: selective  Pain control: none  Pre-debridement measurements  Length (cm): 0 6  Width (cm): 0 4  Depth (cm): 0 1  Surface Area (cm^2): 0 24  Volume (cm^3): 0 02    Post-debridement measurements  Length (cm): 0 6  Width (cm): 0 4  Depth (cm): 0 1  Percent debrided: 100%  Surface Area (cm^2): 0 24  Area debrided (cm^2): 0 24  Volume (cm^3): 0 02  Devitalized tissue debrided: callus and eschar  Instrument(s) utilized: blade  Bleeding: small  Hemostasis obtained with: silver nitrate  Procedural pain (0-10): insensate  Post-procedural pain: insensate   Response to treatment: procedure was tolerated well  Debridement Comments: The plantar right foot wound beneath the first metatarsophalangeal joint was debrided sharply with a sterile 15 blade of callus tissue and partial-thickness eschar; there is a small superficial wound present with a granular wound base postdebridement; no active drainage nor purulence no deep tracts or sinuses  An antimicrobial dressing was applied postdebridement

## 2023-03-12 DIAGNOSIS — E11.65 TYPE 2 DIABETES MELLITUS WITH HYPERGLYCEMIA, WITHOUT LONG-TERM CURRENT USE OF INSULIN (HCC): ICD-10-CM

## 2023-03-12 RX ORDER — EMPAGLIFLOZIN 25 MG/1
TABLET, FILM COATED ORAL
Qty: 90 TABLET | Refills: 0 | Status: SHIPPED | OUTPATIENT
Start: 2023-03-12

## 2023-06-06 DIAGNOSIS — E11.65 TYPE 2 DIABETES MELLITUS WITH HYPERGLYCEMIA, WITHOUT LONG-TERM CURRENT USE OF INSULIN (HCC): ICD-10-CM

## 2023-06-06 RX ORDER — EMPAGLIFLOZIN 25 MG/1
TABLET, FILM COATED ORAL
Qty: 90 TABLET | Refills: 0 | Status: SHIPPED | OUTPATIENT
Start: 2023-06-06

## 2023-06-15 ENCOUNTER — RA CDI HCC (OUTPATIENT)
Dept: OTHER | Facility: HOSPITAL | Age: 58
End: 2023-06-15

## 2023-06-15 NOTE — PROGRESS NOTES
[facility]  coding opportunities       Chart reviewed, no opportunity found:   [address]        Patients Insurance     Medicare Insurance: [employer] Advantage

## 2023-07-30 ENCOUNTER — APPOINTMENT (OUTPATIENT)
Dept: LAB | Age: 58
End: 2023-07-30
Payer: COMMERCIAL

## 2023-07-30 DIAGNOSIS — E11.65 TYPE 2 DIABETES MELLITUS WITH HYPERGLYCEMIA, WITHOUT LONG-TERM CURRENT USE OF INSULIN (HCC): ICD-10-CM

## 2023-07-30 LAB
ALBUMIN SERPL BCP-MCNC: 3.6 G/DL (ref 3.5–5)
ALP SERPL-CCNC: 110 U/L (ref 46–116)
ALT SERPL W P-5'-P-CCNC: 28 U/L (ref 12–78)
ANION GAP SERPL CALCULATED.3IONS-SCNC: 5 MMOL/L
AST SERPL W P-5'-P-CCNC: 17 U/L (ref 5–45)
BILIRUB SERPL-MCNC: 0.34 MG/DL (ref 0.2–1)
BUN SERPL-MCNC: 12 MG/DL (ref 5–25)
CALCIUM SERPL-MCNC: 9 MG/DL (ref 8.3–10.1)
CHLORIDE SERPL-SCNC: 107 MMOL/L (ref 96–108)
CHOLEST SERPL-MCNC: 158 MG/DL
CO2 SERPL-SCNC: 28 MMOL/L (ref 21–32)
CREAT SERPL-MCNC: 0.52 MG/DL (ref 0.6–1.3)
CREAT UR-MCNC: 55.3 MG/DL
EST. AVERAGE GLUCOSE BLD GHB EST-MCNC: 206 MG/DL
GFR SERPL CREATININE-BSD FRML MDRD: 106 ML/MIN/1.73SQ M
GLUCOSE P FAST SERPL-MCNC: 162 MG/DL (ref 65–99)
HBA1C MFR BLD: 8.8 %
HDLC SERPL-MCNC: 51 MG/DL
LDLC SERPL CALC-MCNC: 88 MG/DL (ref 0–100)
MICROALBUMIN UR-MCNC: 5 MG/L (ref 0–20)
MICROALBUMIN/CREAT 24H UR: 9 MG/G CREATININE (ref 0–30)
POTASSIUM SERPL-SCNC: 4 MMOL/L (ref 3.5–5.3)
PROT SERPL-MCNC: 7.4 G/DL (ref 6.4–8.4)
SODIUM SERPL-SCNC: 140 MMOL/L (ref 135–147)
TRIGL SERPL-MCNC: 94 MG/DL

## 2023-07-30 PROCEDURE — 80053 COMPREHEN METABOLIC PANEL: CPT

## 2023-07-30 PROCEDURE — 83036 HEMOGLOBIN GLYCOSYLATED A1C: CPT

## 2023-07-30 PROCEDURE — 36415 COLL VENOUS BLD VENIPUNCTURE: CPT

## 2023-07-30 PROCEDURE — 80061 LIPID PANEL: CPT

## 2023-08-01 ENCOUNTER — OFFICE VISIT (OUTPATIENT)
Dept: INTERNAL MEDICINE CLINIC | Facility: CLINIC | Age: 58
End: 2023-08-01
Payer: COMMERCIAL

## 2023-08-01 ENCOUNTER — TELEPHONE (OUTPATIENT)
Dept: ADMINISTRATIVE | Facility: OTHER | Age: 58
End: 2023-08-01

## 2023-08-01 VITALS
OXYGEN SATURATION: 95 % | SYSTOLIC BLOOD PRESSURE: 128 MMHG | HEIGHT: 69 IN | BODY MASS INDEX: 31.46 KG/M2 | HEART RATE: 87 BPM | RESPIRATION RATE: 16 BRPM | WEIGHT: 212.4 LBS | DIASTOLIC BLOOD PRESSURE: 82 MMHG

## 2023-08-01 DIAGNOSIS — F33.9 EPISODE OF RECURRENT MAJOR DEPRESSIVE DISORDER, UNSPECIFIED DEPRESSION EPISODE SEVERITY (HCC): ICD-10-CM

## 2023-08-01 DIAGNOSIS — E08.621 DIABETIC ULCER OF TOE OF RIGHT FOOT ASSOCIATED WITH DIABETES MELLITUS DUE TO UNDERLYING CONDITION, LIMITED TO BREAKDOWN OF SKIN (HCC): ICD-10-CM

## 2023-08-01 DIAGNOSIS — G62.9 NEUROPATHY: ICD-10-CM

## 2023-08-01 DIAGNOSIS — Z13.820 SCREENING FOR OSTEOPOROSIS: ICD-10-CM

## 2023-08-01 DIAGNOSIS — Z12.4 SCREENING FOR CERVICAL CANCER: ICD-10-CM

## 2023-08-01 DIAGNOSIS — F17.211 NICOTINE DEPENDENCE, CIGARETTES, IN REMISSION: ICD-10-CM

## 2023-08-01 DIAGNOSIS — Z78.9 ALCOHOL USE: ICD-10-CM

## 2023-08-01 DIAGNOSIS — L73.9 FOLLICULITIS: ICD-10-CM

## 2023-08-01 DIAGNOSIS — Z12.31 ENCOUNTER FOR SCREENING MAMMOGRAM FOR BREAST CANCER: ICD-10-CM

## 2023-08-01 DIAGNOSIS — Z12.2 ENCOUNTER FOR SCREENING FOR LUNG CANCER: ICD-10-CM

## 2023-08-01 DIAGNOSIS — E11.9 TYPE 2 DIABETES MELLITUS WITHOUT COMPLICATION, WITHOUT LONG-TERM CURRENT USE OF INSULIN (HCC): Primary | ICD-10-CM

## 2023-08-01 DIAGNOSIS — L97.511 DIABETIC ULCER OF TOE OF RIGHT FOOT ASSOCIATED WITH DIABETES MELLITUS DUE TO UNDERLYING CONDITION, LIMITED TO BREAKDOWN OF SKIN (HCC): ICD-10-CM

## 2023-08-01 DIAGNOSIS — E66.01 CLASS 2 SEVERE OBESITY DUE TO EXCESS CALORIES WITH SERIOUS COMORBIDITY AND BODY MASS INDEX (BMI) OF 35.0 TO 35.9 IN ADULT (HCC): ICD-10-CM

## 2023-08-01 DIAGNOSIS — M46.1 SACROILIITIS (HCC): ICD-10-CM

## 2023-08-01 PROCEDURE — 99214 OFFICE O/P EST MOD 30 MIN: CPT | Performed by: INTERNAL MEDICINE

## 2023-08-01 RX ORDER — DULOXETIN HYDROCHLORIDE 30 MG/1
30 CAPSULE, DELAYED RELEASE ORAL DAILY
Qty: 90 CAPSULE | Refills: 3 | Status: SHIPPED | OUTPATIENT
Start: 2023-08-01

## 2023-08-01 NOTE — ASSESSMENT & PLAN NOTE
Clinically stable and doing well continue the current medical regiment will continue monitor.   Continue Cymbalta

## 2023-08-01 NOTE — TELEPHONE ENCOUNTER
----- Message from Rajesh Ga sent at 8/1/2023  8:34 AM EDT -----  Regarding: care gap request/IRIS  08/01/23 8:34 AM    Hello, our patient attached above has had Diabetic Eye Exam completed/performed. Please assist in updating the patient chart by pulling the document from the Media Tab. The date of service is 10/26/2022.      Thank you,  Rajesh Ga  [facility]

## 2023-08-01 NOTE — PROGRESS NOTES
Assessment/Plan:    Type 2 diabetes mellitus without complication, without long-term current use of insulin ([facility])    Lab Results   Component Value Date    HGBA1C 8.8 (H) 07/30/2023   I have counselled the pt to follow a healthy and balanced diet ,and recommend routine exercise. I will be ordering diabetic laboratories including comprehensive metabolic panel, hemoglobin A1c, urine microalbumin, lipid panel. Target A1c under 7 would like the patient meet with pharmacist Mary to discuss Ozempic patient is wanting to talk to our pharmacist    Neuropathy  Clinically stable and doing well continue the current medical regiment will continue monitor. Sacroiliitis ([address])  Clinically stable and doing well continue the current medical regiment will continue monitor. Episode of recurrent major depressive disorder ([address])  Clinically stable and doing well continue the current medical regiment will continue monitor. Continue Cymbalta    Class 2 severe obesity due to excess calories with serious comorbidity and body mass index (BMI) of 35.0 to 35.9 in adult [facility])  Obesity -I have counseled patient following healthy and balanced diet, I would like the patient to lose weight, I would like the patient exercise routinely; we will continue monitor the patient's progress.     Alcohol use  Reports me not drinking alcohol in the last several months she is currently in AA and doing very well    Folliculitis  Suspect folliculitis recommend Bactroban cream 3 times daily x7 days, change showerhead, use antibacterial soap or chlorhexidine as symptoms not resolved in the next 1 to 2 weeks please call me for dermatology consultation         Problem List Items Addressed This Visit        Endocrine    Type 2 diabetes mellitus without complication, without long-term current use of insulin ([address]) - Primary       Lab Results   Component Value Date    HGBA1C 8.8 (H) 07/30/2023   I have counselled the pt to follow a healthy and balanced diet ,and recommend routine exercise. I will be ordering diabetic laboratories including comprehensive metabolic panel, hemoglobin A1c, urine microalbumin, lipid panel. Target A1c under 7 would like the patient meet with pharmacist Mary to discuss Ozempic patient is wanting to talk to our pharmacist         Relevant Orders    Comprehensive metabolic panel    Lipid Panel with Direct LDL reflex    Hemoglobin A1c (w/out EAG) ([facility] ONLY)    Microalbumin, Random Urine (W/Creatinine) ([facility] ONLY)    Ambulatory referral to clinical pharmacy       Nervous and Auditory    Neuropathy     Clinically stable and doing well continue the current medical regiment will continue monitor. Musculoskeletal and Integument    Sacroiliitis ([address])     Clinically stable and doing well continue the current medical regiment will continue monitor. Folliculitis     Suspect folliculitis recommend Bactroban cream 3 times daily x7 days, change showerhead, use antibacterial soap or chlorhexidine as symptoms not resolved in the next 1 to 2 weeks please call me for dermatology consultation         Relevant Medications    mupirocin (BACTROBAN) 2 % ointment       Other    Class 2 severe obesity due to excess calories with serious comorbidity and body mass index (BMI) of 35.0 to 35.9 in [facility])     Obesity -I have counseled patient following healthy and balanced diet, I would like the patient to lose weight, I would like the patient exercise routinely; we will continue monitor the patient's progress. Alcohol use     Reports me not drinking alcohol in the last several months she is currently in [address] [place] [address] and doing very well         Episode of recurrent major depressive disorder ([address])     Clinically stable and doing well continue the current medical regiment will continue monitor.   Continue Cymbalta         Relevant Medications    DULoxetine (Cymbalta) 30 mg delayed release capsule   Other Visit Diagnoses     Screening for cervical cancer        Encounter for screening mammogram for breast cancer        Relevant Orders    Mammo screening bilateral w 3d & cad    Encounter for screening for lung cancer        Relevant Orders    CT lung screening program    Nicotine dependence, cigarettes, in remission        Relevant Orders    CT lung screening program    Diabetic ulcer of toe of right foot associated with diabetes mellitus due to underlying condition, limited to breakdown of skin ([address])        Relevant Medications    mupirocin (BACTROBAN) 2 % ointment    Screening for osteoporosis        Relevant Orders    DXA bone density spine hip and pelvis          Return to office  6 months  call if any problems  Subjective:      Patient ID: Igor Llamas is a 63 y.o. female. HPI 59-year old female coming in for a follow up office visit regarding diabetes, depression, neuropathy, sacroiliitis, obesity, folliculitis and alcohol abuse; the patient reports me compliant taking medications without untoward side effects the. The patient is here to review his medical condition, update me on the medical condition and the patient reports me no hospitalizations and no ER visits. No injuries no illnesses here to review laboratories in detail she reports me following a healthy and balanced diet she is losing weight. She has noticed small skin lesions of her arms and legs which improved after changing the showerhead they have come back most recently. Dog walking lost bet friend , now in aa 3month sober; the pt reports skin lesions, she reports when she used pink tarmoline  Start with a black head     The following portions of the patient's history were reviewed and updated as appropriate: allergies, current medications, past family history, past medical history, past social history, past surgical history and problem list.    Review of Systems   Constitutional: Negative for activity change, appetite change and unexpected weight change.    HENT: Negative for congestion and postnasal drip. Eyes: Negative for visual disturbance. Respiratory: Negative for cough and shortness of breath. Cardiovascular: Negative for chest pain. Gastrointestinal: Negative for abdominal pain, diarrhea, nausea and vomiting. Skin:        follicles   Neurological: Negative for dizziness, light-headedness and headaches. Hematological: Negative for adenopathy. Objective:    Return in about 6 months (around 2/1/2024). No results found. No Known Allergies    Past Medical History:   Diagnosis Date   • Low back pain    • Non-toxic multinodular goiter      Past Surgical History:   Procedure Laterality Date   • STOMACH SURGERY      for morbid obesity / last assessed 9/10/12   • US GUIDED THYROID BIOPSY      biopsy thyroid using percutaneous core needle    • US GUIDED THYROID BIOPSY  7/1/2020     Current Outpatient Medications on File Prior to Visit   Medication Sig Dispense Refill   • Diclofen-raNITIdine-Capsaicin -0.025 MG-MG-% THPK      • DULoxetine (CYMBALTA) 60 mg delayed release capsule Take 1 tab PO daily with the  30 mg tab of cymbalta 90 capsule 3   • glucose blood (OneTouch Verio) test strip use 1 TEST STRIP to TEST BLOOD SUGAR once daily 200 strip 0   • Jardiance 25 MG TABS take 1 tablet by mouth every morning 90 tablet 0   • Lancets ([employer]ni Consumer ProductsTOUCH DELICA PLUS [id number]) MISC 1 each by Does not apply route daily 100 each 1   • [DISCONTINUED] DULoxetine (Cymbalta) 30 mg delayed release capsule Take 1 capsule (30 mg total) by mouth daily 90 capsule 2     No current facility-administered medications on file prior to visit.      Family History   Problem Relation Age of Onset   • Cancer Mother    • Depression Mother      Social History     Socioeconomic History   • Marital status: /Civil Union     Spouse name: Not on file   • Number of children: Not on file   • Years of education: Not on file   • Highest education level: Not on file   Occupational History   • Not on file   Tobacco Use   • Smoking status: Former     Packs/day: 1.50     Years: 38.00     Total pack years: 57.00     Types: Cigarettes     Start date: 37     Quit date: 2018     Years since quittin.5   • Smokeless tobacco: Never   Vaping Use   • Vaping Use: Never used   Substance and Sexual Activity   • Alcohol use: Not Currently     Comment: social    • Drug use: Never   • Sexual activity: Not Currently   Other Topics Concern   • Not on file   Social History Narrative    Exercising regularly          Social Determinants of Health     Financial Resource Strain: High Risk (2022)    Overall Financial Resource Strain (CARDIA)    • Difficulty of Paying Living Expenses: Hard   Food Insecurity: Not on file   Transportation Needs: No Transportation Needs (2022)    PRAPARE - Transportation    • Lack of Transportation (Medical): No    • Lack of Transportation (Non-Medical): No   Physical Activity: Not on file   Stress: Not on file   Social Connections: Not on file   Intimate Partner Violence: Not on file   Housing Stability: Not on file     Vitals:    23 0830   BP: 128/82   Pulse: 87   Resp: 16   SpO2: 95%   Weight: 96.3 kg (212 lb 6.4 oz)   Height: 5' 9" (1.753 m)     Results for orders placed or performed in visit on 23   Comprehensive metabolic panel   Result Value Ref Range    Sodium 140 135 - 147 mmol/L    Potassium 4.0 3.5 - 5.3 mmol/L    Chloride 107 96 - 108 mmol/L    CO2 28 21 - 32 mmol/L    ANION GAP 5 mmol/L    BUN 12 5 - 25 mg/dL    Creatinine 0.52 (L) 0.60 - 1.30 mg/dL    Glucose, Fasting 162 (H) 65 - 99 mg/dL    Calcium 9.0 8.3 - 10.1 mg/dL    AST 17 5 - 45 U/L    ALT 28 12 - 78 U/L    Alkaline Phosphatase 110 46 - 116 U/L    Total Protein 7.4 6.4 - 8.4 g/dL    Albumin 3.6 3.5 - 5.0 g/dL    Total Bilirubin 0.34 0.20 - 1.00 mg/dL    eGFR 106 ml/min/1.73sq m   Hemoglobin A1C   Result Value Ref Range    Hemoglobin A1C 8.8 (H) Normal 4.0-5.6%; PreDiabetic 5.7-6.4%;  Diabetic >=6.5%; Glycemic control for adults with diabetes <7.0% %     mg/dl   Lipid Panel with Direct LDL reflex   Result Value Ref Range    Cholesterol 158 See Comment mg/dL    Triglycerides 94 See Comment mg/dL    HDL, Direct 51 >=50 mg/dL    LDL Calculated 88 0 - 100 mg/dL     Weight (last 2 days)     Date/Time Weight    08/01/23 0830 96.3 (212.4)        Body mass index is 31.37 kg/m². BP      Temp      Pulse     Resp      SpO2        Vitals:    08/01/23 0830   Weight: 96.3 kg (212 lb 6.4 oz)     Vitals:    08/01/23 0830   Weight: 96.3 kg (212 lb 6.4 oz)       /82   Pulse 87   Resp 16   Ht 5' 9" (1.753 m)   Wt 96.3 kg (212 lb 6.4 oz)   SpO2 95%   BMI 31.37 kg/m²          Physical Exam  Vitals and nursing note reviewed. Constitutional:       General: She is not in acute distress. Appearance: Normal appearance. She is well-developed. She is obese. She is not ill-appearing or diaphoretic. HENT:      Head: Normocephalic. Eyes:      General: No scleral icterus. Right eye: No discharge. Left eye: No discharge. Conjunctiva/sclera: Conjunctivae normal.      Pupils: Pupils are equal, round, and reactive to light. Cardiovascular:      Rate and Rhythm: Normal rate and regular rhythm. Heart sounds: Normal heart sounds. No murmur heard. No friction rub. No gallop. Pulmonary:      Effort: No respiratory distress. Breath sounds: Normal breath sounds. No wheezing or rales. Abdominal:      General: Bowel sounds are normal. There is no distension. Palpations: Abdomen is soft. There is no mass. Tenderness: There is no abdominal tenderness. There is no guarding or rebound. Musculoskeletal:         General: No deformity. Cervical back: Neck supple. Lymphadenopathy:      Cervical: No cervical adenopathy. Neurological:      Mental Status: She is alert.       Coordination: Coordination normal.   Psychiatric:         Mood and Affect: Mood is not anxious or depressed. Thought Content: Thought content does not include suicidal ideation.        skin lesions erythmatous suspect folliculitis

## 2023-08-01 NOTE — ASSESSMENT & PLAN NOTE
Reports me not drinking alcohol in the last several months she is currently in [address] [place] [address] and doing very well The patient is a 71y Male complaining of abdominal pain.

## 2023-08-01 NOTE — ASSESSMENT & PLAN NOTE
Suspect folliculitis recommend Bactroban cream 3 times daily x7 days, change showerhead, use antibacterial soap or chlorhexidine as symptoms not resolved in the next 1 to 2 weeks please call me for dermatology consultation

## 2023-08-01 NOTE — ASSESSMENT & PLAN NOTE
Lab Results   Component Value Date    HGBA1C 8.8 (H) 07/30/2023   I have counselled the pt to follow a healthy and balanced diet ,and recommend routine exercise. I will be ordering diabetic laboratories including comprehensive metabolic panel, hemoglobin A1c, urine microalbumin, lipid panel.   Target A1c under 7 would like the patient meet with pharmacist Mary to discuss Ozempic patient is wanting to talk to our pharmacist

## 2023-08-01 NOTE — ASSESSMENT & PLAN NOTE
Obesity -I have counseled patient following healthy and balanced diet, I would like the patient to lose weight, I would like the patient exercise routinely; we will continue monitor the patient's progress. Bactrim Pregnancy And Lactation Text: This medication is Pregnancy Category D and is known to cause fetal risk.  It is also excreted in breast milk.

## 2023-08-01 NOTE — TELEPHONE ENCOUNTER
Upon review of the In Basket request we were able to locate, review, and update the patient chart as requested for Diabetic Eye Exam.    Any additional questions or concerns should be emailed to the Practice Liaisons via the appropriate education email address, please do not reply via In Basket.     Thank you  Larissa Lefort, MA

## 2023-08-03 ENCOUNTER — CLINICAL SUPPORT (OUTPATIENT)
Dept: INTERNAL MEDICINE CLINIC | Facility: CLINIC | Age: 58
End: 2023-08-03

## 2023-08-03 DIAGNOSIS — E11.9 TYPE 2 DIABETES MELLITUS WITHOUT COMPLICATION, WITHOUT LONG-TERM CURRENT USE OF INSULIN (HCC): ICD-10-CM

## 2023-08-03 DIAGNOSIS — E11.65 TYPE 2 DIABETES MELLITUS WITH HYPERGLYCEMIA, WITHOUT LONG-TERM CURRENT USE OF INSULIN (HCC): Primary | ICD-10-CM

## 2023-08-03 PROBLEM — Z98.84 HX OF GASTRIC BYPASS: Status: ACTIVE | Noted: 2023-08-03

## 2023-08-03 NOTE — ASSESSMENT & PLAN NOTE
Lab Results   Component Value Date    HGBA1C 8.8 (H) 07/30/2023   Most recent A1c above goal.   Reported FBG elevated. Could consider GLP1, noted patient has history of thyroid nodules, no history of thyroid cancer. CGM not covered     • Medications:  o Mounjaro: reviewed with patient, interested in starting. Will review with PCP to see if patient should repeat Thyroid US prior to starting. o Jardiance: continue for now. No compelling reason to continue with long term if controlled with GLP1. • Home Monitoring: reviewed with patient that personal cgm not covered at this time. Continue with daily checks for now.

## 2023-08-03 NOTE — PROGRESS NOTES
[address] , Pharmacist    Granddaughter present    ASSESSMENT/PLAN                                                                                     1. Type 2 diabetes mellitus with hyperglycemia, without long-term current use of insulin ([address])  Assessment & Plan:    Lab Results   Component Value Date    HGBA1C 8.8 (H) 07/30/2023   Most recent A1c above goal.   Reported FBG elevated. Could consider GLP1, noted patient has history of thyroid nodules, no history of thyroid cancer. CGM not covered     Medications:  Mounjaro: reviewed with patient, interested in starting. Will review with PCP to see if patient should repeat Thyroid US prior to starting. Jardiance: continue for now. No compelling reason to continue with long term if controlled with GLP1. Home Monitoring: reviewed with patient that personal cgm not covered at this time. Continue with daily checks for now. Gastric Bypass 2009    Thyroid US last completed 7/2020    Eye Exam:    Lab Results   Component Value Date    LEFTDIABRET None 10/26/2022    RIGHTDIABRET None 10/26/2022     Follow-Up: 2 months    SUBJECTIVE                                                                                                              Medication Adherence: Medication list reviewed with patient, reports the following discrepancies/problems:  Diclofen-raNITIdine-Capsaicin Thpk  DULoxetine  Jardiance: affordable  mupirocin  OneTouch Delica Plus [id number] Misc  OneTouch Verio Strp    Stopped metformin extended release due to diarrhea    Denies history of thyroid cancer or pancreatitis     2. Medication Efficacy:    Home blood sugar readings (no log, per memory): fbg today was 136, yesterday was 180. Checks once daily    Home Monitoring:  Glucometer: Yes, Brand: One Touch  CGM: No, Brand: but interested     3.  Lifestyle: no alcohol for three months, purchased row machine to increase physical activity but difficulty using due to back pain but plans to get recumbent bike. Considering moving to [place]. OBJECTIVE                                                                                                      Lab Results   Component Value Date    SODIUM 140 07/30/2023    K 4.0 07/30/2023    EGFR 106 07/30/2023    CREATININE 0.52 (L) 07/30/2023    GLUF 162 (H) 07/30/2023    RKVCUVOZ22 328 03/02/2020    MICROALBCRE 9 07/30/2023     Lab Results   Component Value Date    HGBA1C 8.8 (H) 07/30/2023    HGBA1C 8.6 (H) 12/22/2022    HGBA1C 10.5 (A) 08/17/2022     Pharmacist Tracking Tool  Reason For Outreach: Embedded Pharmacist  Demographics:  Intervention Method:  In Person  Type of Intervention: New  Topics Addressed: Diabetes and Obesity  Pharmacologic Interventions: Medication Initiation  Non-Pharmacologic Interventions: Care coordination, Disease state education and Medication/Device education  Time:  Direct Patient Care: 30 mins   Care Coordination: 15 mins  Recommendation Recipient: Patient/Caregiver  Outcome: Accepted

## 2023-08-07 ENCOUNTER — TELEPHONE (OUTPATIENT)
Dept: INTERNAL MEDICINE CLINIC | Facility: CLINIC | Age: 58
End: 2023-08-07

## 2023-08-07 DIAGNOSIS — E04.2 NON-TOXIC MULTINODULAR GOITER: Primary | ICD-10-CM

## 2023-08-07 NOTE — TELEPHONE ENCOUNTER
Prior Auth submitted for [facility] on CMM. Awaiting determination. Will advise patient once received.

## 2023-08-07 NOTE — TELEPHONE ENCOUNTER
Pt called stating she spoke with her pharmacy and pt was needing a PA on the script for tirzepatide 2.5 MG /0.5 ML    Pt also asked if Jak Boykin spoke with Dr. Nivia Warren about pt taking the medication? She mentioned something about Thyroid cancer?     Please advise

## 2023-08-10 ENCOUNTER — HOSPITAL ENCOUNTER (OUTPATIENT)
Dept: RADIOLOGY | Age: 58
Discharge: HOME | End: 2023-08-10
Payer: COMMERCIAL

## 2023-08-10 DIAGNOSIS — E04.2 NON-TOXIC MULTINODULAR GOITER: ICD-10-CM

## 2023-08-10 PROCEDURE — 76536 US EXAM OF HEAD AND NECK: CPT

## 2023-08-24 ENCOUNTER — HOSPITAL ENCOUNTER (OUTPATIENT)
Dept: RADIOLOGY | Age: 58
Discharge: HOME | End: 2023-08-24
Payer: COMMERCIAL

## 2023-08-24 VITALS — WEIGHT: 205 LBS | HEIGHT: 69 IN | BODY MASS INDEX: 30.36 KG/M2

## 2023-08-24 DIAGNOSIS — Z12.31 ENCOUNTER FOR SCREENING MAMMOGRAM FOR BREAST CANCER: ICD-10-CM

## 2023-08-24 PROCEDURE — 77067 SCR MAMMO BI INCL CAD: CPT

## 2023-08-24 PROCEDURE — 77063 BREAST TOMOSYNTHESIS BI: CPT

## 2023-08-28 ENCOUNTER — VBI (OUTPATIENT)
Dept: ADMINISTRATIVE | Facility: OTHER | Age: 58
End: 2023-08-28

## 2023-09-04 DIAGNOSIS — E11.65 TYPE 2 DIABETES MELLITUS WITH HYPERGLYCEMIA, WITHOUT LONG-TERM CURRENT USE OF INSULIN (HCC): ICD-10-CM

## 2023-09-05 RX ORDER — TIRZEPATIDE 2.5 MG/.5ML
INJECTION, SOLUTION SUBCUTANEOUS
Qty: 2 ML | Refills: 0 | Status: SHIPPED | OUTPATIENT
Start: 2023-09-05

## 2023-09-06 ENCOUNTER — TELEPHONE (OUTPATIENT)
Dept: INTERNAL MEDICINE CLINIC | Facility: CLINIC | Age: 58
End: 2023-09-06

## 2023-09-06 DIAGNOSIS — E11.65 TYPE 2 DIABETES MELLITUS WITH HYPERGLYCEMIA, WITHOUT LONG-TERM CURRENT USE OF INSULIN (HCC): ICD-10-CM

## 2023-09-06 RX ORDER — EMPAGLIFLOZIN 25 MG/1
TABLET, FILM COATED ORAL
Qty: 90 TABLET | Refills: 0 | Status: SHIPPED | OUTPATIENT
Start: 2023-09-06

## 2023-09-06 NOTE — TELEPHONE ENCOUNTER
Patient is asking for a refill on her mounjaro. She is asking if you can send the next dose which would be 5 mg.         Please advise

## 2023-10-09 ENCOUNTER — CLINICAL SUPPORT (OUTPATIENT)
Dept: INTERNAL MEDICINE CLINIC | Facility: CLINIC | Age: 58
End: 2023-10-09

## 2023-10-09 VITALS
DIASTOLIC BLOOD PRESSURE: 76 MMHG | HEART RATE: 99 BPM | OXYGEN SATURATION: 99 % | WEIGHT: 208.4 LBS | SYSTOLIC BLOOD PRESSURE: 112 MMHG | BODY MASS INDEX: 30.78 KG/M2

## 2023-10-09 DIAGNOSIS — E11.65 TYPE 2 DIABETES MELLITUS WITH HYPERGLYCEMIA, WITHOUT LONG-TERM CURRENT USE OF INSULIN (HCC): ICD-10-CM

## 2023-10-09 RX ORDER — ZINC GLUCONATE 50 MG
50 TABLET ORAL DAILY
COMMUNITY

## 2023-10-09 RX ORDER — MULTIVITAMIN
1 TABLET ORAL DAILY
COMMUNITY

## 2023-10-09 NOTE — PROGRESS NOTES
[address], Pharmacist    Granddaughter present    ASSESSMENT/PLAN                                                                                     1. Type 2 diabetes mellitus with hyperglycemia, without long-term current use of insulin [facility])  Assessment & Plan:    Lab Results   Component Value Date    HGBA1C 8.8 (H) 07/30/2023   Most recent A1c above goal but not reflective of current treatment as patient was not on Mounjaro. fasting blood sugar readings average to goal; would benefit from increasing Mounjaro due to increased weight loss potential.     Medications:  Mounjaro: increase to 5mg - patient to take additional dose when she gets home today, will take #2 - 2.5mg pens for dose next week. Home Monitoring: daily     Orders:  -     tirzepatide (Mounjaro) 5 MG/0.5ML; Inject 0.5 mL (5 mg total) under the skin every 7 days      Gastric Bypass 2009    Thyroid US last completed 2023    Eye Exam:  completed through [facility], recently saw in 2023  Lab Results   Component Value Date    LEFTDIABRET None 10/26/2022    RIGHTDIABRET None 10/26/2022     Follow-Up: 2 months    SUBJECTIVE                                                                                                              Medication Adherence: Medication list reviewed with patient, reports the following discrepancies/problems:  Diclofen-raNITIdine-Capsaicin Thpk  DULoxetine  Jardiance Tabs  mupirocin  OneTouch Delica Plus ADCTBL58L Misc  OneTouch Verio Strp  Tirzepatide: currently taking 2.5mg, has #3 pens remaining. Took dose this morning. Never received 5mg pens from pharmacy. Manganese: 500mg once daily  Zinc: 50mg once daily   MVI: once daily    2. Medication Efficacy:    Review of Systems   Constitutional: Negative for appetite change and unexpected weight change. Gastrointestinal: Positive for constipation (uncertain if related to [facility]).  Negative for diarrhea, nausea and vomiting. Home Monitoring:  Glucometer: Yes, Brand: One Touch  CGM: No, Brand: NA      3. Lifestyle:     Planning to start making own coffee creamer as she would like to have healthier alternative and can better control amount of added sugar. Friend passed away suddenly within the past week; has been stress eating yogurt lately     OBJECTIVE                                                                                                      fasting blood sugar (9/22-10/9): 103, 116, 136, 117, 138, 108, 142,   Pre-supper:174    Lab Results   Component Value Date    SODIUM 140 07/30/2023    K 4.0 07/30/2023    EGFR 106 07/30/2023    CREATININE 0.52 (L) 07/30/2023    GLUF 162 (H) 07/30/2023    EXPMSHTO04 328 03/02/2020    MICROALBCRE 9 07/30/2023     Lab Results   Component Value Date    HGBA1C 8.8 (H) 07/30/2023    HGBA1C 8.6 (H) 12/22/2022    HGBA1C 10.5 (A) 08/17/2022     Pharmacist Tracking Tool  Reason For Outreach: Embedded Pharmacist  Demographics:  Intervention Method:  In Person  Type of Intervention: Follow-Up  Topics Addressed: Diabetes  Pharmacologic Interventions: Dose or Frequency Adjusted  Non-Pharmacologic Interventions: Disease state education and Care Gap  Time:  Direct Patient Care: 15 mins  Care Coordination: 10 mins  Recommendation Recipient: Patient/Caregiver  Outcome: Accepted

## 2023-10-09 NOTE — PATIENT INSTRUCTIONS
Mounjaro: take extra 2.5mg pen when you get home today. For dose next week, take #2 - 2.5mg pens.   5mg pens from pharmacy

## 2023-10-09 NOTE — ASSESSMENT & PLAN NOTE
Lab Results   Component Value Date    HGBA1C 8.8 (H) 07/30/2023   Most recent A1c above goal but not reflective of current treatment as patient was not on Mounjaro. fasting blood sugar readings average to goal; would benefit from increasing Mounjaro due to increased weight loss potential.     • Medications:  o Mounjaro: increase to 5mg - patient to take additional dose when she gets home today, will take #2 - 2.5mg pens for dose next week.    • Home Monitoring: daily

## 2023-10-10 ENCOUNTER — TELEPHONE (OUTPATIENT)
Dept: ADMINISTRATIVE | Facility: OTHER | Age: 58
End: 2023-10-10

## 2023-10-10 NOTE — TELEPHONE ENCOUNTER
----- Message from Trevor Ryan Pharmacist sent at 10/9/2023  2:54 PM EDT -----  10/09/23 2:54 PM    May, our patient Eusebia Taylor has had Diabetic Eye Exam completed/performed. Please assist in updating the patient chart by making an External outreach to [address] [place] [id number] facility located in [facility]. The date of service is 2023.     Thank you,  Trevor Ryan, Pharmacist

## 2023-10-10 NOTE — LETTER
Diabetic Eye Exam Form    Date Requested: 10/10/23  Patient: Michelle Garcia  Patient : 1965   Referring Provider: Sameer Dawn DO      DIABETIC Eye Exam Date _______________________________      Type of Exam MUST be documented for Diabetic Eye Exams. Please CHECK ONE. Retinal Exam       Dilated Retinal Exam       OCT       Optomap-Iris Exam      Fundus Photography       Left Eye - Please check Retinopathy or No Retinopathy        Exam did show retinopathy    Exam did not show retinopathy       Right Eye - Please check Retinopathy or No Retinopathy       Exam did show retinopathy    Exam did not show retinopathy       Comments __________________________________________________________    Practice Providing Exam ______________________________________________    Exam Performed By (print name) _______________________________________      Provider Signature ___________________________________________________      These reports are needed for  compliance. Please fax this completed form and a copy of the Diabetic Eye Exam report to our office located at [address] [place] [address] as soon as possible via Fax [phone] [facility]: Phone [phone]  We thank you for your assistance in treating our mutual patient.

## 2023-10-10 NOTE — TELEPHONE ENCOUNTER
Upon review of the In Basket request and the patient's chart, initial outreach has been made via fax to facility. Please see Contacts section for details.      Thank you  Mecca Root

## 2023-10-17 NOTE — TELEPHONE ENCOUNTER
As a follow-up, a second attempt has been made for outreach via fax to facility. Please see Contacts section for details.     Thank you  Zahra Mercedes

## 2023-11-01 ENCOUNTER — RA CDI HCC (OUTPATIENT)
Dept: OTHER | Facility: HOSPITAL | Age: 58
End: 2023-11-01

## 2023-11-01 NOTE — PROGRESS NOTES
[address] [facility] coding opportunities       Chart reviewed, no opportunity found: [id number]        Patients Insurance     Medicare Insurance: [employer]

## 2023-11-06 ENCOUNTER — APPOINTMENT (OUTPATIENT)
Dept: LAB | Age: 58
End: 2023-11-06
Payer: COMMERCIAL

## 2023-11-06 DIAGNOSIS — E11.9 TYPE 2 DIABETES MELLITUS WITHOUT COMPLICATION, WITHOUT LONG-TERM CURRENT USE OF INSULIN (HCC): ICD-10-CM

## 2023-11-06 LAB
ALBUMIN SERPL BCP-MCNC: 4.1 G/DL (ref 3.5–5)
ALP SERPL-CCNC: 100 U/L (ref 34–104)
ALT SERPL W P-5'-P-CCNC: 21 U/L (ref 7–52)
ANION GAP SERPL CALCULATED.3IONS-SCNC: 5 MMOL/L
AST SERPL W P-5'-P-CCNC: 16 U/L (ref 13–39)
BILIRUB SERPL-MCNC: 0.38 MG/DL (ref 0.2–1)
BUN SERPL-MCNC: 15 MG/DL (ref 5–25)
CALCIUM SERPL-MCNC: 8.5 MG/DL (ref 8.4–10.2)
CHLORIDE SERPL-SCNC: 103 MMOL/L (ref 96–108)
CHOLEST SERPL-MCNC: 159 MG/DL
CO2 SERPL-SCNC: 30 MMOL/L (ref 21–32)
CREAT SERPL-MCNC: 0.59 MG/DL (ref 0.6–1.3)
EST. AVERAGE GLUCOSE BLD GHB EST-MCNC: 157 MG/DL
GFR SERPL CREATININE-BSD FRML MDRD: 102 ML/MIN/1.73SQ M
GLUCOSE P FAST SERPL-MCNC: 136 MG/DL (ref 65–99)
HBA1C MFR BLD: 7.1 %
HDLC SERPL-MCNC: 51 MG/DL
LDLC SERPL CALC-MCNC: 90 MG/DL (ref 0–100)
POTASSIUM SERPL-SCNC: 4.9 MMOL/L (ref 3.5–5.3)
PROT SERPL-MCNC: 7.2 G/DL (ref 6.4–8.4)
SODIUM SERPL-SCNC: 138 MMOL/L (ref 135–147)
TRIGL SERPL-MCNC: 92 MG/DL

## 2023-11-06 PROCEDURE — 80061 LIPID PANEL: CPT

## 2023-11-06 PROCEDURE — 83036 HEMOGLOBIN GLYCOSYLATED A1C: CPT

## 2023-11-06 PROCEDURE — 80053 COMPREHEN METABOLIC PANEL: CPT

## 2023-11-06 PROCEDURE — 36415 COLL VENOUS BLD VENIPUNCTURE: CPT

## 2023-11-08 ENCOUNTER — OFFICE VISIT (OUTPATIENT)
Dept: INTERNAL MEDICINE CLINIC | Facility: CLINIC | Age: 58
End: 2023-11-08
Payer: COMMERCIAL

## 2023-11-08 VITALS
SYSTOLIC BLOOD PRESSURE: 124 MMHG | RESPIRATION RATE: 16 BRPM | HEIGHT: 69 IN | HEART RATE: 88 BPM | BODY MASS INDEX: 30.24 KG/M2 | DIASTOLIC BLOOD PRESSURE: 78 MMHG | OXYGEN SATURATION: 98 % | WEIGHT: 204.2 LBS

## 2023-11-08 DIAGNOSIS — Z00.00 MEDICARE ANNUAL WELLNESS VISIT, SUBSEQUENT: ICD-10-CM

## 2023-11-08 DIAGNOSIS — Z12.4 SCREENING FOR CERVICAL CANCER: Primary | ICD-10-CM

## 2023-11-08 DIAGNOSIS — Z78.9 ALCOHOL USE: ICD-10-CM

## 2023-11-08 DIAGNOSIS — F33.0 MILD EPISODE OF RECURRENT MAJOR DEPRESSIVE DISORDER (HCC): ICD-10-CM

## 2023-11-08 DIAGNOSIS — E11.65 TYPE 2 DIABETES MELLITUS WITH HYPERGLYCEMIA, WITHOUT LONG-TERM CURRENT USE OF INSULIN (HCC): ICD-10-CM

## 2023-11-08 DIAGNOSIS — F17.211 NICOTINE DEPENDENCE, CIGARETTES, IN REMISSION: ICD-10-CM

## 2023-11-08 DIAGNOSIS — E66.09 CLASS 1 OBESITY DUE TO EXCESS CALORIES WITH SERIOUS COMORBIDITY AND BODY MASS INDEX (BMI) OF 30.0 TO 30.9 IN ADULT: ICD-10-CM

## 2023-11-08 DIAGNOSIS — Z12.2 ENCOUNTER FOR SCREENING FOR LUNG CANCER: ICD-10-CM

## 2023-11-08 LAB
LEFT EYE DIABETIC RETINOPATHY: NORMAL
LEFT EYE IMAGE QUALITY: NORMAL
LEFT EYE MACULAR EDEMA: NORMAL
LEFT EYE OTHER RETINOPATHY: NORMAL
RIGHT EYE DIABETIC RETINOPATHY: NORMAL
RIGHT EYE IMAGE QUALITY: NORMAL
RIGHT EYE MACULAR EDEMA: NORMAL
RIGHT EYE OTHER RETINOPATHY: NORMAL
SEVERITY (EYE EXAM): NORMAL

## 2023-11-08 PROCEDURE — 92250 FUNDUS PHOTOGRAPHY W/I&R: CPT | Performed by: INTERNAL MEDICINE

## 2023-11-08 PROCEDURE — G0439 PPPS, SUBSEQ VISIT: HCPCS | Performed by: INTERNAL MEDICINE

## 2023-11-08 PROCEDURE — 99214 OFFICE O/P EST MOD 30 MIN: CPT | Performed by: INTERNAL MEDICINE

## 2023-11-08 NOTE — TELEPHONE ENCOUNTER
As a final attempt, a third outreach has been made via telephone call to facility. Please see Contacts section for details. This encounter will be closed and completed by end of day. Should we receive the requested information because of previous outreach attempts, the requested patient's chart will be updated appropriately.      Thank you  Gian Juarez

## 2023-11-08 NOTE — PATIENT INSTRUCTIONS
Medicare Preventive Visit Patient Instructions  Thank you for completing your Welcome to Medicare Visit or Medicare Annual Wellness Visit today. Your next wellness visit will be due in one year (11/8/2024). The screening/preventive services that you may require over the next 5-10 years are detailed below. Some tests may not apply to you based off risk factors and/or age. Screening tests ordered at today's visit but not completed yet may show as past due. Also, please note that scanned in results may not display below. Preventive Screenings:  Service Recommendations Previous Testing/Comments   Colorectal Cancer Screening  * Colonoscopy    * Fecal Occult Blood Test (FOBT)/Fecal Immunochemical Test (FIT)  * Fecal DNA/Cologuard Test  * Flexible Sigmoidoscopy Age: 43-73 years old   Colonoscopy: every 10 years (may be performed more frequently if at higher risk)  OR  FOBT/FIT: every 1 year  OR  Cologuard: every 3 years  OR  Sigmoidoscopy: every 5 years  Screening may be recommended earlier than age 39 if at higher risk for colorectal cancer. Also, an individualized decision between you and your healthcare provider will decide whether screening between the ages of 77-80 would be appropriate. Colonoscopy: Not on file  FOBT/FIT: Not on file  Cologuard: Not on file  Sigmoidoscopy: Not on file          Breast Cancer Screening Age: 36 years old  Frequency: every 1-2 years  Not required if history of left and right mastectomy Mammogram: 08/24/2023    Screening Current   Cervical Cancer Screening Between the ages of 21-29, pap smear recommended once every 3 years. Between the ages of 32-69, can perform pap smear with HPV co-testing every 5 years.    Recommendations may differ for women with a history of total hysterectomy, cervical cancer, or abnormal pap smears in past. Pap Smear: Not on file        Hepatitis C Screening Once for adults born between 1945 and 1965  More frequently in patients at high risk for Hepatitis C Hep C Antibody: 12/22/2022    Screening Current   Diabetes Screening 1-2 times per year if you're at risk for diabetes or have pre-diabetes Fasting glucose: 136 mg/dL (11/6/2023)  A1C: 7.1 % (11/6/2023)  Screening Not Indicated  History Diabetes   Cholesterol Screening Once every 5 years if you don't have a lipid disorder. May order more often based on risk factors. Lipid panel: 11/06/2023    Screening Current     Other Preventive Screenings Covered by Medicare:  Abdominal Aortic Aneurysm (AAA) Screening: covered once if your at risk. You're considered to be at risk if you have a family history of AAA. Lung Cancer Screening: covers low dose CT scan once per year if you meet all of the following conditions: (1) Age 48-67; (2) No signs or symptoms of lung cancer; (3) Current smoker or have quit smoking within the last 15 years; (4) You have a tobacco smoking history of at least 20 pack years (packs per day multiplied by number of years you smoked); (5) You get a written order from a healthcare provider. Glaucoma Screening: covered annually if you're considered high risk: (1) You have diabetes OR (2) Family history of glaucoma OR (3)  aged 48 and older OR (3)  American aged 72 and older  Osteoporosis Screening: covered every 2 years if you meet one of the following conditions: (1) You're estrogen deficient and at risk for osteoporosis based off medical history and other findings; (2) Have a vertebral abnormality; (3) On glucocorticoid therapy for more than 3 months; (4) Have primary hyperparathyroidism; (5) On osteoporosis medications and need to assess response to drug therapy. Last bone density test (DXA Scan): Not on file. HIV Screening: covered annually if you're between the age of 14-79. Also covered annually if you are younger than 13 and older than 72 with risk factors for HIV infection. For pregnant patients, it is covered up to 3 times per pregnancy.     Immunizations:  Immunization Recommendations   Influenza Vaccine Annual influenza vaccination during flu season is recommended for all persons aged >= 6 months who do not have contraindications   Pneumococcal Vaccine   * Pneumococcal conjugate vaccine = PCV13 (Prevnar 13), PCV15 (Vaxneuvance), PCV20 (Prevnar 20)  * Pneumococcal polysaccharide vaccine = PPSV23 (Pneumovax) Adults 92-89 yo with certain risk factors or if 69+ yo  If never received any pneumonia vaccine: recommend Prevnar 20 (PCV20)  Give PCV20 if previously received 1 dose of PCV13 or PPSV23   Hepatitis B Vaccine 3 dose series if at intermediate or high risk (ex: diabetes, end stage renal disease, liver disease)   Respiratory syncytial virus (RSV) Vaccine - COVERED BY MEDICARE PART D  * RSVPreF3 (Arexvy) CDC recommends that adults 61years of age and older may receive a single dose of RSV vaccine using shared clinical decision-making (SCDM)   Tetanus (Td) Vaccine - COST NOT COVERED BY MEDICARE PART B Following completion of primary series, a booster dose should be given every 10 years to maintain immunity against tetanus. Td may also be given as tetanus wound prophylaxis. Tdap Vaccine - COST NOT COVERED BY MEDICARE PART B Recommended at least once for all adults. For pregnant patients, recommended with each pregnancy. Shingles Vaccine (Shingrix) - COST NOT COVERED BY MEDICARE PART B  2 shot series recommended in those 19 years and older who have or will have weakened immune systems or those 50 years and older     Health Maintenance Due:      Topic Date Due   • HIV Screening  Never done   • Cervical Cancer Screening  Never done   • Colorectal Cancer Screening  Never done   • Lung Cancer Screening  Never done   • Breast Cancer Screening: Mammogram  08/24/2024   • Hepatitis C Screening  Completed     Immunizations Due:      Topic Date Due   • Hepatitis A Vaccine (1 of 2 - Risk 2-dose series) Never done     Advance Directives   What are advance directives?   Advance directives are legal documents that state your wishes and plans for medical care. These plans are made ahead of time in case you lose your ability to make decisions for yourself. Advance directives can apply to any medical decision, such as the treatments you want, and if you want to donate organs. What are the types of advance directives? There are many types of advance directives, and each state has rules about how to use them. You may choose a combination of any of the following:  Living will: This is a written record of the treatment you want. You can also choose which treatments you do not want, which to limit, and which to stop at a certain time. This includes surgery, medicine, IV fluid, and tube feedings. Durable power of  for [facility]): This is a written record that states who you want to make healthcare choices for you when you are unable to make them for yourself. This person, called a proxy, is usually a family member or a friend. You may choose more than 1 proxy. Do not resuscitate (DNR) order:  A DNR order is used in case your heart stops beating or you stop breathing. It is a request not to have certain forms of treatment, such as CPR. A DNR order may be included in other types of advance directives. Medical directive: This covers the care that you want if you are in a coma, near death, or unable to make decisions for yourself. You can list the treatments you want for each condition. Treatment may include pain medicine, surgery, blood transfusions, dialysis, IV or tube feedings, and a ventilator (breathing machine). Values history: This document has questions about your views, beliefs, and how you feel and think about life. This information can help others choose the care that you would choose. Why are advance directives important? An advance directive helps you control your care. Although spoken wishes may be used, it is better to have your wishes written down.  Spoken wishes can be misunderstood, or not followed. Treatments may be given even if you do not want them. An advance directive may make it easier for your family to make difficult choices about your care. Urinary Incontinence   Urinary incontinence (UI)  is when you lose control of your bladder. UI develops because your bladder cannot store or empty urine properly. The 3 most common types of UI are stress incontinence, urge incontinence, or both. Medicines:   May be given to help strengthen your bladder control. Report any side effects of medication to your healthcare provider. Do pelvic muscle exercises often:  Your pelvic muscles help you stop urinating. Squeeze these muscles tight for 5 seconds, then relax for 5 seconds. Gradually work up to squeezing for 10 seconds. Do 3 sets of 15 repetitions a day, or as directed. This will help strengthen your pelvic muscles and improve bladder control. Train your bladder:  Go to the bathroom at set times, such as every 2 hours, even if you do not feel the urge to go. You can also try to hold your urine when you feel the urge to go. For example, hold your urine for 5 minutes when you feel the urge to go. As that becomes easier, hold your urine for 10 minutes. Self-care:   Keep a UI record. Write down how often you leak urine and how much you leak. Make a note of what you were doing when you leaked urine. Drink liquids as directed. You may need to limit the amount of liquid you drink to help control your urine leakage. Do not drink any liquid right before you go to bed. Limit or do not have drinks that contain caffeine or alcohol. Prevent constipation. Eat a variety of high-fiber foods. Good examples are high-fiber cereals, beans, vegetables, and whole-grain breads. Walking is the best way to trigger your intestines to have a bowel movement. Exercise regularly and maintain a healthy weight.   Weight loss and exercise will decrease pressure on your bladder and help you control your leakage. Use a catheter as directed  to help empty your bladder. A catheter is a tiny, plastic tube that is put into your bladder to drain your urine. Go to behavior therapy as directed. Behavior therapy may be used to help you learn to control your urge to urinate. Weight Management   Why it is important to manage your weight:  Being overweight increases your risk of health conditions such as heart disease, high blood pressure, type 2 diabetes, and certain types of cancer. It can also increase your risk for osteoarthritis, sleep apnea, and other respiratory problems. Aim for a slow, steady weight loss. Even a small amount of weight loss can lower your risk of health problems. How to lose weight safely:  A safe and healthy way to lose weight is to eat fewer calories and get regular exercise. You can lose up about 1 pound a week by decreasing the number of calories you eat by 500 calories each day. Healthy meal plan for weight management:  A healthy meal plan includes a variety of foods, contains fewer calories, and helps you stay healthy. A healthy meal plan includes the following:  Eat whole-grain foods more often. A healthy meal plan should contain fiber. Fiber is the part of grains, fruits, and vegetables that is not broken down by your body. Whole-grain foods are healthy and provide extra fiber in your diet. Some examples of whole-grain foods are whole-wheat breads and pastas, oatmeal, brown rice, and bulgur. Eat a variety of vegetables every day. Include dark, leafy greens such as spinach, kale, lolis greens, and mustard greens. Eat yellow and orange vegetables such as carrots, sweet potatoes, and winter squash. Eat a variety of fruits every day. Choose fresh or canned fruit (canned in its own juice or light syrup) instead of juice. Fruit juice has very little or no fiber. Eat low-fat dairy foods. Drink fat-free (skim) milk or 1% milk. Eat fat-free yogurt and low-fat cottage cheese.  Try low-fat cheeses such as mozzarella and other reduced-fat cheeses. Choose meat and other protein foods that are low in fat. Choose beans or other legumes such as split peas or lentils. Choose fish, skinless poultry (chicken or turkey), or lean cuts of red meat (beef or pork). Before you cook meat or poultry, cut off any visible fat. Use less fat and oil. Try baking foods instead of frying them. Add less fat, such as margarine, sour cream, regular salad dressing and mayonnaise to foods. Eat fewer high-fat foods. Some examples of high-fat foods include french fries, doughnuts, ice cream, and cakes. Eat fewer sweets. Limit foods and drinks that are high in sugar. This includes candy, cookies, regular soda, and sweetened drinks. Exercise:  Exercise at least 30 minutes per day on most days of the week. Some examples of exercise include walking, biking, dancing, and swimming. You can also fit in more physical activity by taking the stairs instead of the elevator or parking farther away from stores. Ask your healthcare provider about the best exercise plan for you. Alcohol Use and Your Health    Drinking too much can harm your health. Excessive alcohol use leads to about 88,000 death in [facility] each year, and shortens the life of those who diet by almost 30 years. Further, excessive drinking cost the economy $249 billion in 2010. Most excessive drinkers are not alcohol dependent. Excessive alcohol use has immediate effects that increase the risk of many harmful health conditions. These are most often the result of binge drinking. Over time, excessive alcohol use can lead to the development of chronic diseases and other series health problems. What is considered a "drink"? Excessive alcohol use includes:  Binge Drinking: For women, 4 or more drinks consumed on one occasion. For men, 5 or more drinks consumed on one occasion. Heavy Drinking: For women, 8 or more drinks per week.  For men, 15 or more drinks per week  Any alcohol used by pregnant women  Any alcohol used by those under the age of 21 years    If you choose to drink, do so in moderation:  Do not drink at all if you are under the age of 24, or if you are or may be pregnant, or have health problems that could be made worse by drinking. For women, up to 1 drink per day  For men, up to 2 drinks a day    No one should begin drinking or drink more frequently based on potential health benefits    Short-Term Health Risks:  Injuries: motor vehicle crashes, falls, drownings, burns  Violence: homicide, suicide, sexual assault, intimate partner violence  Alcohol poisoning  Reproductive health: risky sexual behaviors, unintended prengnacy, sexually transmitted diseases, miscarriage, stillbirth, fetal alcohol syndrome    Long-Term Health Risks:  Chronic diseases: high blood pressure, heart disease, stroke, liver disease, digestive problems  Cancers: breast, mouth and throat, liver, colon  Learning and memory problems: dementia, poor school performance  Mental health: depression, anxiety, insomnia  Social problems: lost productivity, family problems, unemployment  Alcohol dependence    For support and more information:  Substance Abuse and [address]  Web Address: https://[place]/    Alcoholics Anonymous        Web Address: http://www.[employer]iece [employer].info/    https://www.cdc.gov/alcohol/fact-sheets/alcohol-use.htm     © Copyright [employer] 2018 Information is for End User's use only and may not be sold, redistributed or otherwise used for commercial purposes. All illustrations and images included in CareNotes® are the copyrighted property of [employer] or [address] [place] [address]      Type 2 Diabetes Management for Adults   AMBULATORY CARE:   Type 2 diabetes  is a disease that affects how your body uses glucose (sugar).  Either your body cannot make enough insulin, or it cannot use the insulin correctly. It is important to keep diabetes controlled to prevent damage to your heart, blood vessels, and other organs. Management will help you feel well and enjoy your daily activities. Your diabetes care team providers can help you make a plan to fit diabetes care into your schedule. Your plan can change over time to fit your needs and your family's needs. Have someone call your local emergency number (911 in the [address]) if:   You cannot be woken. You have signs of diabetic ketoacidosis:     confusion, fatigue    vomiting    rapid heartbeat    fruity smelling breath    extreme thirst    dry mouth and skin    You have any of the following signs of a heart attack:      Squeezing, pressure, or pain in your chest    You may  also have any of the following:     Discomfort or pain in your back, neck, jaw, stomach, or arm    Shortness of breath    Nausea or vomiting    Lightheadedness or a sudden cold sweat    You have any of the following signs of a stroke:      Numbness or drooping on one side of your face     Weakness in an arm or leg    Confusion or difficulty speaking    Dizziness, a severe headache, or vision loss    Call your doctor or diabetes care team provider if:   You have a sore or wound that will not heal.    You have a change in the amount you urinate. Your blood sugar levels are higher than your target goals. You often have lower blood sugar levels than your target goals. Your skin is red, dry, warm, or swollen. You have trouble coping with diabetes, or you feel anxious or depressed. You have trouble following any part of your care plan, such as your meal plan. You have questions or concerns about your condition or care. What you need to know about high blood sugar levels:  High blood sugar levels may not cause any symptoms. You may feel more thirsty or urinate more often than usual. Over time, high blood sugar levels can damage your nerves, blood vessels, tissues, and organs. The following can increase your blood sugar levels:  Large meals or large amounts of carbohydrates at one time    Less physical activity    Stress    Illness    A lower dose of diabetes medicine or insulin, or a late dose    What you need to know about low blood sugar levels:  Symptoms include feeling shaky, dizzy, irritable, or confused. You can prevent symptoms by keeping your blood sugar levels from going too low. Treat a low blood sugar level right away:      Drink 4 ounces of juice or have 1 tube of glucose gel. Check your blood sugar level again 10 to 15 minutes later. When the level goes back to normal, eat a meal or snack to prevent another decrease. Keep glucose gel, raisins, or hard candy with you at all times to treat a low blood sugar level. Your blood sugar level can get too low if you take diabetes medicine or insulin and do not eat enough food. If you use insulin, check your blood sugar level before you exercise. If your blood sugar level is below 100 mg/dL, eat 4 crackers or 2 ounces of raisins, or drink 4 ounces of juice. Check your level every 30 minutes if you exercise longer than 1 hour. You may need a snack during or after exercise. What you can do to manage your blood sugar levels:   Check your blood sugar levels as directed and as needed. Several items are available to use to check your levels. You may need to check by testing a drop of blood in a glucose monitor. You may instead be given a continuous glucose monitoring (CGM) device. The device is worn at all times. The CGM checks your blood sugar level every 5 minutes. It sends results to an electronic device such as a smart phone. A CGM can be used with or without an insulin pump. You and your diabetes care team providers will decide on the best method for you. The goal for blood sugar levels before meals  is between 80 and 130 mg/dL and 2 hours after eating  is lower than 180 mg/dL.             Make healthy food choices. Work with a dietitian to create a meal plan that works for you and your schedule. A dietitian can help you learn how to eat the right amount of carbohydrates (sugar and starchy foods) during your meals and snacks. Examples of carbohydrates are breads, cereals, rice, pasta, fruit, low-fat dairy, and sweets. Carbohydrates can raise your blood sugar level if you eat too many at one time. Eat high-fiber foods as directed. Fiber helps improve blood sugar levels. Fiber also lowers your risk for heart disease and other problems diabetes can cause. Examples of high-fiber foods include vegetables, whole-grain bread, and beans such as mahoney beans. Your dietitian can tell you how much fiber to have each day. Get regular physical activity. Physical activity can help you get to your target blood sugar level goal and manage your weight. Get at least 150 minutes of moderate to vigorous aerobic physical activity each week. Resistance training, such as lifting weights, should be done 3 times each week. Do not miss more than 2 days of physical activity in a row. Do not sit longer than 30 minutes at a time. Your healthcare provider can help you create an activity plan. The plan can include the best activities for you and can help you build your strength and endurance. Maintain a healthy weight. Ask your team what a healthy weight is for you. A healthy weight can help you control diabetes and prevent heart disease. Ask your team to help you create a weight-loss plan, if needed. Even a loss of 3% to 7% of your excess body weight can help make a difference in managing diabetes. Your team will help you set a weight-loss goal, such as 10 to 15 pounds, or 5% of your extra weight. Together you and your team can set manageable weight-loss goals. Take your diabetes medicine or insulin as directed. You may need diabetes medicine, insulin, or both to help control your blood sugar levels. Your healthcare provider will teach you how and when to take your diabetes medicine or insulin. You will also be taught about side effects oral diabetes medicine can cause. Insulin may be injected or given through a pump or pen. You and your providers will decide on the best method for you: An insulin pump  is an implanted device that gives your insulin 24 hours a day. An insulin pump prevents the need for multiple insulin injections in a day. An insulin pen  is a device prefilled with the right amount of insulin. You and your family members will be taught how to draw up and give insulin  if this is the best method for you. Your providers will also teach you how to dispose of needles and syringes. You will learn how much insulin you need  and when to give it. You will be taught when not to give insulin. You will also be taught what to do if your blood sugar level drops too low. This may happen if you take insulin and do not eat the right amount of carbohydrates. More ways to manage type 2 diabetes:   Wear medical alert identification. Wear medical alert jewelry or carry a card that says you have diabetes. Ask your provider where to get these items. Do not smoke. Nicotine and other chemicals in cigarettes and cigars can cause lung and blood vessel damage. It also makes it more difficult to manage your diabetes. Ask your provider for information if you currently smoke and need help to quit. Do not use e-cigarettes or smokeless tobacco in place of cigarettes or to help you quit. They still contain nicotine. Check your feet each day for cuts, scratches, calluses, or other wounds. Look for redness and swelling, and feel for warmth. Wear shoes that fit well. Check your shoes for rocks or other objects that can hurt your feet. Do not walk barefoot or wear shoes without socks. Wear cotton socks to help keep your feet dry. Ask about vaccines you may need.   You have a higher risk for serious illness if you get the flu, pneumonia, COVID-19, or hepatitis. Ask your provider if you should get vaccines to prevent these or other diseases, and when to get the vaccines. Talk to your provider if you become stressed about diabetes care. Sometimes being able to fit diabetes care into your life can cause increased stress. The stress can cause you not to take care of yourself properly. Your provider can help by offering tips about self-care. A mental health provider can listen and offer help with self-care issues. Other types of counseling can help you make nutrition or physical activity changes. Have your A1c checked as directed. Your provider may check your A1c every 3 months, or 2 times each year if your diabetes is controlled. An A1c test shows the average amount of sugar in your blood over the past 2 to 3 months. Your provider will tell you what your A1c level should be. Have screening tests as directed. Your provider may recommend screening for complications of diabetes and other conditions that may develop. Some screenings may begin right away and some may happen within the first 5 years of diagnosis:    Examples of diabetes complications  include kidney problems, high cholesterol, high blood pressure, blood vessel problems, eye problems, and sleep apnea. You may be screened for a low vitamin B level  if you take oral diabetes medicine for a long time. You may be screened for polycystic ovarian syndrome (PCOS)  if you are of childbearing age. Follow up with your doctor or diabetes care team providers as directed: You may need to have blood tests done before your follow-up visit. The test results will show if changes need to be made in your treatment or self-care. Talk to your provider if you cannot afford your medicine. Write down your questions so you remember to ask them during your visits.   © Copyright [employer] 2023 Information is for End User's use only and may not be sold, redistributed or otherwise used for commercial purposes. The above information is an  only. It is not intended as medical advice for individual conditions or treatments. Talk to your doctor, nurse or pharmacist before following any medical regimen to see if it is safe and effective for you. Type 2 Diabetes Management for Adults   AMBULATORY CARE:   Type 2 diabetes  is a disease that affects how your body uses glucose (sugar). Either your body cannot make enough insulin, or it cannot use the insulin correctly. It is important to keep diabetes controlled to prevent damage to your heart, blood vessels, and other organs. Management will help you feel well and enjoy your daily activities. Your diabetes care team providers can help you make a plan to fit diabetes care into your schedule. Your plan can change over time to fit your needs and your family's needs. Have someone call your local emergency number (911 in the [address]) if:   You cannot be woken. You have signs of diabetic ketoacidosis:     confusion, fatigue    vomiting    rapid heartbeat    fruity smelling breath    extreme thirst    dry mouth and skin    You have any of the following signs of a heart attack:      Squeezing, pressure, or pain in your chest    You may  also have any of the following:     Discomfort or pain in your back, neck, jaw, stomach, or arm    Shortness of breath    Nausea or vomiting    Lightheadedness or a sudden cold sweat    You have any of the following signs of a stroke:      Numbness or drooping on one side of your face     Weakness in an arm or leg    Confusion or difficulty speaking    Dizziness, a severe headache, or vision loss    Call your doctor or diabetes care team provider if:   You have a sore or wound that will not heal.    You have a change in the amount you urinate. Your blood sugar levels are higher than your target goals.     You often have lower blood sugar levels than your target goals.    Your skin is red, dry, warm, or swollen. You have trouble coping with diabetes, or you feel anxious or depressed. You have trouble following any part of your care plan, such as your meal plan. You have questions or concerns about your condition or care. What you need to know about high blood sugar levels:  High blood sugar levels may not cause any symptoms. You may feel more thirsty or urinate more often than usual. Over time, high blood sugar levels can damage your nerves, blood vessels, tissues, and organs. The following can increase your blood sugar levels:  Large meals or large amounts of carbohydrates at one time    Less physical activity    Stress    Illness    A lower dose of diabetes medicine or insulin, or a late dose    What you need to know about low blood sugar levels:  Symptoms include feeling shaky, dizzy, irritable, or confused. You can prevent symptoms by keeping your blood sugar levels from going too low. Treat a low blood sugar level right away:      Drink 4 ounces of juice or have 1 tube of glucose gel. Check your blood sugar level again 10 to 15 minutes later. When the level goes back to normal, eat a meal or snack to prevent another decrease. Keep glucose gel, raisins, or hard candy with you at all times to treat a low blood sugar level. Your blood sugar level can get too low if you take diabetes medicine or insulin and do not eat enough food. If you use insulin, check your blood sugar level before you exercise. If your blood sugar level is below 100 mg/dL, eat 4 crackers or 2 ounces of raisins, or drink 4 ounces of juice. Check your level every 30 minutes if you exercise longer than 1 hour. You may need a snack during or after exercise. What you can do to manage your blood sugar levels:   Check your blood sugar levels as directed and as needed. Several items are available to use to check your levels.  You may need to check by testing a drop of blood in a glucose monitor. You may instead be given a continuous glucose monitoring (CGM) device. The device is worn at all times. The CGM checks your blood sugar level every 5 minutes. It sends results to an electronic device such as a smart phone. A CGM can be used with or without an insulin pump. You and your diabetes care team providers will decide on the best method for you. The goal for blood sugar levels before meals  is between 80 and 130 mg/dL and 2 hours after eating  is lower than 180 mg/dL. Make healthy food choices. Work with a dietitian to create a meal plan that works for you and your schedule. A dietitian can help you learn how to eat the right amount of carbohydrates (sugar and starchy foods) during your meals and snacks. Examples of carbohydrates are breads, cereals, rice, pasta, fruit, low-fat dairy, and sweets. Carbohydrates can raise your blood sugar level if you eat too many at one time. Eat high-fiber foods as directed. Fiber helps improve blood sugar levels. Fiber also lowers your risk for heart disease and other problems diabetes can cause. Examples of high-fiber foods include vegetables, whole-grain bread, and beans such as mahoney beans. Your dietitian can tell you how much fiber to have each day. Get regular physical activity. Physical activity can help you get to your target blood sugar level goal and manage your weight. Get at least 150 minutes of moderate to vigorous aerobic physical activity each week. Resistance training, such as lifting weights, should be done 3 times each week. Do not miss more than 2 days of physical activity in a row. Do not sit longer than 30 minutes at a time. Your healthcare provider can help you create an activity plan. The plan can include the best activities for you and can help you build your strength and endurance. Maintain a healthy weight. Ask your team what a healthy weight is for you.  A healthy weight can help you control diabetes and prevent heart disease. Ask your team to help you create a weight-loss plan, if needed. Even a loss of 3% to 7% of your excess body weight can help make a difference in managing diabetes. Your team will help you set a weight-loss goal, such as 10 to 15 pounds, or 5% of your extra weight. Together you and your team can set manageable weight-loss goals. Take your diabetes medicine or insulin as directed. You may need diabetes medicine, insulin, or both to help control your blood sugar levels. Your healthcare provider will teach you how and when to take your diabetes medicine or insulin. You will also be taught about side effects oral diabetes medicine can cause. Insulin may be injected or given through a pump or pen. You and your providers will decide on the best method for you: An insulin pump  is an implanted device that gives your insulin 24 hours a day. An insulin pump prevents the need for multiple insulin injections in a day. An insulin pen  is a device prefilled with the right amount of insulin. You and your family members will be taught how to draw up and give insulin  if this is the best method for you. Your providers will also teach you how to dispose of needles and syringes. You will learn how much insulin you need  and when to give it. You will be taught when not to give insulin. You will also be taught what to do if your blood sugar level drops too low. This may happen if you take insulin and do not eat the right amount of carbohydrates. More ways to manage type 2 diabetes:   Wear medical alert identification. Wear medical alert jewelry or carry a card that says you have diabetes. Ask your provider where to get these items. Do not smoke. Nicotine and other chemicals in cigarettes and cigars can cause lung and blood vessel damage. It also makes it more difficult to manage your diabetes.  Ask your provider for information if you currently smoke and need help to quit. Do not use e-cigarettes or smokeless tobacco in place of cigarettes or to help you quit. They still contain nicotine. Check your feet each day for cuts, scratches, calluses, or other wounds. Look for redness and swelling, and feel for warmth. Wear shoes that fit well. Check your shoes for rocks or other objects that can hurt your feet. Do not walk barefoot or wear shoes without socks. Wear cotton socks to help keep your feet dry. Ask about vaccines you may need. You have a higher risk for serious illness if you get the flu, pneumonia, COVID-19, or hepatitis. Ask your provider if you should get vaccines to prevent these or other diseases, and when to get the vaccines. Talk to your provider if you become stressed about diabetes care. Sometimes being able to fit diabetes care into your life can cause increased stress. The stress can cause you not to take care of yourself properly. Your provider can help by offering tips about self-care. A mental health provider can listen and offer help with self-care issues. Other types of counseling can help you make nutrition or physical activity changes. Have your A1c checked as directed. Your provider may check your A1c every 3 months, or 2 times each year if your diabetes is controlled. An A1c test shows the average amount of sugar in your blood over the past 2 to 3 months. Your provider will tell you what your A1c level should be. Have screening tests as directed. Your provider may recommend screening for complications of diabetes and other conditions that may develop. Some screenings may begin right away and some may happen within the first 5 years of diagnosis:    Examples of diabetes complications  include kidney problems, high cholesterol, high blood pressure, blood vessel problems, eye problems, and sleep apnea.     You may be screened for a low vitamin B level  if you take oral diabetes medicine for a long time.    You may be screened for polycystic ovarian syndrome (PCOS)  if you are of childbearing age. Follow up with your doctor or diabetes care team providers as directed: You may need to have blood tests done before your follow-up visit. The test results will show if changes need to be made in your treatment or self-care. Talk to your provider if you cannot afford your medicine. Write down your questions so you remember to ask them during your visits. © Copyright Thana Givens 2023 Information is for End User's use only and may not be sold, redistributed or otherwise used for commercial purposes. The above information is an  only. It is not intended as medical advice for individual conditions or treatments. Talk to your doctor, nurse or pharmacist before following any medical regimen to see if it is safe and effective for you.

## 2023-11-08 NOTE — PROGRESS NOTES
Assessment and Plan:     Problem List Items Addressed This Visit        Endocrine    Type 2 diabetes mellitus with hyperglycemia, without long-term current use of insulin ([address])       Lab Results   Component Value Date    HGBA1C 7.1 (H) 11/06/2023   Suboptimal control we will increase Mounjaro to 7.5 mg every 7 days I have counselled the pt to follow a healthy and balanced diet ,and recommend routine exercise. I will be ordering diabetic laboratories including comprehensive metabolic panel, hemoglobin A1c, urine microalbumin, lipid panel. Recommend annual eye examination         Relevant Medications    tirzepatide 7.5 MG/0.5ML    Other Relevant Orders    Comprehensive metabolic panel    Lipid Panel with Direct LDL reflex    Hemoglobin A1c (w/out EAG) ([facility] ONLY)    Microalbumin, Random Urine (W/Creatinine) ([facility] ONLY)    [facility] Diabetic eye exam (Completed)       Other    Class 1 obesity due to excess calories with serious comorbidity and body mass index (BMI) of 30.0 to 30.9 in adult     Obesity -I have counseled patient following healthy and balanced diet, I would like the patient to lose weight, I would like the patient exercise routinely; we will continue monitor the patient's progress. Making significant progress         Alcohol use     Excellent progress; no further alcohol intake         Episode of recurrent major depressive disorder ([address])     Clinically stable and doing well continue the current medical regiment will continue monitor. Continue Cymbalta 30 mg once daily no SI Medicare annual wellness visit, subsequent     Assessment and plan 1. Health maintenance annual wellness examination overall the patient is clinically stable and doing well, we encouraged the patient to follow a healthy and balanced diet. We recommend that the patient exercise routinely approximately 30 minutes 5 times per week .   We have reviewed the patient's vaccines and have made recommendations for updates if necessary consider flu   . We will be ordering screening laboratories which are age appropriate. Return to the office in    6 months call if any problems. Other Visit Diagnoses     Screening for cervical cancer    -  Primary    Relevant Orders    Ambulatory referral to Obstetrics / Gynecology    Encounter for screening for lung cancer        Nicotine dependence, cigarettes, in remission             Return to office 4 to 6 months months  call if any problems   Preventive health issues were discussed with patient, and age appropriate screening tests were ordered as noted in patient's After Visit Summary. Personalized health advice and appropriate referrals for health education or preventive services given if needed, as noted in patient's After Visit Summary. History of Present Illness:     Patient presents for a Medicare Wellness Visit    HPI 59-year old female coming in for a follow up office visit regarding obesity, alcohol abuse, depression, type 2 diabetes; the patient reports me compliant taking medications without untoward side effects the. The patient is here to review his medical condition, update me on the medical condition and the patient reports me no hospitalizations and no ER visits. No injuries no illnesses overall doing very well she has made excellent progress on her weight and doing very well [place] [facility] is following a healthy and balanced diet and she remains active she is here to review her laboratories in detail she tolerates the Mounjaro minimal constipation. No alcohol. Mood is doing well emotionally she is doing very well. Patient Care Team:  Samir Alford DO as PCP - General (Internal Medicine)  Marta Cooks, CRNP as PCP - PCP-[facility] Attributed-Abdulaziz Singh MD (Pain Medicine)     Review of Systems:   Patient's shoes and socks removed.          Review of Systems   Constitutional:  Negative for activity change, appetite change and unexpected weight change. HENT:  Negative for congestion and postnasal drip. Eyes:  Negative for visual disturbance. Respiratory:  Negative for cough and shortness of breath. Cardiovascular:  Negative for chest pain. Gastrointestinal:  Negative for abdominal pain, diarrhea, nausea and vomiting. Neurological:  Negative for dizziness, light-headedness and headaches. Hematological:  Negative for adenopathy.         Problem List:     Patient Active Problem List   Diagnosis   • Type 2 diabetes mellitus with hyperglycemia, without long-term current use of insulin (HCC)   • Spondylolisthesis at L3-L4 level   • Sacroiliitis (HCC)   • Lumbar spondylosis   • Class 1 obesity due to excess calories with serious comorbidity and body mass index (BMI) of 30.0 to 30.9 in adult   • Non-toxic multinodular goiter   • Type 2 diabetes mellitus without complication, without long-term current use of insulin (HCC)   • Anemia   • Hip pain   • Alcohol use   • Mild anemia   • Neuropathy   • Acute cystitis without hematuria   • Intervertebral disc disorder with radiculopathy of lumbar region   • Lumbar radiculopathy   • Closed compression fracture of L2 lumbar vertebra, initial encounter ([address])   • Episode of recurrent major depressive disorder ([address])   • Medicare annual wellness visit, subsequent   • Folliculitis   • Hx of gastric bypass      Past Medical and Surgical History:     Past Medical History:   Diagnosis Date   • Low back pain    • Non-toxic multinodular goiter      Past Surgical History:   Procedure Laterality Date   • STOMACH SURGERY      for morbid obesity / last assessed 9/10/12   • US GUIDED THYROID BIOPSY      biopsy thyroid using percutaneous core needle    • US GUIDED THYROID BIOPSY  7/1/2020      Family History:     Family History   Problem Relation Age of Onset   • Cancer Mother         brain   • Depression Mother    • Lung cancer Mother    • No Known Problems Daughter    • No Known Problems Daughter    • Lung cancer Maternal Aunt    • Ovarian cancer Maternal Aunt    • No Known Problems Maternal Aunt       Social History:     Social History     Socioeconomic History   • Marital status: /Civil Union     Spouse name: None   • Number of children: None   • Years of education: None   • Highest education level: None   Occupational History   • None   Tobacco Use   • Smoking status: Former     Packs/day: 1.50     Years: 38.00     Total pack years: 57.00     Types: Cigarettes     Start date: 37     Quit date: 2018     Years since quittin.8   • Smokeless tobacco: Never   Vaping Use   • Vaping Use: Never used   Substance and Sexual Activity   • Alcohol use: Not Currently     Comment: social    • Drug use: Never   • Sexual activity: Not Currently   Other Topics Concern   • None   Social History Narrative    Exercising regularly          Social Determinants of Health     Financial Resource Strain: Low Risk  (2023)    Overall Financial Resource Strain (CARDIA)    • Difficulty of Paying Living Expenses: Not hard at all   Food Insecurity: Not on file   Transportation Needs: No Transportation Needs (2023)    PRAPARE - Transportation    • Lack of Transportation (Medical): No    • Lack of Transportation (Non-Medical):  No   Physical Activity: Not on file   Stress: Not on file   Social Connections: Not on file   Intimate Partner Violence: Not on file   Housing Stability: Not on file      Medications and Allergies:     Current Outpatient Medications   Medication Sig Dispense Refill   • DULoxetine (Cymbalta) 30 mg delayed release capsule Take 1 capsule (30 mg total) by mouth daily 90 capsule 3   • DULoxetine (CYMBALTA) 60 mg delayed release capsule Take 1 tab PO daily with the  30 mg tab of cymbalta 90 capsule 3   • glucose blood (OneTouch Verio) test strip use 1 TEST STRIP to TEST BLOOD SUGAR once daily 200 strip 0   • Jardiance 25 MG TABS take 1 tablet by mouth every morning 90 tablet 0   • Lancets (ONETOUCH DELICA PLUS [id number]) MISC 1 each by Does not apply route daily 100 each 1   • MANGANESE PO Take 500 mg by mouth in the morning     • Multiple Vitamin (multivitamin) tablet Take 1 tablet by mouth daily     • tirzepatide (Mounjaro) 5 MG/0.5ML Inject 0.5 mL (5 mg total) under the skin every 7 days 2 mL 1   • tirzepatide 7.5 MG/0.5ML Inject 0.5 mL (7.5 mg total) under the skin every 7 days 2 mL 0   • Zinc 50 MG TABS Take 50 mg by mouth in the morning       No current facility-administered medications for this visit. No Known Allergies   Immunizations: There is no immunization history for the selected administration types on file for this patient. Health Maintenance:         Topic Date Due   • HIV Screening  Never done   • Cervical Cancer Screening  Never done   • Colorectal Cancer Screening  Never done   • Lung Cancer Screening  Never done   • Breast Cancer Screening: Mammogram  08/24/2024   • Hepatitis C Screening  Completed         Topic Date Due   • Hepatitis A Vaccine (1 of 2 - Risk 2-dose series) Never done      Medicare Screening Tests and Risk Assessments:     Wallace Cleaning is here for her Subsequent Wellness visit. Health Risk Assessment:   Patient rates overall health as good. Patient feels that their physical health rating is slightly better. Patient is very satisfied with their life. Eyesight was rated as same. Hearing was rated as same. Patient feels that their emotional and mental health rating is much better. Patients states they are never, rarely angry. Patient states they are sometimes unusually tired/fatigued. Pain experienced in the last 7 days has been a lot. Patient's pain rating has been 5/10. Patient states that she has experienced weight loss or gain in last 6 months. New medication; lower back pain arthritis lower back saw Dr Aye Olivares Screening:    In the past year, patient has experienced: no history of falling in past year      Urinary Incontinence Screening:   Patient has leaked urine accidently in the last six months. During sneeze or coughing fit. ..but i have always    Home Safety:  Patient does not have trouble with stairs inside or outside of their home. Patient has working smoke alarms and has working carbon monoxide detector. Home safety hazards include: none. Nutrition:   Current diet is Regular, Low Saturated Fat and Limited junk food. Medications:   Patient is currently taking over-the-counter supplements. OTC medications include: see medication list. Patient is able to manage medications. Activities of Daily Living (ADLs)/Instrumental Activities of Daily Living (IADLs):   Walk and transfer into and out of bed and chair?: Yes  Dress and groom yourself?: Yes    Bathe or shower yourself?: Yes    Feed yourself? Yes  Do your laundry/housekeeping?: Yes  Manage your money, pay your bills and track your expenses?: Yes  Make your own meals?: Yes    Do your own shopping?: Yes    Previous Hospitalizations:   Any hospitalizations or ED visits within the last 12 months?: No      Advance Care Planning:   Living will: No    Durable POA for healthcare: No    Advanced directive: No    End of Life Decisions reviewed with patient: Yes      Comments:  know pt's wish    Cognitive Screening:   Provider or family/friend/caregiver concerned regarding cognition?: No    PREVENTIVE SCREENINGS      Cardiovascular Screening:    General: Screening Current      Diabetes Screening:     General: Screening Not Indicated and History Diabetes      Breast Cancer Screening:     General: Screening Current      Hepatitis C Screening:    General: Screening Current    Screening, Brief Intervention, and Referral to Treatment (SBIRT)    Screening  Typical number of drinks in a day: 0  Typical number of drinks in a week: 0  Interpretation: Low risk drinking behavior.     AUDIT-C Screenin) How often did you have a drink containing alcohol in the past year? 4 or more times a week  2) How many drinks did you have on a typical day when you were drinking in the past year? 3 to 4  3) How often did you have 6 or more drinks on one occasion in the past year? weekly    AUDIT-C Score: 7  Interpretation: Score 3-12 (female): POSITIVE screen for alcohol misuse    AUDIT Screenin) How often during the last year have you found that you were not able to stop drinking once you had started? 3 - weekly  5) How often during the last year have you failed to do what was normally expected from you because of drinking? 0 - never  6) How often during the last year have you needed a first drink in the morning to get yourself going after a heavy drinking session? 0 - never  7) How often during the last year have you had a feeling of guilt or remorse after drinking? 4 - daily or almost daily  8) How often during the last year have you been unable to remember what happened the night before because you had been drinking? 4 - daily or almost daily  9) Have you or someone else been injured as a result of your drinking? 0 - no  10) Has a relative or friend or a doctor or another health worker been concerned about your drinking or suggested you cut down? 4 - yes, during the last year    AUDIT Score: 22  Interpretation: High risk alcohol consumption; likely alcohol dependence    Single Item Drug Screening:  How often have you used an illegal drug (including marijuana) or a prescription medication for non-medical reasons in the past year? never    Single Item Drug Screen Score: 0  Interpretation: Negative screen for possible drug use disorder    No results found. Physical Exam:     /78   Pulse 88   Resp 16   Ht 5' 9" (1.753 m)   Wt 92.6 kg (204 lb 3.2 oz)   SpO2 98%   BMI 30.16 kg/m²     Physical Exam  Constitutional:       General: She is not in acute distress. Appearance: Normal appearance. She is well-developed. She is obese. She is not ill-appearing or diaphoretic. HENT:      Head: Normocephalic and atraumatic.       Right Ear: External ear normal.      Left Ear: External ear normal.      Nose: Nose normal.   Eyes:      Pupils: Pupils are equal, round, and reactive to light. Cardiovascular:      Rate and Rhythm: Normal rate and regular rhythm. Heart sounds: Normal heart sounds. No murmur heard. Pulmonary:      Effort: Pulmonary effort is normal.      Breath sounds: Normal breath sounds. Abdominal:      General: There is no distension. Palpations: Abdomen is soft. Tenderness: There is no abdominal tenderness. There is no guarding. Neurological:      Mental Status: She is alert. Psychiatric:         Mood and Affect: Mood is not anxious or depressed. Thought Content: Thought content does not include suicidal ideation.           Jenna Max, DO

## 2023-11-09 NOTE — TELEPHONE ENCOUNTER
Upon review of the In Basket request we have not received the requested information after three attempts. Any additional questions or concerns should be emailed to the Practice Liaisons via the appropriate education email address, please do not reply via In Basket.     Thank you  Claudean Ober

## 2023-11-11 PROBLEM — E66.811 CLASS 1 OBESITY DUE TO EXCESS CALORIES WITH SERIOUS COMORBIDITY AND BODY MASS INDEX (BMI) OF 30.0 TO 30.9 IN ADULT: Status: ACTIVE | Noted: 2020-04-10

## 2023-11-11 PROBLEM — E66.09 CLASS 1 OBESITY DUE TO EXCESS CALORIES WITH SERIOUS COMORBIDITY AND BODY MASS INDEX (BMI) OF 30.0 TO 30.9 IN ADULT: Status: ACTIVE | Noted: 2020-04-10

## 2023-11-11 NOTE — ASSESSMENT & PLAN NOTE
Clinically stable and doing well continue the current medical regiment will continue monitor.   Continue Cymbalta 30 mg once daily no SI

## 2023-11-11 NOTE — ASSESSMENT & PLAN NOTE
Lab Results   Component Value Date    HGBA1C 7.1 (H) 11/06/2023   Suboptimal control we will increase Mounjaro to 7.5 mg every 7 days I have counselled the pt to follow a healthy and balanced diet ,and recommend routine exercise. I will be ordering diabetic laboratories including comprehensive metabolic panel, hemoglobin A1c, urine microalbumin, lipid panel.   Recommend annual eye examination

## 2023-11-11 NOTE — ASSESSMENT & PLAN NOTE
Obesity -I have counseled patient following healthy and balanced diet, I would like the patient to lose weight, I would like the patient exercise routinely; we will continue monitor the patient's progress.   Making significant progress

## 2023-11-11 NOTE — ASSESSMENT & PLAN NOTE
Assessment and plan 1. Health maintenance annual wellness examination overall the patient is clinically stable and doing well, we encouraged the patient to follow a healthy and balanced diet. We recommend that the patient exercise routinely approximately 30 minutes 5 times per week . We have reviewed the patient's vaccines and have made recommendations for updates if necessary   consider flu   . We will be ordering screening laboratories which are age appropriate. Return to the office in    6 months call if any problems.

## 2023-11-30 ENCOUNTER — VBI (OUTPATIENT)
Dept: ADMINISTRATIVE | Facility: OTHER | Age: 58
End: 2023-11-30

## 2023-12-06 DIAGNOSIS — E11.65 TYPE 2 DIABETES MELLITUS WITH HYPERGLYCEMIA, WITHOUT LONG-TERM CURRENT USE OF INSULIN (HCC): ICD-10-CM

## 2023-12-06 DIAGNOSIS — M79.18 MYOFASCIAL PAIN SYNDROME: ICD-10-CM

## 2023-12-06 RX ORDER — EMPAGLIFLOZIN 25 MG/1
TABLET, FILM COATED ORAL
Qty: 90 TABLET | Refills: 0 | Status: SHIPPED | OUTPATIENT
Start: 2023-12-06

## 2023-12-06 RX ORDER — TIRZEPATIDE 7.5 MG/.5ML
INJECTION, SOLUTION SUBCUTANEOUS
Qty: 2 ML | Refills: 0 | Status: SHIPPED | OUTPATIENT
Start: 2023-12-06

## 2023-12-06 NOTE — TELEPHONE ENCOUNTER
Mounjaro 7.5 mg working really well. Would like to get a 3 month supply. Please call patient if any issues. Reason for call:   [x] Refill   [] Prior Auth  [] Other:     Office:   [x] PCP/Provider -   [] Specialty/Provider -     Medication: Tirzepatide    Dose/Frequency: 7.5 mg     Quantity: 2 ml     Pharmacy: [facility]    Does the patient have enough for 3 days?    [] Yes   [x] No - Send as HP to POD

## 2023-12-07 RX ORDER — DULOXETIN HYDROCHLORIDE 60 MG/1
CAPSULE, DELAYED RELEASE ORAL
Qty: 90 CAPSULE | Refills: 3 | Status: SHIPPED | OUTPATIENT
Start: 2023-12-07

## 2023-12-31 ENCOUNTER — OFFICE VISIT (OUTPATIENT)
Dept: URGENT CARE | Age: 58
End: 2023-12-31
Payer: COMMERCIAL

## 2023-12-31 ENCOUNTER — APPOINTMENT (OUTPATIENT)
Dept: RADIOLOGY | Age: 58
End: 2023-12-31
Payer: COMMERCIAL

## 2023-12-31 VITALS
OXYGEN SATURATION: 100 % | DIASTOLIC BLOOD PRESSURE: 74 MMHG | HEART RATE: 89 BPM | RESPIRATION RATE: 20 BRPM | TEMPERATURE: 98 F | SYSTOLIC BLOOD PRESSURE: 120 MMHG

## 2023-12-31 DIAGNOSIS — R07.81 RIB PAIN ON LEFT SIDE: Primary | ICD-10-CM

## 2023-12-31 PROCEDURE — 71111 X-RAY EXAM RIBS/CHEST4/> VWS: CPT

## 2023-12-31 PROCEDURE — S9083 URGENT CARE CENTER GLOBAL: HCPCS | Performed by: NURSE PRACTITIONER

## 2023-12-31 PROCEDURE — 99213 OFFICE O/P EST LOW 20 MIN: CPT | Performed by: NURSE PRACTITIONER

## 2024-01-01 NOTE — PROGRESS NOTES
[facility]        NAME: Kinsey Duffy is a 58 y.o. female  : 1965    MRN: [id number]  DATE: 2023  TIME: 8:00 PM    Assessment and Plan   Rib pain on left side [R07.81]  1. Rib pain on left side  XR ribs bilateral 4+ vw w pa chest            Patient Instructions     Possible rib fracture  Waiting for official read from the radiologist  Incentive spirometer given to patient at the clinic by provider  Follow up with PCP in 3-5 days.  Proceed to  ER if symptoms worsen.    Chief Complaint     Chief Complaint   Patient presents with    Rib Injury     Choking on cupcake , had heimlich done for one minute  and now reporting rib pain left side. Pain with deep breathing.         History of Present Illness       HPI  Presents to clinic with complaint of pain on the left ribs.  States he was choking on some food.  Her family tried to do the Heimlich maneuver which resulted in pain on the left ribs.  Has been in pain since.  Worse with deep breathing.  Better at rest    Review of Systems   Review of Systems   Cardiovascular:  Positive for chest pain (Chest wall pain).   Neurological:  Negative for light-headedness.         Current Medications       Current Outpatient Medications:     DULoxetine (Cymbalta) 30 mg delayed release capsule, Take 1 capsule (30 mg total) by mouth daily, Disp: 90 capsule, Rfl: 3    DULoxetine (CYMBALTA) 60 mg delayed release capsule, take 1 capsule by mouth once daily WITH THE 30MG CAPSULE OF CYMBALTA, Disp: 90 capsule, Rfl: 3    glucose blood (OneTouch Verio) test strip, use 1 TEST STRIP to TEST BLOOD SUGAR once daily, Disp: 200 strip, Rfl: 0    Jardiance 25 MG TABS, take 1 tablet by mouth every morning, Disp: 90 tablet, Rfl: 0    Lancets (ONETOUCH DELICA PLUS [id number]) MISC, 1 each by Does not apply route daily, Disp: 100 each, Rfl: 1    MANGANESE PO, Take 500 mg by mouth in the morning, Disp: , Rfl:     Mounjaro 7.5 MG/0.5ML, INJECT  0.5 MILLILITERS UNDER THE SKIN  EVERY 7 DAYS, Disp: 2 mL, Rfl: 0    Multiple Vitamin (multivitamin) tablet, Take 1 tablet by mouth daily, Disp: , Rfl:     tirzepatide (Mounjaro) 5 MG/0.5ML, Inject 0.5 mL (5 mg total) under the skin every 7 days, Disp: 2 mL, Rfl: 1    tirzepatide 7.5 MG/0.5ML, Inject 0.5 mL (7.5 mg total) under the skin every 7 days, Disp: 2 mL, Rfl: 0    Zinc 50 MG TABS, Take 50 mg by mouth in the morning, Disp: , Rfl:     Current Allergies     Allergies as of 12/31/2023    (No Known Allergies)            The following portions of the patient's history were reviewed and updated as appropriate: allergies, current medications, past family history, past medical history, past social history, past surgical history and problem list.     Past Medical History:   Diagnosis Date    Low back pain     Non-toxic multinodular goiter        Past Surgical History:   Procedure Laterality Date    STOMACH SURGERY      for morbid obesity / last assessed 9/10/12    US GUIDED THYROID BIOPSY      biopsy thyroid using percutaneous core needle     US GUIDED THYROID BIOPSY  7/1/2020       Family History   Problem Relation Age of Onset    Cancer Mother         brain    Depression Mother     Lung cancer Mother     No Known Problems Daughter     No Known Problems Daughter     Lung cancer Maternal Aunt     Ovarian cancer Maternal Aunt     No Known Problems Maternal Aunt          Medications have been verified.        Objective   /74   Pulse 89   Temp 98 °F (36.7 °C) (Temporal)   Resp 20   SpO2 100%   No LMP recorded. Patient is postmenopausal.       Physical Exam     Physical Exam  Constitutional:       Appearance: She is not ill-appearing or diaphoretic.   Cardiovascular:      Rate and Rhythm: Regular rhythm.      Heart sounds: Normal heart sounds.   Pulmonary:      Effort: Pulmonary effort is normal.      Breath sounds: No wheezing.   Musculoskeletal:         General: Tenderness (Lower edge of the left rib left, laterally) present.

## 2024-01-03 DIAGNOSIS — E11.65 TYPE 2 DIABETES MELLITUS WITH HYPERGLYCEMIA, WITHOUT LONG-TERM CURRENT USE OF INSULIN (HCC): ICD-10-CM

## 2024-01-04 ENCOUNTER — CLINICAL SUPPORT (OUTPATIENT)
Dept: INTERNAL MEDICINE CLINIC | Facility: CLINIC | Age: 59
End: 2024-01-04

## 2024-01-04 ENCOUNTER — TELEPHONE (OUTPATIENT)
Dept: URGENT CARE | Age: 59
End: 2024-01-04

## 2024-01-04 DIAGNOSIS — E66.09 CLASS 1 OBESITY DUE TO EXCESS CALORIES WITH SERIOUS COMORBIDITY AND BODY MASS INDEX (BMI) OF 30.0 TO 30.9 IN ADULT: ICD-10-CM

## 2024-01-04 DIAGNOSIS — E11.65 TYPE 2 DIABETES MELLITUS WITH HYPERGLYCEMIA, WITHOUT LONG-TERM CURRENT USE OF INSULIN (HCC): Primary | ICD-10-CM

## 2024-01-04 NOTE — PROGRESS NOTES
[facility] Clinical Pharmacy Services  Alphonso Hendricks    This virtual check-in was done via [employer].  Encounter provider: Alphonso Hendricks    Patient agrees to participate in a virtual check in via telephone or video visit instead of presenting to the office to address urgent/immediate medical needs.       After connecting through [employer], the patient was identified by name and date of birth.  Kinsey Duffy was informed that this was a telemedicine visit and that the exam was being conducted confidentially over secure lines.  My office door was closed. No one else was in the room.  Kinsey DACOSTA Fortinoradha acknowledged consent and understanding of privacy and security of the telemedicine visit.  I informed the patient that I have reviewed She record in Epic and presented the opportunity for She to ask any questions regarding the visit today. The patient agreed to participate.        ASSESSMENT/PLAN                                                                                     1. Type 2 diabetes mellitus with hyperglycemia, without long-term current use of insulin (HCC)  Assessment & Plan:    Lab Results   Component Value Date    HGBA1C 7.1 (H) 11/06/2023   Patient will continue Mounjaro 7.5mg then increase to 10mg/week. Patient to continue Jardiance until supply used up then stop as patient does not have compelling indication for SGLT2i.    Orders:  -     tirzepatide (Mounjaro) 10 MG/0.5ML; Inject 0.5 mL (10 mg total) under the skin every 7 days    2. Class 1 obesity due to excess calories with serious comorbidity and body mass index (BMI) of 30.0 to 30.9 in adult      Gastric Bypass 2009    Thyroid US last completed 2023    Eye Exam:    Lab Results   Component Value Date    LEFTDIABRET None 11/08/2023    RIGHTDIABRET None 11/08/2023     Follow-Up: PCP next month; will hold off on scheduling follow-up appointment at this time, patient agrees to contact PharmD if needed    SUBJECTIVE                                                                                                               Medication Adherence: Medication list reviewed with patient, reports the following discrepancies/problems:  Diclofen-raNITIdine-Capsaicin Thpk  DULoxetine  Jardiance Tabs: has 2 months remaining of current supply  mupirocin  OneTouch Delica Plus [id number] Misc  OneTouch Verio Strp  Tirzepatide: currently taking 7.5mg, has 1 pen remaining of current supply will take on 1/8    2. Medication Efficacy:    Review of Systems   Constitutional:  Positive for appetite change (decreased). Negative for unexpected weight change.        Abdominal pain from heimlich improving, using aspercreme and tizanidine from previous supply but pain has been improving   Gastrointestinal:  Negative for constipation (no issues since last appt; drinking Smooth Move tea), diarrhea, nausea and vomiting.     Home Monitoring:  Glucometer: Yes, Brand: One Touch  CGM: No, Brand: NA      3. Lifestyle:     Uncertain if she will be moving to [place] later this year.     Home weight today was 188 pounds    OBJECTIVE                                                                                                      Home blood sugar readings (over past week)  Fasting blood sugar: 99, 85, 107   Post-breakfast: 105, 115    Lab Results   Component Value Date    SODIUM 138 11/06/2023    K 4.9 11/06/2023    EGFR 102 11/06/2023    CREATININE 0.59 (L) 11/06/2023    GLUF 136 (H) 11/06/2023    [id number] 328 03/02/2020    MICROALBCRE 10 11/06/2023     Lab Results   Component Value Date    HGBA1C 7.1 (H) 11/06/2023    HGBA1C 8.8 (H) 07/30/2023    HGBA1C 8.6 (H) 12/22/2022     Pharmacist Tracking Tool  Reason For Outreach: Embedded Pharmacist  Demographics:  Intervention Method: Video  Type of Intervention: Follow-Up  Topics Addressed: Diabetes  Pharmacologic Interventions: Medication Discontinuation and Dose or Frequency Adjusted  Non-Pharmacologic Interventions:  Disease state education  Time:  Direct Patient Care:  20  mins  Care Coordination:  15  mins  Recommendation Recipient: Patient/Caregiver  Outcome: Accepted

## 2024-01-04 NOTE — ASSESSMENT & PLAN NOTE
Lab Results   Component Value Date    HGBA1C 7.1 (H) 11/06/2023   Patient will continue Mounjaro 7.5mg then increase to 10mg/week. Patient to continue Jardiance until supply used up then stop as patient does not have compelling indication for SGLT2i.

## 2024-01-10 PROBLEM — Z00.00 MEDICARE ANNUAL WELLNESS VISIT, SUBSEQUENT: Status: RESOLVED | Noted: 2022-09-20 | Resolved: 2024-01-10

## 2024-01-11 ENCOUNTER — TELEPHONE (OUTPATIENT)
Age: 59
End: 2024-01-11

## 2024-01-11 NOTE — TELEPHONE ENCOUNTER
Pt was calling to see if her rx from 1/04/24 was sent to the pharmacy she said [facility] said they didn't have it. I told her that's right they dont have it since it was sent to [facility]. Pt was so glad.

## 2024-01-15 ENCOUNTER — TELEPHONE (OUTPATIENT)
Dept: INTERNAL MEDICINE CLINIC | Facility: CLINIC | Age: 59
End: 2024-01-15

## 2024-01-15 NOTE — TELEPHONE ENCOUNTER
Patient needs a prior authorization for:    Tirzepatide (Mounjaro) 10 MG/0.5ML       KEY # [id number]

## 2024-01-15 NOTE — TELEPHONE ENCOUNTER
[place] for tirzepatide (Mounjaro) 10 MG/0.5ML Not Required     Reason: Prior Authorization not required for patient/medication  Payer: [facility]    Note from payer: The patient currently has access to the requested medication and a Prior Authorization is not needed for the patient/medication.        Message sent to provider pool no

## 2024-02-06 ENCOUNTER — RA CDI HCC (OUTPATIENT)
Dept: OTHER | Facility: HOSPITAL | Age: 59
End: 2024-02-06

## 2024-02-11 ENCOUNTER — APPOINTMENT (OUTPATIENT)
Dept: LAB | Age: 59
End: 2024-02-11
Payer: COMMERCIAL

## 2024-02-11 DIAGNOSIS — E11.65 TYPE 2 DIABETES MELLITUS WITH HYPERGLYCEMIA, WITHOUT LONG-TERM CURRENT USE OF INSULIN (HCC): ICD-10-CM

## 2024-02-11 LAB
ALBUMIN SERPL BCP-MCNC: 4 G/DL (ref 3.5–5)
ALP SERPL-CCNC: 136 U/L (ref 34–104)
ALT SERPL W P-5'-P-CCNC: 21 U/L (ref 7–52)
ANION GAP SERPL CALCULATED.3IONS-SCNC: 8 MMOL/L
AST SERPL W P-5'-P-CCNC: 16 U/L (ref 13–39)
BILIRUB SERPL-MCNC: 0.46 MG/DL (ref 0.2–1)
BUN SERPL-MCNC: 10 MG/DL (ref 5–25)
CALCIUM SERPL-MCNC: 9.3 MG/DL (ref 8.4–10.2)
CHLORIDE SERPL-SCNC: 103 MMOL/L (ref 96–108)
CHOLEST SERPL-MCNC: 151 MG/DL
CO2 SERPL-SCNC: 27 MMOL/L (ref 21–32)
CREAT SERPL-MCNC: 0.55 MG/DL (ref 0.6–1.3)
CREAT UR-MCNC: 146.4 MG/DL
EST. AVERAGE GLUCOSE BLD GHB EST-MCNC: 134 MG/DL
GFR SERPL CREATININE-BSD FRML MDRD: 103 ML/MIN/1.73SQ M
GLUCOSE P FAST SERPL-MCNC: 112 MG/DL (ref 65–99)
HBA1C MFR BLD: 6.3 %
HDLC SERPL-MCNC: 52 MG/DL
LDLC SERPL CALC-MCNC: 80 MG/DL (ref 0–100)
MICROALBUMIN UR-MCNC: 81.6 MG/L
MICROALBUMIN/CREAT 24H UR: 56 MG/G CREATININE (ref 0–30)
POTASSIUM SERPL-SCNC: 5.1 MMOL/L (ref 3.5–5.3)
PROT SERPL-MCNC: 7.2 G/DL (ref 6.4–8.4)
SODIUM SERPL-SCNC: 138 MMOL/L (ref 135–147)
TRIGL SERPL-MCNC: 97 MG/DL

## 2024-02-11 PROCEDURE — 80053 COMPREHEN METABOLIC PANEL: CPT

## 2024-02-11 PROCEDURE — 82570 ASSAY OF URINE CREATININE: CPT

## 2024-02-11 PROCEDURE — 36415 COLL VENOUS BLD VENIPUNCTURE: CPT

## 2024-02-11 PROCEDURE — 80061 LIPID PANEL: CPT

## 2024-02-11 PROCEDURE — 82043 UR ALBUMIN QUANTITATIVE: CPT

## 2024-02-11 PROCEDURE — 83036 HEMOGLOBIN GLYCOSYLATED A1C: CPT

## 2024-02-12 ENCOUNTER — TELEPHONE (OUTPATIENT)
Dept: FAMILY MEDICINE CLINIC | Facility: CLINIC | Age: 59
End: 2024-02-12
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
## 2024-02-13 ENCOUNTER — TELEMEDICINE (OUTPATIENT)
Dept: INTERNAL MEDICINE CLINIC | Facility: CLINIC | Age: 59
End: 2024-02-13
Payer: COMMERCIAL

## 2024-02-13 DIAGNOSIS — E11.65 TYPE 2 DIABETES MELLITUS WITH HYPERGLYCEMIA, WITHOUT LONG-TERM CURRENT USE OF INSULIN (HCC): ICD-10-CM

## 2024-02-13 DIAGNOSIS — R74.8 ELEVATED ALKALINE PHOSPHATASE LEVEL: ICD-10-CM

## 2024-02-13 DIAGNOSIS — Z12.4 SCREENING FOR CERVICAL CANCER: Primary | ICD-10-CM

## 2024-02-13 DIAGNOSIS — E66.09 CLASS 1 OBESITY DUE TO EXCESS CALORIES WITH SERIOUS COMORBIDITY AND BODY MASS INDEX (BMI) OF 30.0 TO 30.9 IN ADULT: ICD-10-CM

## 2024-02-13 DIAGNOSIS — E11.9 TYPE 2 DIABETES MELLITUS WITHOUT COMPLICATION, WITHOUT LONG-TERM CURRENT USE OF INSULIN (HCC): ICD-10-CM

## 2024-02-13 DIAGNOSIS — F33.0 MILD EPISODE OF RECURRENT MAJOR DEPRESSIVE DISORDER (HCC): ICD-10-CM

## 2024-02-13 PROCEDURE — 99214 OFFICE O/P EST MOD 30 MIN: CPT | Performed by: INTERNAL MEDICINE

## 2024-02-13 NOTE — PATIENT INSTRUCTIONS

## 2024-02-13 NOTE — PROGRESS NOTES
Virtual Regular Visit    Verification of patient location:    Patient is located at Home in the following state in which I hold an active license [place]      Assessment/Plan:    Problem List Items Addressed This Visit        Endocrine    Type 2 diabetes mellitus with hyperglycemia, without long-term current use of insulin (HCC)    Relevant Medications    tirzepatide (Mounjaro) 7.5 MG/0.5ML    Other Relevant Orders    Comprehensive metabolic panel    Lipid Panel with Direct LDL reflex    Albumin / creatinine urine ratio    Hemoglobin A1c (w/out EAG) ([facility] ONLY)    Albumin / creatinine urine ratio    Type 2 diabetes mellitus without complication, without long-term current use of insulin (HCC)       Lab Results   Component Value Date    HGBA1C 6.3 (H) 02/11/2024   Patient does report to me an episode of hypoglycemia after increasing the dose of Mounjaro and also developing a viral gastroenteritis and not eating she did not go to the ER and did manage it at home hypoglycemia has improved at this point I did explain to the patient if this were to occur again please go to ER and the risk of hypoglycemia she understands.  Currently she is stable and doing well will reduce the dose of Mounjaro to 7.5 mg once weekly she tolerates very well her diabetes is well-controlled at this point would like to continue to optimize her weight she is making progress.  If any further problems please let me know.  I will be ordering diabetic laboratories including comprehensive metabolic panel, hemoglobin A1c, urine microalbumin, lipid panel.         Relevant Medications    tirzepatide (Mounjaro) 7.5 MG/0.5ML    Other Relevant Orders    Albumin / creatinine urine ratio       Other    Class 1 obesity due to excess calories with serious comorbidity and body mass index (BMI) of 30.0 to 30.9 in adult     Making excellent progress continue Mounjaro optimizing the diet routine exercise continue to monitor progress         Episode of recurrent major  depressive disorder (HCC)     Clinically stable and doing well continue the current medical regiment will continue monitor.  Continue Cymbalta         Elevated alkaline phosphatase level     Will check liver enzymes and vitamin D level         Relevant Orders    Vitamin D 25 hydroxy   Other Visit Diagnoses     Screening for cervical cancer    -  Primary    Body mass index (BMI) 30.0-30.9, adult        Relevant Orders    Vitamin D 25 hydroxy      RTO in 4 to 6 months coving problems  I have spent a total time of 30 minutes on 02/18/24 in caring for this patient including Diagnostic results, Instructions for management, Risk factor reductions, Impressions, Documenting in the medical record, Reviewing / ordering tests, medicine, procedures  , and Obtaining or reviewing history  .        Reason for visit is   Chief Complaint   Patient presents with   • Follow-up     6 month follow up   • Virtual Regular Visit          Encounter provider Steven Heart DO    Provider located at [address] [place] [phone]      Recent Visits  Date Type Provider Dept   02/13/24 Telemedicine Steven Heart DO  [facility]   Showing recent visits within past 7 days and meeting all other requirements  Future Appointments  No visits were found meeting these conditions.  Showing future appointments within next 150 days and meeting all other requirements       The patient was identified by name and date of birth. Kinsey Duffy was informed that this is a telemedicine visit and that the visit is being conducted through the Epic Embedded platform. She agrees to proceed..  My office door was closed. No one else was in the room.  She acknowledged consent and understanding of privacy and security of the video platform. The patient has agreed to participate and understands they can discontinue the visit at any time.    Type 2 diabetes mellitus with hyperglycemia,  without long-term current use of insulin (HCC)                 Lab Results   Component Value Date     HGBA1C 7.1 (H) 11/06/2023   Suboptimal control we will increase Mounjaro to 7.5 mg every 7 days I have counselled the pt to follow a healthy and balanced diet ,and recommend routine exercise.  I will be ordering diabetic laboratories including comprehensive metabolic panel, hemoglobin A1c, urine microalbumin, lipid panel.  Recommend annual eye examination           Relevant Medications     tirzepatide 7.5 MG/0.5ML     Other Relevant Orders     Comprehensive metabolic panel     Lipid Panel with Direct LDL reflex     Hemoglobin A1c (w/out EAG) ([facility] ONLY)     Microalbumin, Random Urine (W/Creatinine) ([facility] ONLY)     [facility] Diabetic eye exam (Completed)          Other     Class 1 obesity due to excess calories with serious comorbidity and body mass index (BMI) of 30.0 to 30.9 in adult       Obesity -I have counseled patient following healthy and balanced diet, I would like the patient to lose weight, I would like the patient exercise routinely; we will continue monitor the patient's progress.  Making significant progress           Alcohol use       Excellent progress; no further alcohol intake           Episode of recurrent major depressive disorder (HCC)       Clinically stable and doing well continue the current medical regiment will continue monitor.  Continue Cymbalta 30 mg once daily no SI Medicare annual wellness visit, subsequent       Assessment and plan 1.  Health maintenance annual wellness examination overall the patient is clinically stable and doing well, we encouraged the patient to follow a healthy and balanced diet.  We recommend that the patient exercise routinely approximately 30 minutes 5 times per week .  We have reviewed the patient's vaccines and have made recommendations for updates if necessary   consider flu   .  We will be ordering screening laboratories which are age appropriate.   Return to the office in    6 months call if any problems.             Other Visit Diagnoses      Screening for cervical cancer    -  Primary     Relevant Orders     Ambulatory referral to Obstetrics / Gynecolo   Tal er  Patient is aware this is a billable service.     Subjective  Kinsey Duffy is a 59 y.o. female follow-up examination.      HPI 59-year old female coming in for a follow up office visit regarding obesity, type 2 diabetes, depression, elevated alkaline phosphatase; the patient reports me compliant taking medications without untoward side effects the.  The patient is here to review his medical condition, update me on the medical condition and the patient reports me no hospitalizations and no ER visits.  Patient does report to me a recent viral gastroenteritis decreased appetite and diarrhea she reports me that on Sunday she developed hypoglycemia she reports me she was able to manage it at home with a sugar-containing drink.  She did not go to ER.  She does report to me she was able to to manage her symptoms at home at this point time she is starting to feel better she has not experienced any further hypoglycemia since Sunday.  Her bowel movements are starting to improve and her appetite is improving she has been able to keep food down and able to drink fluids.  Of note the Mounjaro was recently increased in dose.  Wt 193  Wedneday, virus decrease appetite cicken and vomited , thurs fri , sat diarrhea and Sunday ha, today migraine  low 58 down to 44 Sunday , drank peach juice ,  today 84, yesterday 94  Tim 69,63 49 2pm till 2:54 pm  shaking ,  she was using lindy gummy too much,  too tired to make anything , totally drained, no f/c yesterday diarrhea , drinking a lot of fluid , toat with butter this am, stir mcmahan last night , no rosa , neighbor had gi illness  Past Medical History:   Diagnosis Date   • Low back pain    • Non-toxic multinodular goiter        Past Surgical History:   Procedure  Laterality Date   • STOMACH SURGERY      for morbid obesity / last assessed 9/10/12   • US GUIDED THYROID BIOPSY      biopsy thyroid using percutaneous core needle    • US GUIDED THYROID BIOPSY  7/1/2020       Current Outpatient Medications   Medication Sig Dispense Refill   • DULoxetine (Cymbalta) 30 mg delayed release capsule Take 1 capsule (30 mg total) by mouth daily 90 capsule 3   • DULoxetine (CYMBALTA) 60 mg delayed release capsule take 1 capsule by mouth once daily WITH THE 30MG CAPSULE OF CYMBALTA 90 capsule 3   • glucose blood (OneTouch Verio) test strip use 1 TEST STRIP to TEST BLOOD SUGAR once daily 200 strip 0   • Lancets (ONETOUCH DELICA PLUS VNLKJK80I) MISC 1 each by Does not apply route daily 100 each 1   • MANGANESE PO Take 500 mg by mouth in the morning     • Multiple Vitamin (multivitamin) tablet Take 1 tablet by mouth daily     • tirzepatide (Mounjaro) 10 MG/0.5ML Inject 0.5 mL (10 mg total) under the skin every 7 days 6 mL 1   • tirzepatide (Mounjaro) 7.5 MG/0.5ML Inject 0.5 mL (7.5 mg total) under the skin every 7 days 2 mL 0   • Zinc 50 MG TABS Take 50 mg by mouth in the morning       No current facility-administered medications for this visit.        No Known Allergies    Review of Systems   Constitutional:  Positive for appetite change. Negative for chills and fever.   Gastrointestinal:  Positive for diarrhea. Negative for abdominal pain and blood in stool.   Endocrine:        Recent episode of hypoglycemia       Video Exam    There were no vitals filed for this visit.    Physical Exam     Visit Time  Total Visit Duration: 30min

## 2024-02-18 PROBLEM — R74.8 ELEVATED ALKALINE PHOSPHATASE LEVEL: Status: ACTIVE | Noted: 2024-02-18

## 2024-02-18 NOTE — ASSESSMENT & PLAN NOTE
Making excellent progress continue Mounjaro optimizing the diet routine exercise continue to monitor progress

## 2024-02-18 NOTE — ASSESSMENT & PLAN NOTE
Lab Results   Component Value Date    HGBA1C 6.3 (H) 02/11/2024   Patient does report to me an episode of hypoglycemia after increasing the dose of Mounjaro and also developing a viral gastroenteritis and not eating she did not go to the ER and did manage it at home hypoglycemia has improved at this point I did explain to the patient if this were to occur again please go to ER and the risk of hypoglycemia she understands.  Currently she is stable and doing well will reduce the dose of Mounjaro to 7.5 mg once weekly she tolerates very well her diabetes is well-controlled at this point would like to continue to optimize her weight she is making progress.  If any further problems please let me know.  I will be ordering diabetic laboratories including comprehensive metabolic panel, hemoglobin A1c, urine microalbumin, lipid panel.

## 2024-02-21 PROBLEM — N30.00 ACUTE CYSTITIS WITHOUT HEMATURIA: Status: RESOLVED | Noted: 2020-10-19 | Resolved: 2024-02-21

## 2024-03-12 DIAGNOSIS — E11.65 TYPE 2 DIABETES MELLITUS WITH HYPERGLYCEMIA, WITHOUT LONG-TERM CURRENT USE OF INSULIN (HCC): ICD-10-CM

## 2024-03-12 RX ORDER — BLOOD SUGAR DIAGNOSTIC
STRIP MISCELLANEOUS
Qty: 200 STRIP | Refills: 0 | Status: SHIPPED | OUTPATIENT
Start: 2024-03-12

## 2024-03-12 RX ORDER — TIRZEPATIDE 7.5 MG/.5ML
INJECTION, SOLUTION SUBCUTANEOUS
Qty: 2 ML | Refills: 2 | Status: SHIPPED | OUTPATIENT
Start: 2024-03-12

## 2024-03-12 NOTE — TELEPHONE ENCOUNTER
Refill must be reviewed and completed by the office or provider. The refill is unable to be approved by the medication management team.    OFF PROTOCOL

## 2024-06-21 DIAGNOSIS — E11.65 TYPE 2 DIABETES MELLITUS WITH HYPERGLYCEMIA, WITHOUT LONG-TERM CURRENT USE OF INSULIN (HCC): ICD-10-CM

## 2024-06-21 RX ORDER — TIRZEPATIDE 10 MG/.5ML
INJECTION, SOLUTION SUBCUTANEOUS
Qty: 6 ML | Refills: 1 | Status: SHIPPED | OUTPATIENT
Start: 2024-06-21

## 2024-07-29 NOTE — TELEPHONE ENCOUNTER
This patient would would like to speak with you regarding her jardiance    Thank you [[email or web address]] : Milton 

## 2024-08-14 DIAGNOSIS — M79.18 MYOFASCIAL PAIN SYNDROME: ICD-10-CM

## 2024-08-14 RX ORDER — DULOXETIN HYDROCHLORIDE 60 MG/1
CAPSULE, DELAYED RELEASE ORAL
Qty: 90 CAPSULE | Refills: 1 | Status: SHIPPED | OUTPATIENT
Start: 2024-08-14

## 2024-08-14 NOTE — TELEPHONE ENCOUNTER
Patient was last seen by spine and pain 12/2021. PCP has been managing Cymbalta. Two strengths are listed in the patients chart. Will forward to clinical to confirm patients dosage.

## 2024-09-01 DIAGNOSIS — E11.65 TYPE 2 DIABETES MELLITUS WITH HYPERGLYCEMIA, WITHOUT LONG-TERM CURRENT USE OF INSULIN (HCC): ICD-10-CM

## 2024-09-02 RX ORDER — BLOOD SUGAR DIAGNOSTIC
STRIP MISCELLANEOUS
Qty: 100 STRIP | Refills: 1 | Status: SHIPPED | OUTPATIENT
Start: 2024-09-02

## 2024-09-03 DIAGNOSIS — F33.9 EPISODE OF RECURRENT MAJOR DEPRESSIVE DISORDER, UNSPECIFIED DEPRESSION EPISODE SEVERITY (HCC): ICD-10-CM

## 2024-09-04 RX ORDER — DULOXETIN HYDROCHLORIDE 30 MG/1
30 CAPSULE, DELAYED RELEASE ORAL DAILY
Qty: 90 CAPSULE | Refills: 1 | Status: SHIPPED | OUTPATIENT
Start: 2024-09-04

## 2024-12-20 ENCOUNTER — VBI (OUTPATIENT)
Dept: ADMINISTRATIVE | Facility: OTHER | Age: 59
End: 2024-12-20

## 2024-12-20 NOTE — TELEPHONE ENCOUNTER
12/20/24 2:05 PM     Chart reviewed for Diabetic Eye Exam was/were submitted to the patient's insurance.     James Worthy MA   [facility]

## 2025-01-27 DIAGNOSIS — E11.65 TYPE 2 DIABETES MELLITUS WITH HYPERGLYCEMIA, WITHOUT LONG-TERM CURRENT USE OF INSULIN (HCC): ICD-10-CM

## 2025-01-27 DIAGNOSIS — F33.9 EPISODE OF RECURRENT MAJOR DEPRESSIVE DISORDER, UNSPECIFIED DEPRESSION EPISODE SEVERITY (HCC): ICD-10-CM

## 2025-01-27 DIAGNOSIS — M79.18 MYOFASCIAL PAIN SYNDROME: ICD-10-CM

## 2025-01-27 RX ORDER — TIRZEPATIDE 10 MG/.5ML
INJECTION, SOLUTION SUBCUTANEOUS
Qty: 2 ML | Refills: 1 | Status: SHIPPED | OUTPATIENT
Start: 2025-01-27

## 2025-01-27 RX ORDER — DULOXETIN HYDROCHLORIDE 60 MG/1
CAPSULE, DELAYED RELEASE ORAL
Qty: 90 CAPSULE | Refills: 1 | Status: SHIPPED | OUTPATIENT
Start: 2025-01-27

## 2025-01-27 RX ORDER — DULOXETIN HYDROCHLORIDE 30 MG/1
30 CAPSULE, DELAYED RELEASE ORAL DAILY
Qty: 90 CAPSULE | Refills: 1 | Status: SHIPPED | OUTPATIENT
Start: 2025-01-27

## 2025-03-27 DIAGNOSIS — E11.65 TYPE 2 DIABETES MELLITUS WITH HYPERGLYCEMIA, WITHOUT LONG-TERM CURRENT USE OF INSULIN (HCC): ICD-10-CM

## 2025-03-27 RX ORDER — TIRZEPATIDE 10 MG/.5ML
INJECTION, SOLUTION SUBCUTANEOUS
Qty: 2 ML | Refills: 1 | Status: SHIPPED | OUTPATIENT
Start: 2025-03-27

## 2025-03-28 DIAGNOSIS — E11.9 TYPE 2 DIABETES MELLITUS WITHOUT COMPLICATION, WITHOUT LONG-TERM CURRENT USE OF INSULIN (HCC): Primary | ICD-10-CM

## 2025-03-28 RX ORDER — TIRZEPATIDE 10 MG/.5ML
INJECTION, SOLUTION SUBCUTANEOUS
Qty: 2 ML | Refills: 0 | OUTPATIENT
Start: 2025-03-28

## 2025-03-30 ENCOUNTER — RA CDI HCC (OUTPATIENT)
Dept: OTHER | Facility: HOSPITAL | Age: 60
End: 2025-03-30

## 2025-04-06 ENCOUNTER — APPOINTMENT (OUTPATIENT)
Dept: LAB | Age: 60
End: 2025-04-06
Payer: COMMERCIAL

## 2025-04-06 DIAGNOSIS — E11.9 TYPE 2 DIABETES MELLITUS WITHOUT COMPLICATION, WITHOUT LONG-TERM CURRENT USE OF INSULIN (HCC): ICD-10-CM

## 2025-04-06 LAB
ALBUMIN SERPL BCG-MCNC: 4.1 G/DL (ref 3.5–5)
ALP SERPL-CCNC: 104 U/L (ref 34–104)
ALT SERPL W P-5'-P-CCNC: 17 U/L (ref 7–52)
ANION GAP SERPL CALCULATED.3IONS-SCNC: 9 MMOL/L (ref 4–13)
AST SERPL W P-5'-P-CCNC: 16 U/L (ref 13–39)
BILIRUB SERPL-MCNC: 0.42 MG/DL (ref 0.2–1)
BUN SERPL-MCNC: 8 MG/DL (ref 5–25)
CALCIUM SERPL-MCNC: 8.9 MG/DL (ref 8.4–10.2)
CHLORIDE SERPL-SCNC: 101 MMOL/L (ref 96–108)
CHOLEST SERPL-MCNC: 169 MG/DL (ref ?–200)
CO2 SERPL-SCNC: 28 MMOL/L (ref 21–32)
CREAT SERPL-MCNC: 0.52 MG/DL (ref 0.6–1.3)
CREAT UR-MCNC: 148.3 MG/DL
EST. AVERAGE GLUCOSE BLD GHB EST-MCNC: 143 MG/DL
GFR SERPL CREATININE-BSD FRML MDRD: 104 ML/MIN/1.73SQ M
GLUCOSE P FAST SERPL-MCNC: 110 MG/DL (ref 65–99)
HBA1C MFR BLD: 6.6 %
HDLC SERPL-MCNC: 62 MG/DL
LDLC SERPL CALC-MCNC: 85 MG/DL (ref 0–100)
MICROALBUMIN UR-MCNC: 10.6 MG/L
MICROALBUMIN/CREAT 24H UR: 7 MG/G CREATININE (ref 0–30)
POTASSIUM SERPL-SCNC: 4 MMOL/L (ref 3.5–5.3)
PROT SERPL-MCNC: 7.2 G/DL (ref 6.4–8.4)
SODIUM SERPL-SCNC: 138 MMOL/L (ref 135–147)
TRIGL SERPL-MCNC: 111 MG/DL (ref ?–150)

## 2025-04-06 PROCEDURE — 83036 HEMOGLOBIN GLYCOSYLATED A1C: CPT

## 2025-04-06 PROCEDURE — 80053 COMPREHEN METABOLIC PANEL: CPT

## 2025-04-06 PROCEDURE — 80061 LIPID PANEL: CPT

## 2025-04-06 PROCEDURE — 82043 UR ALBUMIN QUANTITATIVE: CPT

## 2025-04-06 PROCEDURE — 82570 ASSAY OF URINE CREATININE: CPT

## 2025-04-06 PROCEDURE — 36415 COLL VENOUS BLD VENIPUNCTURE: CPT

## 2025-04-08 ENCOUNTER — TELEPHONE (OUTPATIENT)
Dept: ADMINISTRATIVE | Facility: OTHER | Age: 60
End: 2025-04-08

## 2025-04-08 ENCOUNTER — OFFICE VISIT (OUTPATIENT)
Dept: INTERNAL MEDICINE CLINIC | Facility: CLINIC | Age: 60
End: 2025-04-08
Payer: COMMERCIAL

## 2025-04-08 ENCOUNTER — RA CDI HCC (OUTPATIENT)
Dept: OTHER | Facility: HOSPITAL | Age: 60
End: 2025-04-08

## 2025-04-08 VITALS
BODY MASS INDEX: 35.46 KG/M2 | WEIGHT: 239.4 LBS | OXYGEN SATURATION: 100 % | DIASTOLIC BLOOD PRESSURE: 70 MMHG | HEIGHT: 69 IN | HEART RATE: 98 BPM | SYSTOLIC BLOOD PRESSURE: 136 MMHG

## 2025-04-08 DIAGNOSIS — Z00.00 MEDICARE ANNUAL WELLNESS VISIT, SUBSEQUENT: Primary | ICD-10-CM

## 2025-04-08 DIAGNOSIS — E66.812 CLASS 2 SEVERE OBESITY DUE TO EXCESS CALORIES WITH SERIOUS COMORBIDITY AND BODY MASS INDEX (BMI) OF 35.0 TO 35.9 IN ADULT (HCC): ICD-10-CM

## 2025-04-08 DIAGNOSIS — M54.42 CHRONIC MIDLINE LOW BACK PAIN WITH BILATERAL SCIATICA: ICD-10-CM

## 2025-04-08 DIAGNOSIS — G89.29 CHRONIC MIDLINE LOW BACK PAIN WITH BILATERAL SCIATICA: ICD-10-CM

## 2025-04-08 DIAGNOSIS — E66.01 CLASS 2 SEVERE OBESITY DUE TO EXCESS CALORIES WITH SERIOUS COMORBIDITY AND BODY MASS INDEX (BMI) OF 35.0 TO 35.9 IN ADULT (HCC): ICD-10-CM

## 2025-04-08 DIAGNOSIS — E11.65 TYPE 2 DIABETES MELLITUS WITH HYPERGLYCEMIA, WITHOUT LONG-TERM CURRENT USE OF INSULIN (HCC): ICD-10-CM

## 2025-04-08 DIAGNOSIS — M70.61 GREATER TROCHANTERIC BURSITIS OF RIGHT HIP: ICD-10-CM

## 2025-04-08 DIAGNOSIS — M54.41 CHRONIC MIDLINE LOW BACK PAIN WITH BILATERAL SCIATICA: ICD-10-CM

## 2025-04-08 PROCEDURE — G0439 PPPS, SUBSEQ VISIT: HCPCS | Performed by: INTERNAL MEDICINE

## 2025-04-08 PROCEDURE — 99214 OFFICE O/P EST MOD 30 MIN: CPT | Performed by: INTERNAL MEDICINE

## 2025-04-08 PROCEDURE — G2211 COMPLEX E/M VISIT ADD ON: HCPCS | Performed by: INTERNAL MEDICINE

## 2025-04-08 RX ORDER — TIRZEPATIDE 15 MG/.5ML
15 INJECTION, SOLUTION SUBCUTANEOUS WEEKLY
Qty: 2 ML | Refills: 0 | Status: SHIPPED | OUTPATIENT
Start: 2025-04-08 | End: 2025-04-09

## 2025-04-08 NOTE — PROGRESS NOTES
"Diabetic Foot Exam    Patient's shoes and socks removed.    Right Foot/Ankle   Right Foot Inspection  Skin Exam: skin normal, skin intact and dry skin. No warmth, no callus, no erythema, no maceration, no abnormal color, no pre-ulcer, no ulcer and no callus.     Toe Exam: ROM and strength within normal limits.     Sensory   Monofilament testing: intact    Vascular  Capillary refills: < 3 seconds  The right DP pulse is 2+. The right PT pulse is 2+.     Left Foot/Ankle  Left Foot Inspection  Skin Exam: skin normal, skin intact and dry skin. No warmth, no erythema, no maceration, normal color, no pre-ulcer, no ulcer and no callus.     Toe Exam: ROM and strength within normal limits.     Sensory   Monofilament testing: intact    Vascular  Capillary refills: < 3 seconds  The left DP pulse is 2+. The left PT pulse is 2+.     Assign Risk Category  No deformity present  No loss of protective sensation  No weak pulses  Risk: 0    Answers submitted by the patient for this visit:  Medicare Annual Wellness Visit (Submitted on 4/3/2025)  How would you rate your overall health?: good  Compared to last year, how is your physical health?: slightly better  In general, how satisfied are you with your life?: satisfied  Compared to last year, how is your eyesight?: same  Compared to last year, how is your hearing?: same  Compared to last year, how is your emotional/mental health?: much better  How often is anger a problem for you?: sometimes  How often do you feel unusually tired/fatigued?: often  In the past 7 days, how much pain have you experienced?: a lot  If you answered \"some\" or \"a lot\", please rate the severity of your pain on a scale of 1 to 10 (1 being the least severe pain and 10 being the most intense pain).: 7/10  In the past 6 months, have you lost or gained 10 pounds without trying?: Yes  Additional Comments: 20 pounds  One or more falls in the last year: No  In the past 6 months, have you accidentally leaked urine?: " No  Do you have trouble with the stairs inside or outside your home?: No  Does your home have working smoke alarms?: Yes  Does your home have a carbon monoxide monitor?: Yes  Which safety hazards (if any) have you experienced in your home? Please select all that apply.: none  How would you describe your current diet? Please select all that apply.: Diabetic, Low Cholesterol, Low Carb  In addition to prescription medications, are you taking any over-the-counter supplements?: Yes  If yes, what supplements are you taking?: Probiotic and prenatal  Can you manage your medications?: Yes  Are you currently taking any opioid medications?: No  Can you walk and transfer into and out of your bed and chair?: Yes  Can you dress and groom yourself?: Yes  Can you bathe or shower yourself?: Yes  Can you feed yourself?: Yes  Can you do your laundry/ housekeeping?: Yes  Can you manage your money, pay your bills, and track your expenses?: Yes  Can you make your own meals?: Yes  Can you do your own shopping?: Yes  Within the last 12 months, have you had any hospitalizations or Emergency Department visits?: No  Do you have a living will?: No  Do you have a Durable POA (Power of ) for healthcare decisions?: No  Do you have an Advanced Directive for end of life decisions?: No  How often have you used an illegal drug (including marijuana) or a prescription medication for non-medical reasons in the past year?: never  What is the typical number of drinks you consume in a day?: 0  What is the typical number of drinks you consume in a week?: 0  How often did you have a drink containing alcohol in the past year?: never  How many drinks did you have on a typical day  when you were drinking in the past year?: 0  How often did you have 6 or more drinks on one occasion in the past year?: never  Name: Kinsey Duffy      : 1965      MRN: [id number]  Encounter Provider: Steven Heart DO  Encounter Date: 2025   Encounter  department: [facility]  :  Assessment & Plan  Medicare annual wellness visit, subsequent  Assessment and plan 1.   Medicare subsequent annual wellness examination overall the patient is clinically stable and doing well, we encouraged the patient to follow a healthy and balanced diet.  We recommend that the patient exercise routinely approximately 30 minutes 5 times per week .  We have reviewed the patient's vaccines and have made recommendations for updates if necessary patient declines all routine vaccines/informed refusal    .  We will be ordering screening laboratories which are age appropriate.  Return to the office in   6 months   call if any problems.       Chronic midline low back pain with bilateral sciatica  Patient reports me worked with pain management underwent epidural steroid injections he did not have further treatment options therefore she went to spine specialist who offered surgical intervention but at this point and the patient does not want to proceed with surgery she does report to me lower back pain which has been chronic and limits her ability to exercise routinely and walk I would like her to see a chiropractor and the clinical pharmacist for topical compound medications consideration  Orders:  •  Ambulatory referral to clinical pharmacy; Future  •  Ambulatory Referral to Chiropractic; Future    Class 2 severe obesity due to excess calories with serious comorbidity and body mass index (BMI) of 35.0 to 35.9 in adult (HCC)  Recent weight gain dietary indiscretions limited activity levels today did  patient about optimizing her diet hopefully if  back pain improved some we can increase her activity levels we will increase the dose of Mounjaro patient is very motivated to make adjustments in her lifestyle    Orders:  •  Tirzepatide (Mounjaro) 15 MG/0.5ML SOAJ; Inject 15 mg under the skin once a week    Type 2 diabetes mellitus with hyperglycemia, without long-term  current use of insulin (HCC)    Lab Results   Component Value Date    HGBA1C 6.6 (H) 04/06/2025   I have counselled the pt to follow a healthy and balanced diet ,and recommend routine exercise.  Will increase Mounjaro to 15 mg subcu weekly diabetic labs in 3 months    Orders:  •  Comprehensive metabolic panel; Future  •  Albumin / creatinine urine ratio; Future  •  Lipid Panel with Direct LDL reflex; Future  •  Hemoglobin A1C; Future  •  Tirzepatide (Mounjaro) 15 MG/0.5ML SOAJ; Inject 15 mg under the skin once a week    Greater trochanteric bursitis of right hip  Will have patient work with chiropractor she also reports radicular symptoms in the right lower extremity numbness and tingling no weakness will have patient work with chiropractor       RTO in 6 months call any problems       History of Present Illness   HPI 60-year old female coming in for a follow up office visit regarding lower back pain chronic, class II severe obesity, type 2 diabetes, greater trochanteric bursitis right hip; the patient reports me compliant taking medications without untoward side effects the.  The patient is here to review his medical condition, update me on the medical condition and the patient reports me no hospitalizations and no ER visits.  No injuries no illnesses the patient is upset with herself because of the weight gain she has gained 20 pounds since last visit she reports many dietary indiscretions she reports me limited activity level secondary to chronic back pain.  She also reports to me pain lateral right hip.  Numbness and tingling right lower extremity no weakness here to review laboratories in detail weight gain , prenatal vit hair healthier , she does report to me she started a prenatal vitamin for her hair it has improved but she has noticed weight gain since this point. Back pain legs numb limits walking patient does report to me going off the Cymbalta her mood is stable  Review of Systems   Constitutional:   "Negative for activity change, appetite change and unexpected weight change.   HENT:  Negative for congestion and postnasal drip.    Eyes:  Negative for visual disturbance.   Respiratory:  Negative for cough and shortness of breath.    Cardiovascular:  Negative for chest pain.   Gastrointestinal:  Negative for abdominal pain, diarrhea, nausea and vomiting.   Neurological:  Negative for dizziness, light-headedness and headaches.   Hematological:  Negative for adenopathy.   Chronic back pain, lumbar radiculopathy and bursitis right hip    Objective   /70 (BP Location: Left arm, Patient Position: Sitting, Cuff Size: Large)   Pulse 98   Ht 5' 8.5\" (1.74 m)   Wt 109 kg (239 lb 6.4 oz)   SpO2 100%   BMI 35.87 kg/m²      Physical Exam  Vitals and nursing note reviewed.   Constitutional:       General: She is not in acute distress.     Appearance: Normal appearance. She is well-developed. She is obese. She is not ill-appearing, toxic-appearing or diaphoretic.   HENT:      Head: Normocephalic.   Eyes:      General: No scleral icterus.        Right eye: No discharge.         Left eye: No discharge.      Conjunctiva/sclera: Conjunctivae normal.      Pupils: Pupils are equal, round, and reactive to light.   Cardiovascular:      Rate and Rhythm: Normal rate and regular rhythm.      Pulses: no weak pulses.           Dorsalis pedis pulses are 2+ on the right side and 2+ on the left side.        Posterior tibial pulses are 2+ on the right side and 2+ on the left side.      Heart sounds: Normal heart sounds. No murmur heard.     No friction rub. No gallop.   Pulmonary:      Effort: No respiratory distress.      Breath sounds: Normal breath sounds. No wheezing or rales.   Abdominal:      General: Bowel sounds are normal. There is no distension.      Palpations: Abdomen is soft. There is no mass.      Tenderness: There is no abdominal tenderness. There is no guarding or rebound.   Musculoskeletal:         General: No " deformity.      Cervical back: Neck supple.   Feet:      Right foot:      Skin integrity: Dry skin present. No ulcer, skin breakdown, erythema, warmth or callus.      Left foot:      Skin integrity: Dry skin present. No ulcer, skin breakdown, erythema, warmth or callus.   Lymphadenopathy:      Cervical: No cervical adenopathy.   Neurological:      Mental Status: She is alert.      Coordination: Coordination normal.   Psychiatric:         Mood and Affect: Mood is not anxious or depressed.         Thought Content: Thought content does not include suicidal ideation.     Negative edema bilateral lower extremity distal pulses 2/2

## 2025-04-08 NOTE — PROGRESS NOTES
Name: Kinsey Duffy      : 1965      MRN: [id number]  Encounter Provider: Steven Heart DO  Encounter Date: 2025   Encounter department: [facility]  :  Assessment & Plan       Preventive health issues were discussed with patient, and age appropriate screening tests were ordered as noted in patient's After Visit Summary. Personalized health advice and appropriate referrals for health education or preventive services given if needed, as noted in patient's After Visit Summary.    History of Present Illness     HPI   Patient Care Team:  Steven Heart DO as PCP - General (Internal Medicine)  IZABELA Goodwin as PCP - [facility]CP-[facility]  Flakito Sullivan MD (Pain Medicine)    Review of Systems  Medical History Reviewed by provider this encounter:       Annual Wellness Visit Questionnaire   Kinsey is here for her Subsequent Wellness visit.     Health Risk Assessment:   Patient rates overall health as good. Patient feels that their physical health rating is slightly better. Patient is satisfied with their life. Eyesight was rated as same. Hearing was rated as same. Patient feels that their emotional and mental health rating is much better. Patients states they are sometimes angry. Patient states they are often unusually tired/fatigued. Pain experienced in the last 7 days has been a lot. Patient's pain rating has been 7/10. Patient states that she has experienced weight loss or gain in last 6 months. 20 pounds    Depression Screening:   PHQ-9 Score: 3      Fall Risk Screening:   In the past year, patient has experienced: no history of falling in past year      Urinary Incontinence Screening:   Patient has not leaked urine accidently in the last six months.     Home Safety:  Patient does not have trouble with stairs inside or outside of their home. Patient has working smoke alarms and has working carbon monoxide detector. Home safety hazards include: none.      Nutrition:   Current diet is Diabetic, Low Cholesterol and Low Carb.     Medications:   Patient is currently taking over-the-counter supplements. OTC medications include: see medication list. Patient is able to manage medications.     Activities of Daily Living (ADLs)/Instrumental Activities of Daily Living (IADLs):   Walk and transfer into and out of bed and chair?: Yes  Dress and groom yourself?: Yes    Bathe or shower yourself?: Yes    Feed yourself? Yes  Do your laundry/housekeeping?: Yes  Manage your money, pay your bills and track your expenses?: Yes  Make your own meals?: Yes    Do your own shopping?: Yes    Previous Hospitalizations:   Any hospitalizations or ED visits within the last 12 months?: No      Advance Care Planning:   Living will: No    Durable POA for healthcare: No    Advanced directive: No      Preventive Screenings      Cardiovascular Screening:    General: Screening Current      Diabetes Screening:     General: Screening Not Indicated and History Diabetes      Breast Cancer Screening:     General: Screening Current      Hepatitis C Screening:    General: Screening Current    Immunizations:  - Immunizations due: Influenza, Prevnar 20 and Zoster (Shingrix)    Screening, Brief Intervention, and Referral to Treatment (SBIRT)     Screening  Typical number of drinks in a day: 0  Typical number of drinks in a week: 0  Interpretation: Low risk drinking behavior.    AUDIT-C Screenin) How often did you have a drink containing alcohol in the past year? never  2) How many drinks did you have on a typical day when you were drinking in the past year? 0  3) How often did you have 6 or more drinks on one occasion in the past year? never    AUDIT-C Score: 0  Interpretation: Score 0-2 (female): Negative screen for alcohol misuse    Single Item Drug Screening:  How often have you used an illegal drug (including marijuana) or a prescription medication for non-medical reasons in the past year?  "never    Single Item Drug Screen Score: 0  Interpretation: Negative screen for possible drug use disorder    Social Drivers of Health     Financial Resource Strain: Low Risk  (11/8/2023)    Overall Financial Resource Strain (CARDIA)     Difficulty of Paying Living Expenses: Not hard at all   Food Insecurity: No Food Insecurity (4/3/2025)    Hunger Vital Sign     Worried About Running Out of Food in the Last Year: Never true     Ran Out of Food in the Last Year: Never true   Transportation Needs: No Transportation Needs (4/3/2025)    PRAPARE - Transportation     Lack of Transportation (Medical): No     Lack of Transportation (Non-Medical): No   Housing Stability: Low Risk  (4/3/2025)    Housing Stability Vital Sign     Unable to Pay for Housing in the Last Year: No     Number of Times Moved in the Last Year: 0     Homeless in the Last Year: No   Utilities: Not At Risk (4/3/2025)    [facility] Utilities     Threatened with loss of utilities: No     No results found.    Objective   /70 (BP Location: Left arm, Patient Position: Sitting, Cuff Size: Large)   Pulse 98   Ht 5' 8.5\" (1.74 m)   Wt 109 kg (239 lb 6.4 oz)   SpO2 100%   BMI 35.87 kg/m²     Physical Exam    "

## 2025-04-08 NOTE — TELEPHONE ENCOUNTER
----- Message from Kadi DACOSTA sent at 4/8/2025  9:47 AM EDT -----  Regarding: Care Gap Request  04/08/25 9:47 AM    Hello, our patient above has had Mammogram completed/performed. Please assist in updating the patient chart by pulling the Care Everywhere (CE) document. The date of service is 2024.     Thank you,  Kadi Sanchez MA  [facility]

## 2025-04-08 NOTE — PATIENT INSTRUCTIONS
Medicare Preventive Visit Patient Instructions  Thank you for completing your Welcome to Medicare Visit or Medicare Annual Wellness Visit today. Your next wellness visit will be due in one year (4/9/2026).  The screening/preventive services that you may require over the next 5-10 years are detailed below. Some tests may not apply to you based off risk factors and/or age. Screening tests ordered at today's visit but not completed yet may show as past due. Also, please note that scanned in results may not display below.  Preventive Screenings:  Service Recommendations Previous Testing/Comments   Colorectal Cancer Screening  * Colonoscopy    * Fecal Occult Blood Test (FOBT)/Fecal Immunochemical Test (FIT)  * Fecal DNA/Cologuard Test  * Flexible Sigmoidoscopy Age: 45-75 years old   Colonoscopy: every 10 years (may be performed more frequently if at higher risk)  OR  FOBT/FIT: every 1 year  OR  Cologuard: every 3 years  OR  Sigmoidoscopy: every 5 years  Screening may be recommended earlier than age 45 if at higher risk for colorectal cancer. Also, an individualized decision between you and your healthcare provider will decide whether screening between the ages of 76-85 would be appropriate. Colonoscopy: Not on file  FOBT/FIT: Not on file  Cologuard: Not on file  Sigmoidoscopy: Not on file          Breast Cancer Screening Age: 40+ years old  Frequency: every 1-2 years  Not required if history of left and right mastectomy Mammogram: 08/24/2023        Cervical Cancer Screening Between the ages of 21-29, pap smear recommended once every 3 years.   Between the ages of 30-65, can perform pap smear with HPV co-testing every 5 years.   Recommendations may differ for women with a history of total hysterectomy, cervical cancer, or abnormal pap smears in past. Pap Smear: Not on file        Hepatitis C Screening Once for adults born between 1945 and 1965  More frequently in patients at high risk for Hepatitis C Hep C Antibody:  12/22/2022        Diabetes Screening 1-2 times per year if you're at risk for diabetes or have pre-diabetes Fasting glucose: 110 mg/dL (4/6/2025)  A1C: 6.6 % (4/6/2025)      Cholesterol Screening Once every 5 years if you don't have a lipid disorder. May order more often based on risk factors. Lipid panel: 04/06/2025          Other Preventive Screenings Covered by Medicare:  Abdominal Aortic Aneurysm (AAA) Screening: covered once if your at risk. You're considered to be at risk if you have a family history of AAA.  Lung Cancer Screening: covers low dose CT scan once per year if you meet all of the following conditions: (1) Age 55-77; (2) No signs or symptoms of lung cancer; (3) Current smoker or have quit smoking within the last 15 years; (4) You have a tobacco smoking history of at least 20 pack years (packs per day multiplied by number of years you smoked); (5) You get a written order from a healthcare provider.  Glaucoma Screening: covered annually if you're considered high risk: (1) You have diabetes OR (2) Family history of glaucoma OR (3)  aged 50 and older OR (4)  American aged 65 and older  Osteoporosis Screening: covered every 2 years if you meet one of the following conditions: (1) You're estrogen deficient and at risk for osteoporosis based off medical history and other findings; (2) Have a vertebral abnormality; (3) On glucocorticoid therapy for more than 3 months; (4) Have primary hyperparathyroidism; (5) On osteoporosis medications and need to assess response to drug therapy.   Last bone density test (DXA Scan): Not on file.  HIV Screening: covered annually if you're between the age of 15-65. Also covered annually if you are younger than 15 and older than 65 with risk factors for HIV infection. For pregnant patients, it is covered up to 3 times per pregnancy.    Immunizations:  Immunization Recommendations   Influenza Vaccine Annual influenza vaccination during flu season is  recommended for all persons aged >= 6 months who do not have contraindications   Pneumococcal Vaccine   * Pneumococcal conjugate vaccine = PCV13 (Prevnar 13), PCV15 (Vaxneuvance), PCV20 (Prevnar 20)  * Pneumococcal polysaccharide vaccine = PPSV23 (Pneumovax) Adults 19-63 yo with certain risk factors or if 65+ yo  If never received any pneumonia vaccine: recommend Prevnar 20 (PCV20)  Give PCV20 if previously received 1 dose of PCV13 or PPSV23   Hepatitis B Vaccine 3 dose series if at intermediate or high risk (ex: diabetes, end stage renal disease, liver disease)   Respiratory syncytial virus (RSV) Vaccine - COVERED BY MEDICARE PART D  * RSVPreF3 (Arexvy) CDC recommends that adults 60 years of age and older may receive a single dose of RSV vaccine using shared clinical decision-making (SCDM)   Tetanus (Td) Vaccine - COST NOT COVERED BY MEDICARE PART B Following completion of primary series, a booster dose should be given every 10 years to maintain immunity against tetanus. Td may also be given as tetanus wound prophylaxis.   Tdap Vaccine - COST NOT COVERED BY MEDICARE PART B Recommended at least once for all adults. For pregnant patients, recommended with each pregnancy.   Shingles Vaccine (Shingrix) - COST NOT COVERED BY MEDICARE PART B  2 shot series recommended in those 19 years and older who have or will have weakened immune systems or those 50 years and older     Health Maintenance Due:      Topic Date Due   • HIV Screening  Never done   • Cervical Cancer Screening  Never done   • Colorectal Cancer Screening  Never done   • Breast Cancer Screening: Mammogram  08/24/2024   • Hepatitis C Screening  Completed     Immunizations Due:      Topic Date Due   • Pneumococcal Vaccine: Pediatrics (0 to 5 Years) and At-Risk Patients (6 to 64 Years) (1 of 2 - PCV) Never done   • Influenza Vaccine (1) Never done   • COVID-19 Vaccine (1 - 2024-25 season) Never done     Advance Directives   What are advance directives?  Advance  directives are legal documents that state your wishes and plans for medical care. These plans are made ahead of time in case you lose your ability to make decisions for yourself. Advance directives can apply to any medical decision, such as the treatments you want, and if you want to donate organs.   What are the types of advance directives?  There are many types of advance directives, and each state has rules about how to use them. You may choose a combination of any of the following:  Living will:  This is a written record of the treatment you want. You can also choose which treatments you do not want, which to limit, and which to stop at a certain time. This includes surgery, medicine, IV fluid, and tube feedings.   Durable power of  for healthcare (DPAHC):  This is a written record that states who you want to make healthcare choices for you when you are unable to make them for yourself. This person, called a proxy, is usually a family member or a friend. You may choose more than 1 proxy.  Do not resuscitate (DNR) order:  A DNR order is used in case your heart stops beating or you stop breathing. It is a request not to have certain forms of treatment, such as CPR. A DNR order may be included in other types of advance directives.  Medical directive:  This covers the care that you want if you are in a coma, near death, or unable to make decisions for yourself. You can list the treatments you want for each condition. Treatment may include pain medicine, surgery, blood transfusions, dialysis, IV or tube feedings, and a ventilator (breathing machine).  Values history:  This document has questions about your views, beliefs, and how you feel and think about life. This information can help others choose the care that you would choose.  Why are advance directives important?  An advance directive helps you control your care. Although spoken wishes may be used, it is better to have your wishes written down. Spoken  wishes can be misunderstood, or not followed. Treatments may be given even if you do not want them. An advance directive may make it easier for your family to make difficult choices about your care.   Weight Management   Why it is important to manage your weight:  Being overweight increases your risk of health conditions such as heart disease, high blood pressure, type 2 diabetes, and certain types of cancer. It can also increase your risk for osteoarthritis, sleep apnea, and other respiratory problems. Aim for a slow, steady weight loss. Even a small amount of weight loss can lower your risk of health problems.  How to lose weight safely:  A safe and healthy way to lose weight is to eat fewer calories and get regular exercise. You can lose up about 1 pound a week by decreasing the number of calories you eat by 500 calories each day.   Healthy meal plan for weight management:  A healthy meal plan includes a variety of foods, contains fewer calories, and helps you stay healthy. A healthy meal plan includes the following:  Eat whole-grain foods more often.  A healthy meal plan should contain fiber. Fiber is the part of grains, fruits, and vegetables that is not broken down by your body. Whole-grain foods are healthy and provide extra fiber in your diet. Some examples of whole-grain foods are whole-wheat breads and pastas, oatmeal, brown rice, and bulgur.  Eat a variety of vegetables every day.  Include dark, leafy greens such as spinach, kale, lolis greens, and mustard greens. Eat yellow and orange vegetables such as carrots, sweet potatoes, and winter squash.   Eat a variety of fruits every day.  Choose fresh or canned fruit (canned in its own juice or light syrup) instead of juice. Fruit juice has very little or no fiber.  Eat low-fat dairy foods.  Drink fat-free (skim) milk or 1% milk. Eat fat-free yogurt and low-fat cottage cheese. Try low-fat cheeses such as mozzarella and other reduced-fat cheeses.  Choose  meat and other protein foods that are low in fat.  Choose beans or other legumes such as split peas or lentils. Choose fish, skinless poultry (chicken or turkey), or lean cuts of red meat (beef or pork). Before you cook meat or poultry, cut off any visible fat.   Use less fat and oil.  Try baking foods instead of frying them. Add less fat, such as margarine, sour cream, regular salad dressing and mayonnaise to foods. Eat fewer high-fat foods. Some examples of high-fat foods include french fries, doughnuts, ice cream, and cakes.  Eat fewer sweets.  Limit foods and drinks that are high in sugar. This includes candy, cookies, regular soda, and sweetened drinks.  Exercise:  Exercise at least 30 minutes per day on most days of the week. Some examples of exercise include walking, biking, dancing, and swimming. You can also fit in more physical activity by taking the stairs instead of the elevator or parking farther away from stores. Ask your healthcare provider about the best exercise plan for you.      © Copyright [employer] 2018 Information is for End User's use only and may not be sold, redistributed or otherwise used for commercial purposes. All illustrations and images included in CareNotes® are the copyrighted property of [employer] Inc[employer] or [employer]

## 2025-04-08 NOTE — TELEPHONE ENCOUNTER
Upon review of the In Basket request we were able to locate, review, and update the patient chart as requested for Mammogram.    Any additional questions or concerns should be emailed to the Practice Liaisons via the appropriate education email address, please do not reply via In Basket.    Thank you  Owen Morales MA   [facility]

## 2025-04-08 NOTE — ASSESSMENT & PLAN NOTE
Lab Results   Component Value Date    HGBA1C 6.6 (H) 04/06/2025   I have counselled the pt to follow a healthy and balanced diet ,and recommend routine exercise.  Will increase Mounjaro to 15 mg subcu weekly diabetic labs in 3 months    Orders:  •  Comprehensive metabolic panel; Future  •  Albumin / creatinine urine ratio; Future  •  Lipid Panel with Direct LDL reflex; Future  •  Hemoglobin A1C; Future  •  Tirzepatide (Mounjaro) 15 MG/0.5ML SOAJ; Inject 15 mg under the skin once a week

## 2025-04-08 NOTE — ASSESSMENT & PLAN NOTE
Recent weight gain dietary indiscretions limited activity levels today did  patient about optimizing her diet hopefully if  back pain improved some we can increase her activity levels we will increase the dose of Mounjaro patient is very motivated to make adjustments in her lifestyle    Orders:  •  Tirzepatide (Mounjaro) 15 MG/0.5ML SOAJ; Inject 15 mg under the skin once a week

## 2025-04-09 RX ORDER — TIRZEPATIDE 15 MG/.5ML
15 INJECTION, SOLUTION SUBCUTANEOUS WEEKLY
Qty: 0.5 ML | Refills: 0 | Status: SHIPPED | OUTPATIENT
Start: 2025-04-09 | End: 2025-04-16 | Stop reason: DRUGHIGH

## 2025-04-10 ENCOUNTER — TELEPHONE (OUTPATIENT)
Dept: INTERNAL MEDICINE CLINIC | Facility: CLINIC | Age: 60
End: 2025-04-10

## 2025-04-10 NOTE — TELEPHONE ENCOUNTER
Pharmacy sent a message regarding Mounjaro.  Patient was previously on 10mg, provider sent in 15mg. The next dosage is 12.5mg. Please send new prescription to [facility]/pharmacy #[id number] - [place], [place] - [address]

## 2025-04-11 DIAGNOSIS — E11.9 TYPE 2 DIABETES MELLITUS WITHOUT COMPLICATION, WITHOUT LONG-TERM CURRENT USE OF INSULIN (HCC): Primary | ICD-10-CM

## 2025-04-11 RX ORDER — TIRZEPATIDE 12.5 MG/.5ML
12.5 INJECTION, SOLUTION SUBCUTANEOUS WEEKLY
Qty: 2 ML | Refills: 0 | Status: SHIPPED | OUTPATIENT
Start: 2025-04-11

## 2025-04-14 DIAGNOSIS — E66.812 CLASS 2 SEVERE OBESITY DUE TO EXCESS CALORIES WITH SERIOUS COMORBIDITY AND BODY MASS INDEX (BMI) OF 35.0 TO 35.9 IN ADULT (HCC): ICD-10-CM

## 2025-04-14 DIAGNOSIS — E66.01 CLASS 2 SEVERE OBESITY DUE TO EXCESS CALORIES WITH SERIOUS COMORBIDITY AND BODY MASS INDEX (BMI) OF 35.0 TO 35.9 IN ADULT (HCC): ICD-10-CM

## 2025-04-14 DIAGNOSIS — E11.65 TYPE 2 DIABETES MELLITUS WITH HYPERGLYCEMIA, WITHOUT LONG-TERM CURRENT USE OF INSULIN (HCC): ICD-10-CM

## 2025-04-15 RX ORDER — TIRZEPATIDE 15 MG/.5ML
15 INJECTION, SOLUTION SUBCUTANEOUS WEEKLY
Qty: 0.5 ML | Refills: 0 | OUTPATIENT
Start: 2025-04-15

## 2025-04-16 ENCOUNTER — OFFICE VISIT (OUTPATIENT)
Dept: INTERNAL MEDICINE CLINIC | Facility: CLINIC | Age: 60
End: 2025-04-16

## 2025-04-16 DIAGNOSIS — G89.29 CHRONIC MIDLINE LOW BACK PAIN WITH BILATERAL SCIATICA: ICD-10-CM

## 2025-04-16 DIAGNOSIS — M54.41 CHRONIC MIDLINE LOW BACK PAIN WITH BILATERAL SCIATICA: ICD-10-CM

## 2025-04-16 DIAGNOSIS — M54.16 LUMBAR RADICULOPATHY: Primary | ICD-10-CM

## 2025-04-16 DIAGNOSIS — M54.42 CHRONIC MIDLINE LOW BACK PAIN WITH BILATERAL SCIATICA: ICD-10-CM

## 2025-04-16 DIAGNOSIS — F33.9 EPISODE OF RECURRENT MAJOR DEPRESSIVE DISORDER, UNSPECIFIED DEPRESSION EPISODE SEVERITY (HCC): ICD-10-CM

## 2025-04-16 PROCEDURE — PBNCHG PB NO CHARGE PLACEHOLDER: Performed by: PHARMACIST

## 2025-04-16 NOTE — PROGRESS NOTES
Patient referred for pain compound discussion:     Description of Pain  Nerve, arthritis - reports large family history of arthritis   Back, radiating sometimes, possible hip bursa  Concurrent depression   Using OTC - Absorbing Jr Pro, works well but gives her a headache - topical Lidocaine, Phenol    Tried/Failed  Duloxetine (nausea)  Gabapentin (ineffective)  Epidural injections (ineffective)  Not interested in medical marijuana (recovering alcoholic, coming up on 2 years sober!)  Not interested in surgical intervention    Pending paper RX sending to [facility] (will be scanned in media).     Patient also with concurrent depression and nerve pain. Pended Amitriptyline for PCP consideration. Duloxetine made her too nauseous.

## 2025-04-17 ENCOUNTER — OFFICE VISIT (OUTPATIENT)
Age: 60
End: 2025-04-17
Payer: COMMERCIAL

## 2025-04-17 VITALS
BODY MASS INDEX: 35.4 KG/M2 | HEIGHT: 69 IN | HEART RATE: 83 BPM | WEIGHT: 239 LBS | SYSTOLIC BLOOD PRESSURE: 122 MMHG | DIASTOLIC BLOOD PRESSURE: 84 MMHG

## 2025-04-17 DIAGNOSIS — G89.29 CHRONIC MIDLINE LOW BACK PAIN WITH BILATERAL SCIATICA: ICD-10-CM

## 2025-04-17 DIAGNOSIS — M99.04 SEGMENTAL DYSFUNCTION OF SACRAL REGION: ICD-10-CM

## 2025-04-17 DIAGNOSIS — M47.816 LUMBAR SPONDYLOSIS: ICD-10-CM

## 2025-04-17 DIAGNOSIS — M99.02 SEGMENTAL DYSFUNCTION OF THORACIC REGION: ICD-10-CM

## 2025-04-17 DIAGNOSIS — M54.41 CHRONIC MIDLINE LOW BACK PAIN WITH BILATERAL SCIATICA: ICD-10-CM

## 2025-04-17 DIAGNOSIS — M99.03 SEGMENTAL DYSFUNCTION OF LUMBAR REGION: Primary | ICD-10-CM

## 2025-04-17 DIAGNOSIS — M54.42 CHRONIC MIDLINE LOW BACK PAIN WITH BILATERAL SCIATICA: ICD-10-CM

## 2025-04-17 PROCEDURE — 99203 OFFICE O/P NEW LOW 30 MIN: CPT | Performed by: CHIROPRACTOR

## 2025-04-17 PROCEDURE — 98941 CHIROPRACT MANJ 3-4 REGIONS: CPT | Performed by: CHIROPRACTOR

## 2025-04-17 NOTE — PROGRESS NOTES
Initial date of service: 4/17/25    Diagnoses and all orders for this visit:    Segmental dysfunction of lumbar region    Chronic midline low back pain with bilateral sciatica  -     Ambulatory Referral to Chiropractic    Segmental dysfunction of sacral region    Segmental dysfunction of thoracic region       ASSESSMENT:  No red flags, radiculopathy or neurologic deficit appreciated clinically. Pt's symptoms and exam findings consistent with mechanical lbp secondary to repetitive st/sp injury, exacerbated by postural/ergonomic stressors. Pt responded well to flexion biased stretches and manual mobilization of the affected spinal and myofascial tissues with increased ROM; trial of conservative tx recommended consisting of stretching, graded mobilization/manipulation of the affected spinal and myofascial jt dysfunction, postural/ergonomic education and take home stretches/exercises. If symptoms fail to improve with short trial of conservative care, appropriate imaging and referral will be coordinated.  Spent greater than 30 min c pt discussing hx, pe, ddx, tx options and reviewing notes/imaging    PROCEDURE CODES: 89349-AI, [id number]    TREATMENT:  Fear avoidance behavior discussion; encouraged and reassured pt that natural course of condition is to improve over time with adherence to tx plan and home care strategies. Home care recommendations: avoid bed rest, walk (but avoid trails and uneven surfaces), gradual return to activity to tolerance (avoid anything that peripheralizes symptoms), call if symptoms peripheralize, worsen, or neurologic deficit progresses. Ther-ex: IASTM; discussed post procedure soreness and/or ecchymosis for up to 36 hrs, applied to affected mm hypertonicities; supine hamstring stretch, supine gluteal stretch, side laying QL stretch, single knee to chest stretch, hip flexor pin-and-stretch, alternating prone hip extension, glute bridge, transitional mvmt education, abdominal bracing; greater  than 15 min spent performing above mentioned ther-ex to improve ROM/flexibility. Thoracic mobilization: prone P-A mob; Lumbar mobilization: diversified side laying graded HVLA, flexion-traction; SIJ Mobilization: R/L SIJ HVLA - long axis distraction,     HPI:  Kinsey Duffy is a 60 y.o. female  Chief Complaint   Patient presents with   • Back Pain     Lower lumbar pain that radiates down right buttock, right hip and down right foot. Patient states sharp and shooting pain. Patient states walking too long is hard.       The patient presents to the office with chronic lower back pain without trauma,. 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 The patient has had previous treatment including Dr. Sullivan- nerve blocks- helped some.Its been a while. Meds including Tizanidine helped a little but groggy.  PT back in 2022- had to stop due to old health insurance. She can no longer walk and hike like she wants. Currently on disability, can clean for about 30 mins before pain. Tries to break up cleaning bc of the pain. The patient was referred to our office by Steven Heart DO for consult. 9/16/21- MRI Lumbar- 1.  Acute versus subacute L2 superior endplate vertebral compression deformity with moderate loss of vertebral body height.  No significant retropulsion. 2.  Spondylotic changes of the lumbar spine resulting in severe right and moderate left L3-4 foraminal encroachment concerning for right greater than left L3 exiting nerve root impingement.  Correlate with clinical symptoms.  There is also grade 1 anterolisthesis at this level secondary to facet arthrosis similar to prior outside MR study dated 4/26/2019. Pain level range is 8-10/10.         Back Pain  Pertinent negatives include no chest pain, fever, headaches, numbness or weakness.     Past Medical History:   Diagnosis Date   • Alcoholism (HCC)     Quit 2 months ago   • Anemia 6/2010    I was diagnosed in 2010 after gastric bypass   •  Anxiety    • Arthritis    • Callus Years    Just became infected 2 days ago   • Diabetes mellitus (HCC) 2000   • Low back pain    • Lumbosacral disc disease 2019   • Non-toxic multinodular goiter    • Obesity     Since birth   • Osteoarthritis 2019   • Peripheral neuropathy 2018      Past Surgical History:   Procedure Laterality Date   • APPENDECTOMY  11/2010    A few weeks after my bypass   • BARIATRIC SURGERY  2010   • EPIDURAL BLOCK INJECTION  2019   • EYE SURGERY  10/2018    Cataracts removed   • LYMPH NODE BIOPSY     • STOMACH SURGERY      for morbid obesity / last assessed 9/10/12   • TRIGGER POINT INJECTION  2019   • US GUIDED THYROID BIOPSY      biopsy thyroid using percutaneous core needle    • US GUIDED THYROID BIOPSY  07/01/2020     The following portions of the patient's history were reviewed and updated as appropriate: allergies, past family history, past medical history, past social history, past surgical history, and problem list.  Review of Systems   Constitutional:  Negative for activity change, fatigue, fever and unexpected weight change.   HENT:  Negative for ear pain, hearing loss, sinus pressure, sinus pain, sore throat and tinnitus.    Respiratory:  Negative for chest tightness, shortness of breath, wheezing and stridor.    Cardiovascular:  Negative for chest pain.   Genitourinary:  Negative for flank pain and frequency.   Musculoskeletal:  Positive for arthralgias, back pain and myalgias. Negative for joint swelling, neck pain and neck stiffness.   Skin:  Negative for color change and pallor.   Neurological:  Negative for dizziness, speech difficulty, weakness, numbness and headaches.   Psychiatric/Behavioral:  Negative for agitation and sleep disturbance. The patient is not nervous/anxious.      Physical Exam  Constitutional:       General: She is not in acute distress.     Appearance: Normal appearance.   HENT:      Head: Normocephalic.      Mouth/Throat:      Mouth: Mucous membranes are  moist.   Eyes:      Extraocular Movements: Extraocular movements intact.      Conjunctiva/sclera: Conjunctivae normal.      Pupils: Pupils are equal, round, and reactive to light.   Neck:      Vascular: No carotid bruit.   Pulmonary:      Effort: Pulmonary effort is normal.   Chest:      Chest wall: No tenderness.   Abdominal:      General: Abdomen is flat.      Palpations: Abdomen is soft.   Musculoskeletal:         General: Tenderness present. No swelling, deformity or signs of injury. Normal range of motion.        Arms:       Cervical back: Normal range of motion. No rigidity or tenderness.      Right lower leg: No edema.      Left lower leg: No edema.        Legs:    Lymphadenopathy:      Cervical: No cervical adenopathy.   Skin:     General: Skin is warm.      Coloration: Skin is not jaundiced or pale.      Findings: No bruising or erythema.   Neurological:      Mental Status: She is alert and oriented to person, place, and time.      Cranial Nerves: No cranial nerve deficit.      Sensory: No sensory deficit.      Motor: No weakness.      Gait: Gait is intact.      Deep Tendon Reflexes: Reflexes are normal and symmetric.   Psychiatric:         Attention and Perception: Attention normal.         Mood and Affect: Mood and affect normal.         Speech: Speech normal.         Behavior: Behavior normal. Behavior is cooperative.         Thought Content: Thought content normal.         Cognition and Memory: Cognition normal.         Judgment: Judgment normal.       SOFT TISSUE ASSESSMENT Hypertonicity and tenderness palpated B T10-S1 erector spinae, hip flexor, glute med/min, QL, hamstring JOINT RESTRICTIONS: T10-S1 and R SIJ ORTHO: SI jt point tenderness: +; Yajaira unremarkable for centralization/peripheralization; jonh's, iliac compression, thigh thrust elicit lbp in R/L SIJ; prone femoral nerve stretch neg for upper lumbar neural tension, elicits R/L SIJ stiffness; sitting root elicits no lbp on R/L; slump  test elicits no neural tension R/L    Return in about 1 week (around 4/24/2025) for Recheck.

## 2025-04-22 ENCOUNTER — DOCUMENTATION (OUTPATIENT)
Dept: ADMINISTRATIVE | Facility: OTHER | Age: 60
End: 2025-04-22

## 2025-04-22 NOTE — PROGRESS NOTES
04/22/25 11:28 AM    Annual Wellness Visit outreach is not required, an AWV was completed at the PCP office.    Thank you.  oCdy Salinas MA  [facility]

## 2025-04-24 ENCOUNTER — PROCEDURE VISIT (OUTPATIENT)
Age: 60
End: 2025-04-24
Payer: COMMERCIAL

## 2025-04-24 VITALS — HEIGHT: 69 IN | WEIGHT: 239 LBS | BODY MASS INDEX: 35.4 KG/M2

## 2025-04-24 DIAGNOSIS — M47.816 LUMBAR SPONDYLOSIS: ICD-10-CM

## 2025-04-24 DIAGNOSIS — M54.41 CHRONIC MIDLINE LOW BACK PAIN WITH BILATERAL SCIATICA: Primary | ICD-10-CM

## 2025-04-24 DIAGNOSIS — M99.04 SEGMENTAL DYSFUNCTION OF SACRAL REGION: ICD-10-CM

## 2025-04-24 DIAGNOSIS — M99.02 SEGMENTAL DYSFUNCTION OF THORACIC REGION: ICD-10-CM

## 2025-04-24 DIAGNOSIS — M54.42 CHRONIC MIDLINE LOW BACK PAIN WITH BILATERAL SCIATICA: Primary | ICD-10-CM

## 2025-04-24 DIAGNOSIS — M99.03 SEGMENTAL DYSFUNCTION OF LUMBAR REGION: ICD-10-CM

## 2025-04-24 DIAGNOSIS — G89.29 CHRONIC MIDLINE LOW BACK PAIN WITH BILATERAL SCIATICA: Primary | ICD-10-CM

## 2025-04-24 PROCEDURE — 98941 CHIROPRACT MANJ 3-4 REGIONS: CPT | Performed by: CHIROPRACTOR

## 2025-04-24 NOTE — PROGRESS NOTES
Initial date of service: 4/17/25    Diagnoses and all orders for this visit:    Chronic midline low back pain with bilateral sciatica    Lumbar spondylosis    Segmental dysfunction of lumbar region    Segmental dysfunction of sacral region    Segmental dysfunction of thoracic region       ASSESSMENT:   Pt's symptoms and exam findings consistent with mechanical lbp secondary to repetitive st/sp injury, exacerbated by postural/ergonomic stressors. Pt responded well to flexion biased stretches and manual mobilization of the affected spinal and myofascial tissues with increased ROM; trial of conservative tx recommended consisting of stretching, graded mobilization/manipulation of the affected spinal and myofascial jt dysfunction, postural/ergonomic education and take home stretches/exercises. If symptoms fail to improve with short trial of conservative care, appropriate imaging and referral will be coordinated.  4/24/25- The patient tolerated treatment well with decrease in pain and mm spasm    PROCEDURE CODES: 36230-QP    TREATMENT:  Fear avoidance behavior discussion; encouraged and reassured pt that natural course of condition is to improve over time with adherence to tx plan and home care strategies. Home care recommendations: avoid bed rest, walk (but avoid trails and uneven surfaces), gradual return to activity to tolerance (avoid anything that peripheralizes symptoms), call if symptoms peripheralize, worsen, or neurologic deficit progresses. Ther-ex: IASTM; discussed post procedure soreness and/or ecchymosis for up to 36 hrs, applied to affected mm hypertonicities; supine hamstring stretch, supine gluteal stretch, side laying QL stretch, single knee to chest stretch, hip flexor pin-and-stretch, alternating prone hip extension, glute bridge, transitional mvmt education, abdominal bracing; greater than 15 min spent performing above mentioned ther-ex to improve ROM/flexibility. Thoracic mobilization: prone P-A mob;  Lumbar mobilization: diversified side laying graded HVLA, flexion-traction; SIJ Mobilization: R/L SIJ HVLA - long axis distraction,     HPI:  Kinsey Duffy is a 60 y.o. female  Chief Complaint   Patient presents with   • Back Pain     Lower lumbar pain score 4   Patient states feeling slightly better       The patient presents to the office with chronic lower back pain without trauma,. 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 The patient has had previous treatment including Dr. Sullivan- nerve blocks- helped some.Its been a while. Meds including Tizanidine helped a little but groggy.  PT back in 2022- had to stop due to old health insurance. She can no longer walk and hike like she wants. Currently on disability, can clean for about 30 mins before pain. Tries to break up cleaning bc of the pain. The patient was referred to our office by Steven Heart DO for consult. 9/16/21- MRI Lumbar- 1.  Acute versus subacute L2 superior endplate vertebral compression deformity with moderate loss of vertebral body height.  No significant retropulsion. 2.  Spondylotic changes of the lumbar spine resulting in severe right and moderate left L3-4 foraminal encroachment concerning for right greater than left L3 exiting nerve root impingement.  Correlate with clinical symptoms.  There is also grade 1 anterolisthesis at this level secondary to facet arthrosis similar to prior outside MR study dated 4/26/2019. Pain level range is 8-10/10.  4/24/25- The patient has been trying to walk more, she is using Lidocaine cream. 8/10         Back Pain      Past Medical History:   Diagnosis Date   • Alcoholism (HCC)     Quit 2 months ago   • Anemia 6/2010    I was diagnosed in 2010 after gastric bypass   • Anxiety    • Arthritis    • Callus Years    Just became infected 2 days ago   • Diabetes mellitus (HCC) 2000   • Low back pain    • Lumbosacral disc disease 2019   • Non-toxic multinodular goiter    • Obesity      Since birth   • Osteoarthritis 2019   • Peripheral neuropathy 2018      Past Surgical History:   Procedure Laterality Date   • APPENDECTOMY  11/2010    A few weeks after my bypass   • BARIATRIC SURGERY  2010   • EPIDURAL BLOCK INJECTION  2019   • EYE SURGERY  10/2018    Cataracts removed   • LYMPH NODE BIOPSY     • STOMACH SURGERY      for morbid obesity / last assessed 9/10/12   • TRIGGER POINT INJECTION  2019   • US GUIDED THYROID BIOPSY      biopsy thyroid using percutaneous core needle    • US GUIDED THYROID BIOPSY  07/01/2020     The following portions of the patient's history were reviewed and updated as appropriate: allergies, past family history, past medical history, past social history, past surgical history, and problem list.  Review of Systems   Musculoskeletal:  Positive for arthralgias, back pain and myalgias.     Physical Exam  Constitutional:       Appearance: Normal appearance.   Musculoskeletal:         General: Tenderness present. Normal range of motion.        Arms:       Cervical back: Normal range of motion.        Legs:    Neurological:      Mental Status: She is alert and oriented to person, place, and time.      Gait: Gait is intact.      Deep Tendon Reflexes: Reflexes are normal and symmetric.   Psychiatric:         Attention and Perception: Attention normal.         Mood and Affect: Mood and affect normal.         Speech: Speech normal.         Behavior: Behavior normal. Behavior is cooperative.         Thought Content: Thought content normal.         Cognition and Memory: Cognition normal.         Judgment: Judgment normal.       SOFT TISSUE ASSESSMENT Hypertonicity and tenderness palpated B T10-S1 erector spinae, hip flexor, glute med/min, QL, hamstring JOINT RESTRICTIONS: T10-S1 and R SIJ ORTHO: SI jt point tenderness: +; Yajaira unremarkable for centralization/peripheralization; jonh's, iliac compression, thigh thrust elicit lbp in R/L SIJ; prone femoral nerve stretch neg for upper  lumbar neural tension, elicits R/L SIJ stiffness; sitting root elicits no lbp on R/L; slump test elicits no neural tension R/L    Return in about 1 week (around 5/1/2025) for Recheck.

## 2025-05-08 ENCOUNTER — PROCEDURE VISIT (OUTPATIENT)
Age: 60
End: 2025-05-08
Payer: COMMERCIAL

## 2025-05-08 VITALS
WEIGHT: 239 LBS | BODY MASS INDEX: 35.4 KG/M2 | SYSTOLIC BLOOD PRESSURE: 145 MMHG | DIASTOLIC BLOOD PRESSURE: 86 MMHG | HEIGHT: 69 IN

## 2025-05-08 DIAGNOSIS — M99.04 SEGMENTAL DYSFUNCTION OF SACRAL REGION: ICD-10-CM

## 2025-05-08 DIAGNOSIS — M54.41 CHRONIC MIDLINE LOW BACK PAIN WITH BILATERAL SCIATICA: Primary | ICD-10-CM

## 2025-05-08 DIAGNOSIS — M99.03 SEGMENTAL DYSFUNCTION OF LUMBAR REGION: ICD-10-CM

## 2025-05-08 DIAGNOSIS — M54.42 CHRONIC MIDLINE LOW BACK PAIN WITH BILATERAL SCIATICA: Primary | ICD-10-CM

## 2025-05-08 DIAGNOSIS — G89.29 CHRONIC MIDLINE LOW BACK PAIN WITH BILATERAL SCIATICA: Primary | ICD-10-CM

## 2025-05-08 DIAGNOSIS — M47.816 LUMBAR SPONDYLOSIS: ICD-10-CM

## 2025-05-08 DIAGNOSIS — M99.02 SEGMENTAL DYSFUNCTION OF THORACIC REGION: ICD-10-CM

## 2025-05-08 PROCEDURE — 98941 CHIROPRACT MANJ 3-4 REGIONS: CPT | Performed by: CHIROPRACTOR

## 2025-05-08 NOTE — PROGRESS NOTES
Initial date of service: 4/17/25    Diagnoses and all orders for this visit:    Chronic midline low back pain with bilateral sciatica    Lumbar spondylosis    Segmental dysfunction of lumbar region    Segmental dysfunction of sacral region    Segmental dysfunction of thoracic region       ASSESSMENT:   Pt's symptoms and exam findings consistent with mechanical lbp secondary to repetitive st/sp injury, exacerbated by postural/ergonomic stressors. Pt responded well to flexion biased stretches and manual mobilization of the affected spinal and myofascial tissues with increased ROM; trial of conservative tx recommended consisting of stretching, graded mobilization/manipulation of the affected spinal and myofascial jt dysfunction, postural/ergonomic education and take home stretches/exercises. If symptoms fail to improve with short trial of conservative care, appropriate imaging and referral will be coordinated.  4/24/25- The patient tolerated treatment well with decrease in pain and mm spasm    PROCEDURE CODES: [id number]    TREATMENT:  Fear avoidance behavior discussion; encouraged and reassured pt that natural course of condition is to improve over time with adherence to tx plan and home care strategies. Home care recommendations: avoid bed rest, walk (but avoid trails and uneven surfaces), gradual return to activity to tolerance (avoid anything that peripheralizes symptoms), call if symptoms peripheralize, worsen, or neurologic deficit progresses. Ther-ex: IASTM; discussed post procedure soreness and/or ecchymosis for up to 36 hrs, applied to affected mm hypertonicities; supine hamstring stretch, supine gluteal stretch, side laying QL stretch, single knee to chest stretch, hip flexor pin-and-stretch, alternating prone hip extension, glute bridge, transitional mvmt education, abdominal bracing; greater than 15 min spent performing above mentioned ther-ex to improve ROM/flexibility. Thoracic mobilization: prone P-A mob;  Lumbar mobilization: diversified side laying graded HVLA, flexion-traction; SIJ Mobilization: R/L SIJ HVLA - long axis distraction,     HPI:  Kinsey Duffy is a 60 y.o. female  Chief Complaint   Patient presents with   • Back Pain     Low back pain bilateral about a 3       The patient presents to the office with chronic lower back pain without trauma,. 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 The patient has had previous treatment including Dr. Sullivan- nerve blocks- helped some.Its been a while. Meds including Tizanidine helped a little but groggy.  PT back in 2022- had to stop due to old health insurance. She can no longer walk and hike like she wants. Currently on disability, can clean for about 30 mins before pain. Tries to break up cleaning bc of the pain. The patient was referred to our office by Steven Heart DO for consult. 9/16/21- MRI Lumbar- 1.  Acute versus subacute L2 superior endplate vertebral compression deformity with moderate loss of vertebral body height.  No significant retropulsion. 2.  Spondylotic changes of the lumbar spine resulting in severe right and moderate left L3-4 foraminal encroachment concerning for right greater than left L3 exiting nerve root impingement.  Correlate with clinical symptoms.  There is also grade 1 anterolisthesis at this level secondary to facet arthrosis similar to prior outside MR study dated 4/26/2019. Pain level range is 8-10/10.  4/24/25- The patient has been trying to walk more, she is using Lidocaine cream. 8/10  5/8/25- The patient reports improvements with treatment after with treatment.  3/10 in the lower back pain.          Back Pain    Past Medical History:   Diagnosis Date   • Alcoholism (HCC)     Quit 2 months ago   • Anemia 6/2010    I was diagnosed in 2010 after gastric bypass   • Anxiety    • Arthritis    • Callus Years    Just became infected 2 days ago   • Diabetes mellitus (HCC) 2000   • Low back pain    •  Lumbosacral disc disease 2019   • Non-toxic multinodular goiter    • Obesity     Since birth   • Osteoarthritis 2019   • Peripheral neuropathy 2018      Past Surgical History:   Procedure Laterality Date   • APPENDECTOMY  11/2010    A few weeks after my bypass   • BARIATRIC SURGERY  2010   • EPIDURAL BLOCK INJECTION  2019   • EYE SURGERY  10/2018    Cataracts removed   • LYMPH NODE BIOPSY     • STOMACH SURGERY      for morbid obesity / last assessed 9/10/12   • TRIGGER POINT INJECTION  2019   • US GUIDED THYROID BIOPSY      biopsy thyroid using percutaneous core needle    • US GUIDED THYROID BIOPSY  07/01/2020     The following portions of the patient's history were reviewed and updated as appropriate: allergies, past family history, past medical history, past social history, past surgical history, and problem list.  Review of Systems   Musculoskeletal:  Positive for arthralgias, back pain and myalgias.     Physical Exam  Constitutional:       Appearance: Normal appearance.   Musculoskeletal:         General: Tenderness present. Normal range of motion.        Arms:       Cervical back: Normal range of motion.        Legs:    Neurological:      Mental Status: She is alert and oriented to person, place, and time.      Gait: Gait is intact.      Deep Tendon Reflexes: Reflexes are normal and symmetric.   Psychiatric:         Attention and Perception: Attention normal.         Mood and Affect: Mood and affect normal.         Speech: Speech normal.         Behavior: Behavior normal. Behavior is cooperative.         Thought Content: Thought content normal.         Cognition and Memory: Cognition normal.         Judgment: Judgment normal.     SOFT TISSUE ASSESSMENT Hypertonicity and tenderness palpated B T10-S1 erector spinae, hip flexor, glute med/min, QL, hamstring JOINT RESTRICTIONS: T10-S1 and R SIJ ORTHO: SI jt point tenderness: +; Yajaira unremarkable for centralization/peripheralization; jonh's, iliac  compression, thigh thrust elicit lbp in R/L SIJ; prone femoral nerve stretch neg for upper lumbar neural tension, elicits R/L SIJ stiffness; sitting root elicits no lbp on R/L; slump test elicits no neural tension R/L    Return in about 2 weeks (around 5/22/2025) for Recheck.

## 2025-05-14 ENCOUNTER — TELEMEDICINE (OUTPATIENT)
Dept: INTERNAL MEDICINE CLINIC | Facility: CLINIC | Age: 60
End: 2025-05-14

## 2025-05-14 DIAGNOSIS — E11.9 TYPE 2 DIABETES MELLITUS WITHOUT COMPLICATION, WITHOUT LONG-TERM CURRENT USE OF INSULIN (HCC): Primary | ICD-10-CM

## 2025-05-14 RX ORDER — TIRZEPATIDE 15 MG/.5ML
15 INJECTION, SOLUTION SUBCUTANEOUS WEEKLY
Qty: 6 ML | Refills: 3 | Status: SHIPPED | OUTPATIENT
Start: 2025-05-14

## 2025-05-14 NOTE — PROGRESS NOTES
Seeing chiropractor - patient very satisfied with her care there!    Taking amitriptyline at bedtime - not sure she's notices a difference. Continue fo rnow.     Compound - works the same as the $10 OTC version, they charged her $75. Elected not to refill.     Walking up to a block daily per chiropractor recommendation and feeling good. Continues to work up distance.     Mounjaro 12.5 has helped with appetite but not losing weight. Eating salads for dinner. Will increase to 15mg

## 2025-05-22 ENCOUNTER — PROCEDURE VISIT (OUTPATIENT)
Age: 60
End: 2025-05-22
Payer: COMMERCIAL

## 2025-05-22 VITALS
BODY MASS INDEX: 35.27 KG/M2 | DIASTOLIC BLOOD PRESSURE: 76 MMHG | WEIGHT: 238.1 LBS | HEART RATE: 97 BPM | HEIGHT: 69 IN | SYSTOLIC BLOOD PRESSURE: 151 MMHG

## 2025-05-22 DIAGNOSIS — G89.29 CHRONIC MIDLINE LOW BACK PAIN WITH BILATERAL SCIATICA: Primary | ICD-10-CM

## 2025-05-22 DIAGNOSIS — M99.03 SEGMENTAL DYSFUNCTION OF LUMBAR REGION: ICD-10-CM

## 2025-05-22 DIAGNOSIS — M54.41 CHRONIC MIDLINE LOW BACK PAIN WITH BILATERAL SCIATICA: Primary | ICD-10-CM

## 2025-05-22 DIAGNOSIS — M99.04 SEGMENTAL DYSFUNCTION OF SACRAL REGION: ICD-10-CM

## 2025-05-22 DIAGNOSIS — M54.42 CHRONIC MIDLINE LOW BACK PAIN WITH BILATERAL SCIATICA: Primary | ICD-10-CM

## 2025-05-22 DIAGNOSIS — M47.816 LUMBAR SPONDYLOSIS: ICD-10-CM

## 2025-05-22 DIAGNOSIS — M99.02 SEGMENTAL DYSFUNCTION OF THORACIC REGION: ICD-10-CM

## 2025-05-22 PROCEDURE — 98941 CHIROPRACT MANJ 3-4 REGIONS: CPT | Performed by: CHIROPRACTOR

## 2025-05-22 NOTE — PROGRESS NOTES
Initial date of service: 4/17/25    Diagnoses and all orders for this visit:    Chronic midline low back pain with bilateral sciatica    Lumbar spondylosis    Segmental dysfunction of lumbar region    Segmental dysfunction of sacral region    Segmental dysfunction of thoracic region       ASSESSMENT:   Pt's symptoms and exam findings consistent with mechanical lbp secondary to repetitive st/sp injury, exacerbated by postural/ergonomic stressors. Pt responded well to flexion biased stretches and manual mobilization of the affected spinal and myofascial tissues with increased ROM; trial of conservative tx recommended consisting of stretching, graded mobilization/manipulation of the affected spinal and myofascial jt dysfunction, postural/ergonomic education and take home stretches/exercises. If symptoms fail to improve with short trial of conservative care, appropriate imaging and referral will be coordinated.  5/22/25- The patient tolerated treatment well with decrease in pain and mm spasm    PROCEDURE CODES: [id number]    TREATMENT:  Fear avoidance behavior discussion; encouraged and reassured pt that natural course of condition is to improve over time with adherence to tx plan and home care strategies. Home care recommendations: avoid bed rest, walk (but avoid trails and uneven surfaces), gradual return to activity to tolerance (avoid anything that peripheralizes symptoms), call if symptoms peripheralize, worsen, or neurologic deficit progresses. Ther-ex: IASTM; discussed post procedure soreness and/or ecchymosis for up to 36 hrs, applied to affected mm hypertonicities; supine hamstring stretch, supine gluteal stretch, side laying QL stretch, single knee to chest stretch, hip flexor pin-and-stretch, alternating prone hip extension, glute bridge, transitional mvmt education, abdominal bracing; greater than 15 min spent performing above mentioned ther-ex to improve ROM/flexibility. Thoracic mobilization: prone P-A mob;  Lumbar mobilization: diversified side laying graded HVLA, flexion-traction; SIJ Mobilization: R/L SIJ HVLA - long axis distraction,     HPI:  Kinsey Duffy is a 60 y.o. female  Chief Complaint   Patient presents with   • Back Pain     Bilateral back pain, stabbing pain improving since treatment, no pain at present but goes up to 8 with activity      The patient presents to the office with chronic lower back pain without trauma,. 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 The patient has had previous treatment including Dr. Sullivan- nerve blocks- helped some.Its been a while. Meds including Tizanidine helped a little but groggy.  PT back in 2022- had to stop due to old health insurance. She can no longer walk and hike like she wants. Currently on disability, can clean for about 30 mins before pain. Tries to break up cleaning bc of the pain. The patient was referred to our office by Steven Heart DO for consult. 9/16/21- MRI Lumbar- 1.  Acute versus subacute L2 superior endplate vertebral compression deformity with moderate loss of vertebral body height.  No significant retropulsion. 2.  Spondylotic changes of the lumbar spine resulting in severe right and moderate left L3-4 foraminal encroachment concerning for right greater than left L3 exiting nerve root impingement.  Correlate with clinical symptoms.  There is also grade 1 anterolisthesis at this level secondary to facet arthrosis similar to prior outside MR study dated 4/26/2019. Pain level range is 8-10/10.  4/24/25- The patient has been trying to walk more, she is using Lidocaine cream. 8/10  5/8/25- The patient reports improvements with treatment after with treatment.  3/10 in the lower back pain.   5/22/25- The patient reports she drive 1.5 hours a day, walked a lot and then got back in the car and mentions improvements with       Back Pain      Past Medical History:   Diagnosis Date   • Alcoholism (HCC)     Quit 2 months ago   •  Anemia 6/2010    I was diagnosed in 2010 after gastric bypass   • Anxiety    • Arthritis    • Callus Years    Just became infected 2 days ago   • Diabetes mellitus (HCC) 2000   • Low back pain    • Lumbosacral disc disease 2019   • Non-toxic multinodular goiter    • Obesity     Since birth   • Osteoarthritis 2019   • Peripheral neuropathy 2018      Past Surgical History:   Procedure Laterality Date   • APPENDECTOMY  11/2010    A few weeks after my bypass   • BARIATRIC SURGERY  2010   • EPIDURAL BLOCK INJECTION  2019   • EYE SURGERY  10/2018    Cataracts removed   • LYMPH NODE BIOPSY     • STOMACH SURGERY      for morbid obesity / last assessed 9/10/12   • TRIGGER POINT INJECTION  2019   • US GUIDED THYROID BIOPSY      biopsy thyroid using percutaneous core needle    • US GUIDED THYROID BIOPSY  07/01/2020     The following portions of the patient's history were reviewed and updated as appropriate: allergies, past family history, past medical history, past social history, past surgical history, and problem list.  Review of Systems   Musculoskeletal:  Positive for arthralgias, back pain and myalgias.     Physical Exam  Constitutional:       Appearance: Normal appearance.     Musculoskeletal:         General: Tenderness present. Normal range of motion.        Arms:       Cervical back: Normal range of motion.        Legs:      Neurological:      Mental Status: She is alert and oriented to person, place, and time.      Gait: Gait is intact.      Deep Tendon Reflexes: Reflexes are normal and symmetric.     Psychiatric:         Attention and Perception: Attention normal.         Mood and Affect: Mood and affect normal.         Speech: Speech normal.         Behavior: Behavior normal. Behavior is cooperative.         Thought Content: Thought content normal.         Cognition and Memory: Cognition normal.         Judgment: Judgment normal.       SOFT TISSUE ASSESSMENT Hypertonicity and tenderness palpated B T10-S1 erector  spinae, hip flexor, glute med/min, QL, hamstring JOINT RESTRICTIONS: T10-S1 and R SIJ ORTHO: SI jt point tenderness: +; Yajaira unremarkable for centralization/peripheralization; jonh's, iliac compression, thigh thrust elicit lbp in R/L SIJ; prone femoral nerve stretch neg for upper lumbar neural tension, elicits R/L SIJ stiffness; sitting root elicits no lbp on R/L; slump test elicits no neural tension R/L    Return in about 1 week (around 5/29/2025) for Recheck.

## 2025-05-28 ENCOUNTER — TELEPHONE (OUTPATIENT)
Dept: OTHER | Facility: OTHER | Age: 60
End: 2025-05-28

## 2025-05-28 NOTE — TELEPHONE ENCOUNTER
"Patient is calling regarding cancelling an appointment.    Date/Time:5/29/25 at 2:30    Patient was rescheduled: YES [] NO [x]    Patient requesting call back to reschedule: YES [x] NO []    Pt stated, \"I would like to have my previous appointment back, 6/3/25 at 11:15\"  "

## 2025-06-03 ENCOUNTER — PROCEDURE VISIT (OUTPATIENT)
Age: 60
End: 2025-06-03
Payer: COMMERCIAL

## 2025-06-03 VITALS
SYSTOLIC BLOOD PRESSURE: 145 MMHG | DIASTOLIC BLOOD PRESSURE: 88 MMHG | WEIGHT: 238 LBS | HEART RATE: 82 BPM | HEIGHT: 69 IN | BODY MASS INDEX: 35.25 KG/M2

## 2025-06-03 DIAGNOSIS — M54.41 CHRONIC MIDLINE LOW BACK PAIN WITH BILATERAL SCIATICA: Primary | ICD-10-CM

## 2025-06-03 DIAGNOSIS — M99.02 SEGMENTAL DYSFUNCTION OF THORACIC REGION: ICD-10-CM

## 2025-06-03 DIAGNOSIS — M99.04 SEGMENTAL DYSFUNCTION OF SACRAL REGION: ICD-10-CM

## 2025-06-03 DIAGNOSIS — M54.42 CHRONIC MIDLINE LOW BACK PAIN WITH BILATERAL SCIATICA: Primary | ICD-10-CM

## 2025-06-03 DIAGNOSIS — G89.29 CHRONIC MIDLINE LOW BACK PAIN WITH BILATERAL SCIATICA: Primary | ICD-10-CM

## 2025-06-03 DIAGNOSIS — M47.816 LUMBAR SPONDYLOSIS: ICD-10-CM

## 2025-06-03 DIAGNOSIS — M99.03 SEGMENTAL DYSFUNCTION OF LUMBAR REGION: ICD-10-CM

## 2025-06-03 PROCEDURE — 98941 CHIROPRACT MANJ 3-4 REGIONS: CPT | Performed by: CHIROPRACTOR

## 2025-06-03 NOTE — PROGRESS NOTES
Initial date of service: 4/17/25    Diagnoses and all orders for this visit:    Chronic midline low back pain with bilateral sciatica    Lumbar spondylosis    Segmental dysfunction of lumbar region    Segmental dysfunction of sacral region    Segmental dysfunction of thoracic region       ASSESSMENT:   Pt's symptoms and exam findings consistent with mechanical lbp secondary to repetitive st/sp injury, exacerbated by postural/ergonomic stressors. Pt responded well to flexion biased stretches and manual mobilization of the affected spinal and myofascial tissues with increased ROM; trial of conservative tx recommended consisting of stretching, graded mobilization/manipulation of the affected spinal and myofascial jt dysfunction, postural/ergonomic education and take home stretches/exercises. If symptoms fail to improve with short trial of conservative care, appropriate imaging and referral will be coordinated.  5/22/25- The patient tolerated treatment well with decrease in pain and mm spasm  6/3/25- The patient is feeling a little better post-treatment with decrease mm spasm.    PROCEDURE CODES: 19185-CH    TREATMENT:  Fear avoidance behavior discussion; encouraged and reassured pt that natural course of condition is to improve over time with adherence to tx plan and home care strategies. Home care recommendations: avoid bed rest, walk (but avoid trails and uneven surfaces), gradual return to activity to tolerance (avoid anything that peripheralizes symptoms), call if symptoms peripheralize, worsen, or neurologic deficit progresses. Ther-ex: IASTM; discussed post procedure soreness and/or ecchymosis for up to 36 hrs, applied to affected mm hypertonicities; supine hamstring stretch, supine gluteal stretch, side laying QL stretch, single knee to chest stretch, hip flexor pin-and-stretch, alternating prone hip extension, glute bridge, transitional mvmt education, abdominal bracing; greater than 15 min spent performing  above mentioned ther-ex to improve ROM/flexibility. Thoracic mobilization: prone P-A mob; Lumbar mobilization: diversified side laying graded HVLA, flexion-traction; SIJ Mobilization: R/L SIJ HVLA - long axis distraction,     HPI:  Kinsey Duffy is a 60 y.o. female  Chief Complaint   Patient presents with   • Back Pain     Low back pain not too bad but was worse over the weekend about a 3       The patient presents to the office with chronic lower back pain without trauma,. 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 The patient has had previous treatment including Dr. Sullivan- nerve blocks- helped some.Its been a while. Meds including Tizanidine helped a little but groggy.  PT back in 2022- had to stop due to old health insurance. She can no longer walk and hike like she wants. Currently on disability, can clean for about 30 mins before pain. Tries to break up cleaning bc of the pain. The patient was referred to our office by Steven Heart DO for consult. 9/16/21- MRI Lumbar- 1.  Acute versus subacute L2 superior endplate vertebral compression deformity with moderate loss of vertebral body height.  No significant retropulsion. 2.  Spondylotic changes of the lumbar spine resulting in severe right and moderate left L3-4 foraminal encroachment concerning for right greater than left L3 exiting nerve root impingement.  Correlate with clinical symptoms.  There is also grade 1 anterolisthesis at this level secondary to facet arthrosis similar to prior outside MR study dated 4/26/2019. Pain level range is 8-10/10.  4/24/25- The patient has been trying to walk more, she is using Lidocaine cream. 8/10  5/8/25- The patient reports improvements with treatment after with treatment.  3/10 in the lower back pain.   5/22/25- The patient reports she drive 1.5 hours a day, walked a lot and then got back in the car and mentions improvements   6/3/25- The patient is feeling sore from working yard sale and  going to concert legs were cramping up.       Back Pain      Past Medical History:   Diagnosis Date   • Alcoholism (HCC)     Quit 2 months ago   • Anemia 6/2010    I was diagnosed in 2010 after gastric bypass   • Anxiety    • Arthritis    • Callus Years    Just became infected 2 days ago   • Diabetes mellitus (HCC) 2000   • Low back pain    • Lumbosacral disc disease 2019   • Non-toxic multinodular goiter    • Obesity     Since birth   • Osteoarthritis 2019   • Peripheral neuropathy 2018      Past Surgical History:   Procedure Laterality Date   • APPENDECTOMY  11/2010    A few weeks after my bypass   • BARIATRIC SURGERY  2010   • EPIDURAL BLOCK INJECTION  2019   • EYE SURGERY  10/2018    Cataracts removed   • LYMPH NODE BIOPSY     • STOMACH SURGERY      for morbid obesity / last assessed 9/10/12   • TRIGGER POINT INJECTION  2019   • US GUIDED THYROID BIOPSY      biopsy thyroid using percutaneous core needle    • US GUIDED THYROID BIOPSY  07/01/2020     The following portions of the patient's history were reviewed and updated as appropriate: allergies, past family history, past medical history, past social history, past surgical history, and problem list.  Review of Systems   Musculoskeletal:  Positive for arthralgias, back pain and myalgias.     Physical Exam  Constitutional:       Appearance: Normal appearance.     Musculoskeletal:         General: Tenderness present. Normal range of motion.        Arms:       Cervical back: Normal range of motion.        Legs:      Neurological:      Mental Status: She is alert and oriented to person, place, and time.      Gait: Gait is intact.      Deep Tendon Reflexes: Reflexes are normal and symmetric.     Psychiatric:         Attention and Perception: Attention normal.         Mood and Affect: Mood and affect normal.         Speech: Speech normal.         Behavior: Behavior normal. Behavior is cooperative.         Thought Content: Thought content normal.         Cognition and  Memory: Cognition normal.         Judgment: Judgment normal.       SOFT TISSUE ASSESSMENT Hypertonicity and tenderness palpated B T10-S1 erector spinae, hip flexor, glute med/min, QL, hamstring JOINT RESTRICTIONS: T10-S1 and R SIJ ORTHO: SI jt point tenderness: +; Yajaira unremarkable for centralization/peripheralization; jonh's, iliac compression, thigh thrust elicit lbp in R/L SIJ; prone femoral nerve stretch neg for upper lumbar neural tension, elicits R/L SIJ stiffness; sitting root elicits no lbp on R/L; slump test elicits no neural tension R/L    Return in about 2 weeks (around 6/17/2025) for Recheck.

## 2025-06-17 ENCOUNTER — PROCEDURE VISIT (OUTPATIENT)
Age: 60
End: 2025-06-17
Payer: COMMERCIAL

## 2025-06-17 VITALS
BODY MASS INDEX: 35.25 KG/M2 | HEIGHT: 69 IN | SYSTOLIC BLOOD PRESSURE: 140 MMHG | HEART RATE: 78 BPM | DIASTOLIC BLOOD PRESSURE: 77 MMHG | WEIGHT: 238 LBS

## 2025-06-17 DIAGNOSIS — G89.29 CHRONIC MIDLINE LOW BACK PAIN WITH BILATERAL SCIATICA: Primary | ICD-10-CM

## 2025-06-17 DIAGNOSIS — M47.816 LUMBAR SPONDYLOSIS: ICD-10-CM

## 2025-06-17 DIAGNOSIS — M54.42 CHRONIC MIDLINE LOW BACK PAIN WITH BILATERAL SCIATICA: Primary | ICD-10-CM

## 2025-06-17 DIAGNOSIS — M54.41 CHRONIC MIDLINE LOW BACK PAIN WITH BILATERAL SCIATICA: Primary | ICD-10-CM

## 2025-06-17 DIAGNOSIS — M99.03 SEGMENTAL DYSFUNCTION OF LUMBAR REGION: ICD-10-CM

## 2025-06-17 DIAGNOSIS — M99.02 SEGMENTAL DYSFUNCTION OF THORACIC REGION: ICD-10-CM

## 2025-06-17 DIAGNOSIS — M99.04 SEGMENTAL DYSFUNCTION OF SACRAL REGION: ICD-10-CM

## 2025-06-17 PROCEDURE — 98941 CHIROPRACT MANJ 3-4 REGIONS: CPT | Performed by: CHIROPRACTOR

## 2025-06-17 NOTE — PROGRESS NOTES
Initial date of service: 4/17/25    Diagnoses and all orders for this visit:    Chronic midline low back pain with bilateral sciatica    Lumbar spondylosis    Segmental dysfunction of lumbar region    Segmental dysfunction of sacral region    Segmental dysfunction of thoracic region       ASSESSMENT:   Pt's symptoms and exam findings consistent with mechanical lbp secondary to repetitive st/sp injury, exacerbated by postural/ergonomic stressors. Pt responded well to flexion biased stretches and manual mobilization of the affected spinal and myofascial tissues with increased ROM; trial of conservative tx recommended consisting of stretching, graded mobilization/manipulation of the affected spinal and myofascial jt dysfunction, postural/ergonomic education and take home stretches/exercises. If symptoms fail to improve with short trial of conservative care, appropriate imaging and referral will be coordinated.  5/22/25- The patient tolerated treatment well with decrease in pain and mm spasm  6/3/25- The patient is feeling a little better post-treatment with decrease mm spasm.  6/17/25- The patient is feeling more pain free Rom and less mm tension post-treatment.     PROCEDURE CODES: 46863-DY    TREATMENT:  Fear avoidance behavior discussion; encouraged and reassured pt that natural course of condition is to improve over time with adherence to tx plan and home care strategies. Home care recommendations: avoid bed rest, walk (but avoid trails and uneven surfaces), gradual return to activity to tolerance (avoid anything that peripheralizes symptoms), call if symptoms peripheralize, worsen, or neurologic deficit progresses. Ther-ex: IASTM; discussed post procedure soreness and/or ecchymosis for up to 36 hrs, applied to affected mm hypertonicities; supine hamstring stretch, supine gluteal stretch, side laying QL stretch, single knee to chest stretch, hip flexor pin-and-stretch, alternating prone hip extension, glute bridge,  transitional mvmt education, abdominal bracing; greater than 15 min spent performing above mentioned ther-ex to improve ROM/flexibility. Thoracic mobilization: prone P-A mob; Lumbar mobilization: diversified side laying graded HVLA, flexion-traction; SIJ Mobilization: R/L SIJ HVLA - long axis distraction,     HPI:  Kinsey Duffy is a 60 y.o. female  Chief Complaint   Patient presents with   • Back Pain     Mid to lower lumbar is sore from carry wash basket. Pain score 4         The patient presents to the office with chronic lower back pain without trauma,. 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 The patient has had previous treatment including Dr. Sullivan- nerve blocks- helped some.Its been a while. Meds including Tizanidine helped a little but groggy.  PT back in 2022- had to stop due to old health insurance. She can no longer walk and hike like she wants. Currently on disability, can clean for about 30 mins before pain. Tries to break up cleaning bc of the pain. The patient was referred to our office by Steven Heart DO for consult. 9/16/21- MRI Lumbar- 1.  Acute versus subacute L2 superior endplate vertebral compression deformity with moderate loss of vertebral body height.  No significant retropulsion. 2.  Spondylotic changes of the lumbar spine resulting in severe right and moderate left L3-4 foraminal encroachment concerning for right greater than left L3 exiting nerve root impingement.  Correlate with clinical symptoms.  There is also grade 1 anterolisthesis at this level secondary to facet arthrosis similar to prior outside MR study dated 4/26/2019. Pain level range is 8-10/10.  4/24/25- The patient has been trying to walk more, she is using Lidocaine cream. 8/10  5/8/25- The patient reports improvements with treatment after with treatment.  3/10 in the lower back pain.   5/22/25- The patient reports she drive 1.5 hours a day, walked a lot and then got back in the car and  mentions improvements   6/3/25- The patient is feeling sore from working yard sale and going to concert legs were cramping up.   6/17/25- The patient flared up with lower back pain  after carrying up laundry basket. 4/10 pain.     Back Pain      Past Medical History:   Diagnosis Date   • Alcoholism (HCC)     Quit 2 months ago   • Anemia 6/2010    I was diagnosed in 2010 after gastric bypass   • Anxiety    • Arthritis    • Callus Years    Just became infected 2 days ago   • Diabetes mellitus (HCC) 2000   • Low back pain    • Lumbosacral disc disease 2019   • Non-toxic multinodular goiter    • Obesity     Since birth   • Osteoarthritis 2019   • Peripheral neuropathy 2018      Past Surgical History:   Procedure Laterality Date   • APPENDECTOMY  11/2010    A few weeks after my bypass   • BARIATRIC SURGERY  2010   • EPIDURAL BLOCK INJECTION  2019   • EYE SURGERY  10/2018    Cataracts removed   • LYMPH NODE BIOPSY     • STOMACH SURGERY      for morbid obesity / last assessed 9/10/12   • TRIGGER POINT INJECTION  2019   • US GUIDED THYROID BIOPSY      biopsy thyroid using percutaneous core needle    • US GUIDED THYROID BIOPSY  07/01/2020     The following portions of the patient's history were reviewed and updated as appropriate: allergies, past family history, past medical history, past social history, past surgical history, and problem list.  Review of Systems   Musculoskeletal:  Positive for arthralgias, back pain and myalgias.     Physical Exam  Constitutional:       Appearance: Normal appearance.     Musculoskeletal:         General: Tenderness present. Normal range of motion.        Arms:       Cervical back: Normal range of motion.        Legs:      Neurological:      Mental Status: She is alert and oriented to person, place, and time.      Gait: Gait is intact.      Deep Tendon Reflexes: Reflexes are normal and symmetric.     Psychiatric:         Attention and Perception: Attention normal.         Mood and Affect:  Mood and affect normal.         Speech: Speech normal.         Behavior: Behavior normal. Behavior is cooperative.         Thought Content: Thought content normal.         Cognition and Memory: Cognition normal.         Judgment: Judgment normal.       SOFT TISSUE ASSESSMENT Hypertonicity and tenderness palpated B T10-S1 erector spinae, hip flexor, glute med/min, QL, hamstring JOINT RESTRICTIONS: T10-S1 and R SIJ ORTHO: SI jt point tenderness: +; Yajaira unremarkable for centralization/peripheralization; jonh's, iliac compression, thigh thrust elicit lbp in R/L SIJ; prone femoral nerve stretch neg for upper lumbar neural tension, elicits R/L SIJ stiffness; sitting root elicits no lbp on R/L; slump test elicits no neural tension R/L    Return in about 1 week (around 6/24/2025) for Recheck.

## 2025-06-30 ENCOUNTER — VBI (OUTPATIENT)
Dept: ADMINISTRATIVE | Facility: OTHER | Age: 60
End: 2025-06-30

## 2025-06-30 NOTE — TELEPHONE ENCOUNTER
06/30/25 2:29 PM     Chart reviewed for CRC: Colonoscopy ; nothing is submitted to the patient's insurance at this time.     Gail Ojeda [facility]

## 2025-07-03 ENCOUNTER — OFFICE VISIT (OUTPATIENT)
Dept: INTERNAL MEDICINE CLINIC | Facility: CLINIC | Age: 60
End: 2025-07-03
Payer: COMMERCIAL

## 2025-07-03 VITALS
HEIGHT: 68 IN | BODY MASS INDEX: 36.31 KG/M2 | HEART RATE: 93 BPM | OXYGEN SATURATION: 100 % | WEIGHT: 239.6 LBS | SYSTOLIC BLOOD PRESSURE: 132 MMHG | RESPIRATION RATE: 16 BRPM | DIASTOLIC BLOOD PRESSURE: 70 MMHG

## 2025-07-03 DIAGNOSIS — N39.0 ACUTE UTI: Primary | ICD-10-CM

## 2025-07-03 LAB
SL AMB  POCT GLUCOSE, UA: NORMAL
SL AMB LEUKOCYTE ESTERASE,UA: NORMAL
SL AMB POCT BILIRUBIN,UA: NORMAL
SL AMB POCT BLOOD,UA: NORMAL
SL AMB POCT COLOR,UA: YELLOW
SL AMB POCT KETONES,UA: NORMAL
SL AMB POCT NITRITE,UA: NORMAL
SL AMB POCT PH,UA: 6
SL AMB POCT SPECIFIC GRAVITY,UA: 1.01
SL AMB POCT URINE PROTEIN: NORMAL
SL AMB POCT UROBILINOGEN: 3.5

## 2025-07-03 PROCEDURE — G2211 COMPLEX E/M VISIT ADD ON: HCPCS | Performed by: INTERNAL MEDICINE

## 2025-07-03 PROCEDURE — 99213 OFFICE O/P EST LOW 20 MIN: CPT | Performed by: INTERNAL MEDICINE

## 2025-07-03 RX ORDER — OMEPRAZOLE 20 MG/1
20 CAPSULE, DELAYED RELEASE ORAL 2 TIMES DAILY
Qty: 60 CAPSULE | Refills: 0 | Status: SHIPPED | OUTPATIENT
Start: 2025-07-03 | End: 2025-07-03

## 2025-07-03 RX ORDER — NITROFURANTOIN 25; 75 MG/1; MG/1
100 CAPSULE ORAL 2 TIMES DAILY
Qty: 10 CAPSULE | Refills: 0 | Status: SHIPPED | OUTPATIENT
Start: 2025-07-03 | End: 2025-07-08

## 2025-07-03 NOTE — PROGRESS NOTES
"Name: Kinsey Duffy      : 1965      MRN: [id number]  Encounter Provider: Trung Vanegas MD  Encounter Date: 7/3/2025   Encounter department: [facility]  :  Assessment & Plan  Acute UTI  - Point-of-care UA shows no evidence of leukocytes or nitrites however patient's symptoms recently started yesterday in addition to this she already took a dose of her 's ciprofloxacin which could affect her UA result.  Will treat based on her symptoms with Macrobid 100 mg twice daily x 5 days.  Orders:  •  nitrofurantoin (MACROBID) 100 mg capsule; Take 1 capsule (100 mg total) by mouth 2 (two) times a day for 5 days           History of Present Illness   HPI  Patient presents as an acute visit. She states yesterday in the afternoon she developed urinary urgency and pressure. She states she has also been experiencing irritation post urination.  She denies any fevers, chills or hematuria.  Of note, yesterday evening she reports she took one of her husbands leftover ciprofloxacin to start treatment.    Review of Systems  All other systems negative except for pertinent findings noted in HPI.       Objective   /70 (BP Location: Left arm, Patient Position: Sitting, Cuff Size: Large)   Pulse 93   Resp 16   Ht 5' 8\" (1.727 m)   Wt 109 kg (239 lb 9.6 oz)   SpO2 100%   BMI 36.43 kg/m²      Physical Exam  General: NAD  HEENT: NCAT, EOMI, normal conjunctiva  Cardiovascular: RRR, normal S1 and S2, no m/r/g  Pulmonary: Normal respiratory effort, no wheezes, rales or rhonchi  GI: Soft, suprapubic tenderness, nondistended, normoactive bowel sounds  Extremities: No lower extremity edema  Skin: Normal skin color, no rashes   Psychiatric: Normal mood and affect      "

## 2025-07-17 ENCOUNTER — PROCEDURE VISIT (OUTPATIENT)
Age: 60
End: 2025-07-17
Payer: COMMERCIAL

## 2025-07-17 VITALS
DIASTOLIC BLOOD PRESSURE: 71 MMHG | HEART RATE: 90 BPM | WEIGHT: 240.3 LBS | HEIGHT: 68 IN | BODY MASS INDEX: 36.42 KG/M2 | SYSTOLIC BLOOD PRESSURE: 131 MMHG

## 2025-07-17 DIAGNOSIS — M54.41 CHRONIC MIDLINE LOW BACK PAIN WITH BILATERAL SCIATICA: Primary | ICD-10-CM

## 2025-07-17 DIAGNOSIS — G89.29 CHRONIC MIDLINE LOW BACK PAIN WITH BILATERAL SCIATICA: Primary | ICD-10-CM

## 2025-07-17 DIAGNOSIS — M54.42 CHRONIC MIDLINE LOW BACK PAIN WITH BILATERAL SCIATICA: Primary | ICD-10-CM

## 2025-07-17 DIAGNOSIS — M99.03 SEGMENTAL DYSFUNCTION OF LUMBAR REGION: ICD-10-CM

## 2025-07-17 DIAGNOSIS — M99.02 SEGMENTAL DYSFUNCTION OF THORACIC REGION: ICD-10-CM

## 2025-07-17 DIAGNOSIS — M99.04 SEGMENTAL DYSFUNCTION OF SACRAL REGION: ICD-10-CM

## 2025-07-17 DIAGNOSIS — M47.816 LUMBAR SPONDYLOSIS: ICD-10-CM

## 2025-07-17 PROCEDURE — 98941 CHIROPRACT MANJ 3-4 REGIONS: CPT | Performed by: CHIROPRACTOR

## 2025-07-17 NOTE — PROGRESS NOTES
Initial date of service: 4/17/25    Diagnoses and all orders for this visit:    Chronic midline low back pain with bilateral sciatica    Lumbar spondylosis    Segmental dysfunction of lumbar region    Segmental dysfunction of sacral region    Segmental dysfunction of thoracic region       ASSESSMENT:   Pt's symptoms and exam findings consistent with mechanical lbp secondary to repetitive st/sp injury, exacerbated by postural/ergonomic stressors. Pt responded well to flexion biased stretches and manual mobilization of the affected spinal and myofascial tissues with increased ROM; trial of conservative tx recommended consisting of stretching, graded mobilization/manipulation of the affected spinal and myofascial jt dysfunction, postural/ergonomic education and take home stretches/exercises. If symptoms fail to improve with short trial of conservative care, appropriate imaging and referral will be coordinated.  5/22/25- The patient tolerated treatment well with decrease in pain and mm spasm  6/3/25- The patient is feeling a little better post-treatment with decrease mm spasm.  6/17/25- The patient is feeling more pain free Rom and less mm tension post-treatment.   7/17/25- The patient tolerated treatment fairly well, decrease pain and mm spasm.     PROCEDURE CODES: 16582-HX    TREATMENT:  Fear avoidance behavior discussion; encouraged and reassured pt that natural course of condition is to improve over time with adherence to tx plan and home care strategies. Home care recommendations: avoid bed rest, walk (but avoid trails and uneven surfaces), gradual return to activity to tolerance (avoid anything that peripheralizes symptoms), call if symptoms peripheralize, worsen, or neurologic deficit progresses. Ther-ex: IASTM; discussed post procedure soreness and/or ecchymosis for up to 36 hrs, applied to affected mm hypertonicities; supine hamstring stretch, supine gluteal stretch, side laying QL stretch, single knee to chest  stretch, hip flexor pin-and-stretch, alternating prone hip extension, glute bridge, transitional mvmt education, abdominal bracing; greater than 15 min spent performing above mentioned ther-ex to improve ROM/flexibility. Thoracic mobilization: prone P-A mob; Lumbar mobilization: diversified side laying graded HVLA, flexion-traction; SIJ Mobilization: R/L SIJ HVLA - long axis distraction,     HPI:  Kinsey Duffy is a 60 y.o. female  Chief Complaint   Patient presents with   • Back Pain     Bilateral back pain and mid back, intermittent burning pain, pain score 5      The patient presents to the office with chronic lower back pain without trauma,. 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 The patient has had previous treatment including Dr. Sullivan- nerve blocks- helped some.Its been a while. Meds including Tizanidine helped a little but groggy.  PT back in 2022- had to stop due to old health insurance. She can no longer walk and hike like she wants. Currently on disability, can clean for about 30 mins before pain. Tries to break up cleaning bc of the pain. The patient was referred to our office by Steven Heart DO for consult. 9/16/21- MRI Lumbar- 1.  Acute versus subacute L2 superior endplate vertebral compression deformity with moderate loss of vertebral body height.  No significant retropulsion. 2.  Spondylotic changes of the lumbar spine resulting in severe right and moderate left L3-4 foraminal encroachment concerning for right greater than left L3 exiting nerve root impingement.  Correlate with clinical symptoms.  There is also grade 1 anterolisthesis at this level secondary to facet arthrosis similar to prior outside MR study dated 4/26/2019. Pain level range is 8-10/10.  4/24/25- The patient has been trying to walk more, she is using Lidocaine cream. 8/10  5/8/25- The patient reports improvements with treatment after with treatment.  3/10 in the lower back pain.   5/22/25- The  patient reports she drive 1.5 hours a day, walked a lot and then got back in the car and mentions improvements   6/3/25- The patient is feeling sore from working yard sale and going to concert legs were cramping up.   6/17/25- The patient flared up with lower back pain  after carrying up laundry basket. 4/10 pain.   7/17/25- The patient is feeling sore today 5/10    Back Pain      Past Medical History:   Diagnosis Date   • Alcoholism (HCC)     Quit 2 months ago   • Anemia 6/2010    I was diagnosed in 2010 after gastric bypass   • Anxiety    • Arthritis    • Callus Years    Just became infected 2 days ago   • Diabetes mellitus (HCC) 2000   • Low back pain    • Lumbosacral disc disease 2019   • Non-toxic multinodular goiter    • Obesity     Since birth   • Osteoarthritis 2019   • Peripheral neuropathy 2018      Past Surgical History:   Procedure Laterality Date   • APPENDECTOMY  11/2010    A few weeks after my bypass   • BARIATRIC SURGERY  2010   • EPIDURAL BLOCK INJECTION  2019   • EYE SURGERY  10/2018    Cataracts removed   • LYMPH NODE BIOPSY     • STOMACH SURGERY      for morbid obesity / last assessed 9/10/12   • TRIGGER POINT INJECTION  2019   • US GUIDED THYROID BIOPSY      biopsy thyroid using percutaneous core needle    • US GUIDED THYROID BIOPSY  07/01/2020     The following portions of the patient's history were reviewed and updated as appropriate: allergies, past family history, past medical history, past social history, past surgical history, and problem list.  Review of Systems   Musculoskeletal:  Positive for arthralgias, back pain and myalgias.     Physical Exam  Constitutional:       Appearance: Normal appearance.     Musculoskeletal:         General: Tenderness present. Normal range of motion.        Arms:       Cervical back: Normal range of motion.        Legs:      Neurological:      Mental Status: She is alert and oriented to person, place, and time.      Gait: Gait is intact.      Deep Tendon  Reflexes: Reflexes are normal and symmetric.     Psychiatric:         Attention and Perception: Attention normal.         Mood and Affect: Mood and affect normal.         Speech: Speech normal.         Behavior: Behavior normal. Behavior is cooperative.         Thought Content: Thought content normal.         Cognition and Memory: Cognition normal.         Judgment: Judgment normal.       SOFT TISSUE ASSESSMENT Hypertonicity and tenderness palpated B T10-S1 erector spinae, hip flexor, glute med/min, QL, hamstring JOINT RESTRICTIONS: T10-S1 and R SIJ ORTHO: SI jt point tenderness: +; Yajaira unremarkable for centralization/peripheralization; jonh's, iliac compression, thigh thrust elicit lbp in R/L SIJ; prone femoral nerve stretch neg for upper lumbar neural tension, elicits R/L SIJ stiffness; sitting root elicits no lbp on R/L; slump test elicits no neural tension R/L    Return in about 1 week (around 7/24/2025) for Recheck.

## 2025-08-07 ENCOUNTER — PROCEDURE VISIT (OUTPATIENT)
Age: 60
End: 2025-08-07
Payer: COMMERCIAL

## 2025-08-07 VITALS — BODY MASS INDEX: 36.42 KG/M2 | WEIGHT: 240.3 LBS | HEIGHT: 68 IN

## 2025-08-07 DIAGNOSIS — M54.41 CHRONIC MIDLINE LOW BACK PAIN WITH BILATERAL SCIATICA: Primary | ICD-10-CM

## 2025-08-07 DIAGNOSIS — M99.03 SEGMENTAL DYSFUNCTION OF LUMBAR REGION: ICD-10-CM

## 2025-08-07 DIAGNOSIS — M47.816 LUMBAR SPONDYLOSIS: ICD-10-CM

## 2025-08-07 DIAGNOSIS — M99.04 SEGMENTAL DYSFUNCTION OF SACRAL REGION: ICD-10-CM

## 2025-08-07 DIAGNOSIS — M54.42 CHRONIC MIDLINE LOW BACK PAIN WITH BILATERAL SCIATICA: Primary | ICD-10-CM

## 2025-08-07 DIAGNOSIS — M99.02 SEGMENTAL DYSFUNCTION OF THORACIC REGION: ICD-10-CM

## 2025-08-07 DIAGNOSIS — G89.29 CHRONIC MIDLINE LOW BACK PAIN WITH BILATERAL SCIATICA: Primary | ICD-10-CM

## 2025-08-07 PROCEDURE — 98941 CHIROPRACT MANJ 3-4 REGIONS: CPT | Performed by: CHIROPRACTOR

## 2025-08-21 ENCOUNTER — PROCEDURE VISIT (OUTPATIENT)
Age: 60
End: 2025-08-21
Payer: COMMERCIAL

## 2025-08-21 VITALS
BODY MASS INDEX: 36.37 KG/M2 | DIASTOLIC BLOOD PRESSURE: 82 MMHG | WEIGHT: 240 LBS | SYSTOLIC BLOOD PRESSURE: 132 MMHG | HEART RATE: 81 BPM | HEIGHT: 68 IN

## 2025-08-21 DIAGNOSIS — G89.29 CHRONIC MIDLINE LOW BACK PAIN WITH BILATERAL SCIATICA: Primary | ICD-10-CM

## 2025-08-21 DIAGNOSIS — M99.03 SEGMENTAL DYSFUNCTION OF LUMBAR REGION: ICD-10-CM

## 2025-08-21 DIAGNOSIS — M99.04 SEGMENTAL DYSFUNCTION OF SACRAL REGION: ICD-10-CM

## 2025-08-21 DIAGNOSIS — M47.816 LUMBAR SPONDYLOSIS: ICD-10-CM

## 2025-08-21 DIAGNOSIS — M54.42 CHRONIC MIDLINE LOW BACK PAIN WITH BILATERAL SCIATICA: Primary | ICD-10-CM

## 2025-08-21 DIAGNOSIS — M99.02 SEGMENTAL DYSFUNCTION OF THORACIC REGION: ICD-10-CM

## 2025-08-21 DIAGNOSIS — M54.41 CHRONIC MIDLINE LOW BACK PAIN WITH BILATERAL SCIATICA: Primary | ICD-10-CM

## 2025-08-21 PROCEDURE — 98941 CHIROPRACT MANJ 3-4 REGIONS: CPT | Performed by: CHIROPRACTOR
